# Patient Record
Sex: MALE | Employment: OTHER | URBAN - METROPOLITAN AREA
[De-identification: names, ages, dates, MRNs, and addresses within clinical notes are randomized per-mention and may not be internally consistent; named-entity substitution may affect disease eponyms.]

---

## 2019-09-23 ENCOUNTER — PATIENT OUTREACH (OUTPATIENT)
Dept: HEALTH INFORMATION MANAGEMENT | Facility: OTHER | Age: 74
End: 2019-09-23

## 2019-09-23 NOTE — PROGRESS NOTES
Outcome: Left Message    Please transfer to Patient Outreach Team at [phone] when patient returns call.    Attempt # 1

## 2019-10-01 NOTE — PROGRESS NOTES
Patient called in and declined at this time to set up his annual wellness. He stated he just saw his provider in August and will schedule to follow up with him when he sees a cardiology.

## 2019-12-17 ENCOUNTER — OFFICE VISIT (OUTPATIENT)
Dept: CARDIOLOGY | Facility: CLINIC | Age: 74
End: 2019-12-17
Payer: MEDICARE

## 2019-12-17 VITALS
HEIGHT: 71 IN | HEART RATE: 66 BPM | OXYGEN SATURATION: 96 % | SYSTOLIC BLOOD PRESSURE: 160 MMHG | DIASTOLIC BLOOD PRESSURE: 80 MMHG | BODY MASS INDEX: 30.1 KG/M2 | WEIGHT: 215 LBS

## 2019-12-17 DIAGNOSIS — I35.0 NONRHEUMATIC AORTIC VALVE STENOSIS: ICD-10-CM

## 2019-12-17 DIAGNOSIS — R53.83 OTHER FATIGUE: ICD-10-CM

## 2019-12-17 DIAGNOSIS — R01.1 UNDIAGNOSED CARDIAC MURMURS: ICD-10-CM

## 2019-12-17 DIAGNOSIS — I10 ESSENTIAL HYPERTENSION: ICD-10-CM

## 2019-12-17 DIAGNOSIS — E78.00 PURE HYPERCHOLESTEROLEMIA: ICD-10-CM

## 2019-12-17 LAB — EKG IMPRESSION: NORMAL

## 2019-12-17 PROCEDURE — 93000 ELECTROCARDIOGRAM COMPLETE: CPT | Performed by: INTERNAL MEDICINE

## 2019-12-17 PROCEDURE — 99406 BEHAV CHNG SMOKING 3-10 MIN: CPT | Performed by: INTERNAL MEDICINE

## 2019-12-17 PROCEDURE — 99204 OFFICE O/P NEW MOD 45 MIN: CPT | Mod: 25 | Performed by: INTERNAL MEDICINE

## 2019-12-17 NOTE — LETTER
[facility] [address],   2nd Floor  [place] [address]  Phone: [phone]  Fax: [phone]              Jacobo Bowling  1945    Encounter Date: 2019    Hayley Barker M.D.          PROGRESS NOTE:  Subjective:   Chief Complaint:   Chief Complaint   Patient presents with   • Fatigue   • Heart Murmur       Jacobo Bowling is a 74 y.o. male who is referred by KADI Castro for a murmur, Hx of rheumatic heart disease and fatigue.    At age 11, had rheumatic fever, joint pain, could not walk, then heart murmur same time, was in the hospital for weeks. Then murmur went away, was in the [employer] for many years. They did extensive testing, did not find anything wrong.    Noted on ecg to have sinus beatriz.    He is not limited by chest pain, pressure or tightness with activity.   No significant dyspnea on exertion, orthopnea or lower extremity swelling.   No significant palpitations, lightheaded, or presyncope/syncope.   Left eye operation, mild dizziness.  No symptoms of leg claudication.   No stroke/TIA like symptoms.  His balance feels off a bit after eye surgery.  Has noted from fatigue, slowing down.    No family history of premature coronary artery disease.  Father had CVA around age 67.  Mother  at 62 of breast CA.  No siblings.   Still smoking 8-10 cig per day, smoking since age 15.  Has mild HLP, .  No history of diabetes.  No history of hypertension but BP elevated today. Prior 140-160.  No history of autoimmune disease such as lupus or rheumatoid arthritis.  No chronic kidney disease.  Rare ETOH.    Put on 12 pounds, planning to lose it.  Likes salt, sun flower seeds with salt.    From [facility], mother Gu[place]ese, Father Polish, fought with Maltese resistance, he was conceived during the , moved to the [facility] in 4, age 8. Lived in [place], until the [employer] after high school. Retired from [place], [place], moved  "to [facility], then [place], was too hot, landed here, lived in [place].  Raised 2 daughters in [place] and [place].  One daughter in [place], one in [place]. Son-in-law Principal at [place] [employer].    Wife is Amy (goes by \"Kenneth,\" does not like Amy), not here today, in good health,  44 years. They met in [facility] [place], she is from [place].    DATA REVIEWED by me:  ECG 19  Sinus, 56, first degree AV delay.    Echo pending    Most recent labs:     8-15-19 H 14.9, p 141, na 140, k 4.5, cr 1.12, lfts normal, ldl 112, hdl 43, tg 145, tc 184, TSH 3.32    Past Medical History:   Diagnosis Date   • History of chickenpox    • History of measles    • History of mumps    • History of rheumatic fever      History reviewed. No pertinent surgical history.  Family History   Problem Relation Age of Onset   • Cancer Mother         Breast,  at 62   • Stroke Father         CVA age 67     Social History     Socioeconomic History   • Marital status:      Spouse name: Not on file   • Number of children: Not on file   • Years of education: Not on file   • Highest education level: Not on file   Occupational History   • Not on file   Social Needs   • Financial resource strain: Not on file   • Food insecurity:     Worry: Not on file     Inability: Not on file   • Transportation needs:     Medical: Not on file     Non-medical: Not on file   Tobacco Use   • Smoking status: Current Every Day Smoker   • Smokeless tobacco: Never Used   Substance and Sexual Activity   • Alcohol use: Yes   • Drug use: No   • Sexual activity: Not on file   Lifestyle   • Physical activity:     Days per week: Not on file     Minutes per session: Not on file   • Stress: Not on file   Relationships   • Social connections:     Talks on phone: Not on file     Gets together: Not on file     Attends Voodoo service: Not on file     Active member of club or organization: Not on file     Attends meetings of clubs or organizations: Not on file     " "Relationship status: Not on file   • Intimate partner violence:     Fear of current or ex partner: Not on file     Emotionally abused: Not on file     Physically abused: Not on file     Forced sexual activity: Not on file   Other Topics Concern   • Not on file   Social History Narrative   • Not on file     Allergies   Allergen Reactions   • Typhoid Vaccines        No current outpatient medications on file.     No current facility-administered medications for this visit.        ROS  All others systems reviewed and negative.     Objective:     /80 (BP Location: Right arm, Patient Position: Sitting)   Pulse 66   Ht 1.803 m (5' 11\")   Wt 97.5 kg (215 lb)   SpO2 96%  Body mass index is 29.99 kg/m².    Physical Exam   General: No acute distress. Well nourished.  HEENT: EOM grossly intact, no scleral icterus, no pharyngeal erythema.   Neck:  No JVD, no bruits, trachea midline  CVS: Slow, regular, 3/6 mid peaking sys murmur R, LLSB, No LE edema.  2+ radial pulses, 2+ PT pulses  Resp: CTAB. No wheezing or crackles/rhonchi. Normal respiratory effort.  Abdomen: Soft, NT, no juliette hepatomegaly.  MSK/Ext: No clubbing or cyanosis.  Skin: Warm and dry, no rashes.  Neurological: CN III-XII grossly intact. No focal deficits. Resting tremor.  Psych: A&O x 3, appropriate affect, good judgement      Assessment:     1. Other fatigue  EKG   2. Undiagnosed cardiac murmurs     3. Essential hypertension     4. Pure hypercholesterolemia     5. Nonrheumatic aortic valve stenosis  EC-ECHOCARDIOGRAM COMPLETE W/O CONT       Medical Decision Making:  Today's Assessment / Status / Plan:     -He has HTN, see below, I suspect he will be headed toward meds, he takes in a lot of salt.  -He has very mild HLP, no meds yet  -Smoking in bad, he has cut bad, would like to stop, reports he does not inhale.  -Tremor, sounds like mostly resting, consider BB but HR a little on the slow side.  -Needs echo, I suspect at least mild AS but not " severe    I spent 3 minutes discussing the need for smoking/tobacco cessation. We discussed measures for quitting including replacements such as nicotine gum/nicotine patch and I have encouraged further management with PCP.    Written instructions given today:    -Echocardiogram- heart pictures to look at the heart structure and pump function.    *Goal blood pressure is ideally under 120/80, at least under 130/80.    -Blood pressure cuff, spend in the $40-65, with good return policy  -It should be automatic, upper arm, measure your arm to get the correct size, probably adult Large  -Put the cuff in place, sit quietly for 5 min, legs uncrossed, with back support, the take your blood pressure, write it down, keep a log  -Check no more than 1 time day, maybe 4-5 times per week, try different times of day.  -Bring your cuff to at least one appointment where it can be calibrated to a manual cuff.    Salt=sodium=sea salt, guidelines say stay under 2,500 mg daily but I ask for under 4,000 mg daily.  Get salt smart, start looking at labels, count it up.  Salt is hidden in everything, salad dressing, cheese, most canned food.    We now know that lack of exercise is as risky as smoking or having diabetes in terms of your heart health.  Try to perform a moderate intensity exercise which includes risk walking at least 150 minutes a week or 30 minutes for 5 days in the week.  If you are able to perform more vigorous exercise, 70 minutes/week is sufficient.    SMOKING CESSATION:  -Talk to your primary care provider about nicotine supplements such as gum or patches.  -Talked your primary care provider about pharmacological assistance such as Wellbutrin or Chantix.    -[place] Tobacco Quitline  The [place] Tobacco Quitline is a free telephone and online coaching service for any [place] resident who is ready to quit tobacco. Information, referrals and coaching are confidential, and sessions are designed on a schedule that is convenient  for you.    www.[employer]  1-800-QUIT-NOW      Return in about 4 weeks (around 1/14/2020).    It is my pleasure to participate in the care of Mr. Bowling.  Please do not hesitate to contact me with questions or concerns.    Hayley Barker MD, [facility]  Cardiologist [facility]    Please note that this dictation was created using voice recognition software. I have made every reasonable attempt to correct obvious errors, but it is possible there are errors of grammar and possibly content that I did not discover before finalizing the note.      Han Cavazos, P.A.-C.  [address]  [place] [address]  VIA In Basket

## 2019-12-17 NOTE — PROGRESS NOTES
"Subjective:   Chief Complaint:   Chief Complaint   Patient presents with   • Fatigue   • Heart Murmur       Jacobo Bowling is a 74 y.o. male who is referred by KADI Castro for a murmur, Hx of rheumatic heart disease and fatigue.    At age 11, had rheumatic fever, joint pain, could not walk, then heart murmur same time, was in the hospital for weeks. Then murmur went away, was in the [employer] for many years. They did extensive testing, did not find anything wrong.    Noted on ecg to have sinus beatriz.    He is not limited by chest pain, pressure or tightness with activity.   No significant dyspnea on exertion, orthopnea or lower extremity swelling.   No significant palpitations, lightheaded, or presyncope/syncope.   Left eye operation, mild dizziness.  No symptoms of leg claudication.   No stroke/TIA like symptoms.  His balance feels off a bit after eye surgery.  Has noted from fatigue, slowing down.    No family history of premature coronary artery disease.  Father had CVA around age 67.  Mother  at 62 of breast CA.  No siblings.   Still smoking 8-10 cig per day, smoking since age 15.  Has mild HLP, .  No history of diabetes.  No history of hypertension but BP elevated today. Prior 140-160.  No history of autoimmune disease such as lupus or rheumatoid arthritis.  No chronic kidney disease.  Rare ETOH.    Put on 12 pounds, planning to lose it.  Likes salt, sun flower seeds with salt.    From [facility], mother Gabonese, Father Polish, fought with Bengali resistance, he was conceived during the , moved to the [facility] in 4, age 8. Lived in [place], until the [place] after high school. Retired from [place], [place], moved to [facility], then [place], was too hot, landed here, lived in [place].  Raised 2 daughters in [place] and [place].  One daughter in [place], one in [place]. Son-in-law Principal at [employer].    Wife is Amy (goes by \"Kenneth,\" does not like Amy), not here today, in good health,  " 44 years. They met in [facility], she is from [place].    DATA REVIEWED by me:  ECG 19  Sinus, 56, first degree AV delay.    Echo pending    Most recent labs:     8-15-19 H 14.9, p 141, na 140, k 4.5, cr 1.12, lfts normal, ldl 112, hdl 43, tg 145, tc 184, TSH 3.32    Past Medical History:   Diagnosis Date   • History of chickenpox    • History of measles    • History of mumps    • History of rheumatic fever      History reviewed. No pertinent surgical history.  Family History   Problem Relation Age of Onset   • Cancer Mother         Breast,  at 62   • Stroke Father         CVA age 67     Social History     Socioeconomic History   • Marital status:      Spouse name: Not on file   • Number of children: Not on file   • Years of education: Not on file   • Highest education level: Not on file   Occupational History   • Not on file   Social Needs   • Financial resource strain: Not on file   • Food insecurity:     Worry: Not on file     Inability: Not on file   • Transportation needs:     Medical: Not on file     Non-medical: Not on file   Tobacco Use   • Smoking status: Current Every Day Smoker   • Smokeless tobacco: Never Used   Substance and Sexual Activity   • Alcohol use: Yes   • Drug use: No   • Sexual activity: Not on file   Lifestyle   • Physical activity:     Days per week: Not on file     Minutes per session: Not on file   • Stress: Not on file   Relationships   • Social connections:     Talks on phone: Not on file     Gets together: Not on file     Attends Yarsani service: Not on file     Active member of club or organization: Not on file     Attends meetings of clubs or organizations: Not on file     Relationship status: Not on file   • Intimate partner violence:     Fear of current or ex partner: Not on file     Emotionally abused: Not on file     Physically abused: Not on file     Forced sexual activity: Not on file   Other Topics Concern   • Not on file   Social History Narrative   • Not on  "file     Allergies   Allergen Reactions   • Typhoid Vaccines        No current outpatient medications on file.     No current facility-administered medications for this visit.        ROS  All others systems reviewed and negative.     Objective:     /80 (BP Location: Right arm, Patient Position: Sitting)   Pulse 66   Ht 1.803 m (5' 11\")   Wt 97.5 kg (215 lb)   SpO2 96%  Body mass index is 29.99 kg/m².    Physical Exam   General: No acute distress. Well nourished.  HEENT: EOM grossly intact, no scleral icterus, no pharyngeal erythema.   Neck:  No JVD, no bruits, trachea midline  CVS: Slow, regular, 3/6 mid peaking sys murmur R, LLSB, No LE edema.  2+ radial pulses, 2+ PT pulses  Resp: CTAB. No wheezing or crackles/rhonchi. Normal respiratory effort.  Abdomen: Soft, NT, no juliette hepatomegaly.  MSK/Ext: No clubbing or cyanosis.  Skin: Warm and dry, no rashes.  Neurological: CN III-XII grossly intact. No focal deficits. Resting tremor.  Psych: A&O x 3, appropriate affect, good judgement      Assessment:     1. Other fatigue  EKG   2. Undiagnosed cardiac murmurs     3. Essential hypertension     4. Pure hypercholesterolemia     5. Nonrheumatic aortic valve stenosis  EC-ECHOCARDIOGRAM COMPLETE W/O CONT       Medical Decision Making:  Today's Assessment / Status / Plan:     -He has HTN, see below, I suspect he will be headed toward meds, he takes in a lot of salt.  -He has very mild HLP, no meds yet  -Smoking in bad, he has cut bad, would like to stop, reports he does not inhale.  -Tremor, sounds like mostly resting, consider BB but HR a little on the slow side.  -Needs echo, I suspect at least mild AS but not severe    I spent 3 minutes discussing the need for smoking/tobacco cessation. We discussed measures for quitting including replacements such as nicotine gum/nicotine patch and I have encouraged further management with PCP.    Written instructions given today:    -Echocardiogram- heart pictures to look at " the heart structure and pump function.    *Goal blood pressure is ideally under 120/80, at least under 130/80.    -Blood pressure cuff, spend in the $40-65, with good return policy  -It should be automatic, upper arm, measure your arm to get the correct size, probably adult Large  -Put the cuff in place, sit quietly for 5 min, legs uncrossed, with back support, the take your blood pressure, write it down, keep a log  -Check no more than 1 time day, maybe 4-5 times per week, try different times of day.  -Bring your cuff to at least one appointment where it can be calibrated to a manual cuff.    Salt=sodium=sea salt, guidelines say stay under 2,500 mg daily but I ask for under 4,000 mg daily.  Get salt smart, start looking at labels, count it up.  Salt is hidden in everything, salad dressing, cheese, most canned food.    We now know that lack of exercise is as risky as smoking or having diabetes in terms of your heart health.  Try to perform a moderate intensity exercise which includes risk walking at least 150 minutes a week or 30 minutes for 5 days in the week.  If you are able to perform more vigorous exercise, 70 minutes/week is sufficient.    SMOKING CESSATION:  -Talk to your primary care provider about nicotine supplements such as gum or patches.  -Talked your primary care provider about pharmacological assistance such as Wellbutrin or Chantix.    -[place] Tobacco Quitline  The [place] Tobacco Quitline is a free telephone and online coaching service for any [place] resident who is ready to quit tobacco. Information, referrals and coaching are confidential, and sessions are designed on a schedule that is convenient for you.    [email or web address]  1-800-QUIT-NOW      Return in about 4 weeks (around 1/14/2020).    It is my pleasure to participate in the care of Mr. Bowling.  Please do not hesitate to contact me with questions or concerns.    Hayley Barker MD, [facility]  Cardiologist [facility]  Heart and Vascular Health    Please note that this dictation was created using voice recognition software. I have made every reasonable attempt to correct obvious errors, but it is possible there are errors of grammar and possibly content that I did not discover before finalizing the note.

## 2019-12-17 NOTE — PATIENT INSTRUCTIONS
-Echocardiogram- heart pictures to look at the heart structure and pump function.    *Goal blood pressure is ideally under 120/80, at least under 130/80.    -Blood pressure cuff, spend in the $40-65, with good return policy  -It should be automatic, upper arm, measure your arm to get the correct size, probably adult Large  -Put the cuff in place, sit quietly for 5 min, legs uncrossed, with back support, the take your blood pressure, write it down, keep a log  -Check no more than 1 time day, maybe 4-5 times per week, try different times of day.  -Bring your cuff to at least one appointment where it can be calibrated to a manual cuff.    Salt=sodium=sea salt, guidelines say stay under 2,500 mg daily but I ask for under 4,000 mg daily.  Get salt smart, start looking at labels, count it up.  Salt is hidden in everything, salad dressing, cheese, most canned food.    We now know that lack of exercise is as risky as smoking or having diabetes in terms of your heart health.  Try to perform a moderate intensity exercise which includes risk walking at least 150 minutes a week or 30 minutes for 5 days in the week.  If you are able to perform more vigorous exercise, 70 minutes/week is sufficient.    SMOKING CESSATION:  -Talk to your primary care provider about nicotine supplements such as gum or patches.  -Talked your primary care provider about pharmacological assistance such as Wellbutrin or Chantix.    -[place] Tobacco Quitline  The [place] Tobacco Quitline is a free telephone and online coaching service for any [place] resident who is ready to quit tobacco. Information, referrals and coaching are confidential, and sessions are designed on a schedule that is convenient for you.    [email or web address].[email or web address]  1-800-QUIT-NOW

## 2019-12-18 ENCOUNTER — ANCILLARY PROCEDURE (OUTPATIENT)
Dept: CARDIOLOGY | Facility: IMAGING CENTER | Age: 74
End: 2019-12-18
Attending: INTERNAL MEDICINE
Payer: MEDICARE

## 2019-12-18 DIAGNOSIS — I35.0 NONRHEUMATIC AORTIC VALVE STENOSIS: ICD-10-CM

## 2019-12-18 LAB
LV EJECT FRACT  99904: 65
LV EJECT FRACT MOD 2C 99903: 51
LV EJECT FRACT MOD 4C 99902: 80.22
LV EJECT FRACT MOD BP 99901: 67.77

## 2019-12-18 PROCEDURE — 93306 TTE W/DOPPLER COMPLETE: CPT | Performed by: INTERNAL MEDICINE

## 2019-12-20 ENCOUNTER — TELEPHONE (OUTPATIENT)
Dept: CARDIOLOGY | Facility: MEDICAL CENTER | Age: 74
End: 2019-12-20

## 2019-12-20 NOTE — TELEPHONE ENCOUNTER
----- Message from Hayley Barker M.D. sent at 12/18/2019  4:47 PM PST -----  K,  This is a very nice shanell that I met in [place].  He had rheumatic fever as a child and it is probably causing mild to moderate aortic stenosis.  Please let him know.  We will repeat an echo in 1 year.  Also, when I met him I told him he had high blood pressure.  His echo shows that his heart muscle is mildly thickened (mild LVH or left ventricular hypertrophy) from years of uncontrolled high blood pressure so we really need to work to get his blood pressure under 130/80.  If he has any questions, just defer them to myself or Jagruti.  Thank you!

## 2020-01-17 ENCOUNTER — OFFICE VISIT (OUTPATIENT)
Dept: CARDIOLOGY | Facility: CLINIC | Age: 75
End: 2020-01-17
Payer: MEDICARE

## 2020-01-17 VITALS
OXYGEN SATURATION: 94 % | SYSTOLIC BLOOD PRESSURE: 138 MMHG | HEART RATE: 58 BPM | HEIGHT: 71 IN | BODY MASS INDEX: 30.1 KG/M2 | DIASTOLIC BLOOD PRESSURE: 70 MMHG | WEIGHT: 215 LBS

## 2020-01-17 DIAGNOSIS — I51.7 LVH (LEFT VENTRICULAR HYPERTROPHY): ICD-10-CM

## 2020-01-17 DIAGNOSIS — R53.83 OTHER FATIGUE: ICD-10-CM

## 2020-01-17 DIAGNOSIS — E78.00 PURE HYPERCHOLESTEROLEMIA: ICD-10-CM

## 2020-01-17 DIAGNOSIS — Z72.0 TOBACCO USE: ICD-10-CM

## 2020-01-17 DIAGNOSIS — I10 ESSENTIAL HYPERTENSION: ICD-10-CM

## 2020-01-17 DIAGNOSIS — I06.0 RHEUMATIC AORTIC STENOSIS: ICD-10-CM

## 2020-01-17 PROCEDURE — 99214 OFFICE O/P EST MOD 30 MIN: CPT | Performed by: INTERNAL MEDICINE

## 2020-01-17 RX ORDER — LISINOPRIL 2.5 MG/1
2.5 TABLET ORAL DAILY
Qty: 90 TAB | Refills: 3 | Status: SHIPPED | OUTPATIENT
Start: 2020-01-17 | End: 2021-01-11 | Stop reason: SDUPTHER

## 2020-01-17 NOTE — LETTER
[facility] [address],   2nd Floor  [place] [address]  Phone: [phone]  Fax: [phone]              Jacobo Bowling  1945    Encounter Date: 2020    Hayley Barker M.D.          PROGRESS NOTE:  Subjective:   Chief Complaint:   Chief Complaint   Patient presents with   • Fatigue       Jacobo Bowling is a 74 y.o. male who returns for mild to mod AS, prior rheumatic heart disease and fatigue, HTN with LVH.    At age 11, had rheumatic fever, joint pain, could not walk, then heart murmur same time, was in the hospital for weeks. Then murmur went away, was in the [employer] for many years. They did extensive testing, did not find anything wrong.  Our echo 12-19 with mild to mod AS.    Has HTN with mild LVH, no meds yet.  Still smoking, was 8-10 cig per day, smoking since age 15, down to 1-2, just a few puffs.  Has mild HLP,     Noted on ecg to have sinus beatriz.    He is not limited by chest pain, pressure or tightness with activity.   No significant dyspnea on exertion, orthopnea or lower extremity swelling.   No significant palpitations, lightheaded, or presyncope/syncope.   Left eye operation, mild dizziness.  No symptoms of leg claudication.   No stroke/TIA like symptoms.  His balance feels off a bit after eye surgery.  Has noted from fatigue, slowing down.    No family history of premature coronary artery disease.  Father had CVA around age 67.  Mother  at 62 of breast CA.  No siblings.   No history of diabetes.  No history of autoimmune disease such as lupus or rheumatoid arthritis.  No chronic kidney disease.  Rare ETOH.    Put on 12 pounds, planning to lose it.  Likes salt, sun flower seeds with salt.    From [place], mother [place], Father Polish, fought with Chinese resistance, he was conceived during the , moved to the [facility] in , age 8. Lived in [place], until the [employer] after high school. Retired  "from [place], [place], moved to [facility], then [place], was too hot, landed here, lived in [place].  Raised 2 daughters in [place] and [place].  One daughter in [place], one in [place]. Son-in-law Principal at [employer].    Wife is Amy (goes by \"Kenneth,\" does not like Amy), not here today, in good health,  44 years. They met in [facility], she is from [place].    DATA REVIEWED by me:  ECG 19  Sinus, 56, first degree AV delay.    Echo 19  No prior study is available for comparison.   Left ventricular ejection fraction is visually estimated to be 65%.  Mild concentric left ventricular hypertrophy.  Mildly dilated left atrium.  Calcified aortic valve leaflets, rheumatic appearing.  Mild to moderate aortic stenosis, visually appears moderate: Vmax is    2.9 m/s, MG 20 mmHg, BRE 1.4 cm2.  Trace tricuspid regurgitation.  Estimated right ventricular systolic pressure  is 30 mmHg.     Most recent labs:     8-15-19 H 14.9, p 141, na 140, k 4.5, cr 1.12, lfts normal, ldl 112, hdl 43, tg 145, tc 184, TSH 3.32    Past Medical History:   Diagnosis Date   • History of chickenpox    • History of measles    • History of mumps    • History of rheumatic fever      History reviewed. No pertinent surgical history.  Family History   Problem Relation Age of Onset   • Cancer Mother         Breast,  at 62   • Stroke Father         CVA age 67     Social History     Socioeconomic History   • Marital status:      Spouse name: Not on file   • Number of children: Not on file   • Years of education: Not on file   • Highest education level: Not on file   Occupational History   • Not on file   Social Needs   • Financial resource strain: Not on file   • Food insecurity:     Worry: Not on file     Inability: Not on file   • Transportation needs:     Medical: Not on file     Non-medical: Not on file   Tobacco Use   • Smoking status: Current Every Day Smoker   • Smokeless tobacco: Never Used   Substance and Sexual Activity  " "  • Alcohol use: Yes   • Drug use: No   • Sexual activity: Not on file   Lifestyle   • Physical activity:     Days per week: Not on file     Minutes per session: Not on file   • Stress: Not on file   Relationships   • Social connections:     Talks on phone: Not on file     Gets together: Not on file     Attends Judaism service: Not on file     Active member of club or organization: Not on file     Attends meetings of clubs or organizations: Not on file     Relationship status: Not on file   • Intimate partner violence:     Fear of current or ex partner: Not on file     Emotionally abused: Not on file     Physically abused: Not on file     Forced sexual activity: Not on file   Other Topics Concern   • Not on file   Social History Narrative   • Not on file     Allergies   Allergen Reactions   • Typhoid Vaccines        Current Outpatient Medications   Medication Sig Dispense Refill   • aspirin EC (ECOTRIN) 81 MG Tablet Delayed Response Take 1 Tab by mouth every day. 30 Tab    • lisinopril (PRINIVIL) 2.5 MG Tab Take 1 Tab by mouth every day. 90 Tab 3     No current facility-administered medications for this visit.        ROS    All others systems reviewed and negative.     Objective:     /70 (BP Location: Right arm, Patient Position: Sitting)   Pulse (!) 58   Ht 1.803 m (5' 11\")   Wt 97.5 kg (215 lb)   SpO2 94%  Body mass index is 29.99 kg/m².    Physical Exam   General: No acute distress. Well nourished.  HEENT: EOM grossly intact, no scleral icterus, no pharyngeal erythema.   Neck:  No JVD, no bruits, trachea midline  CVS: Slow, regular, 3/6 mid peaking sys murmur R, LLSB, No LE edema.  2+ radial pulses, 2+ PT pulses  Resp: CTAB. No wheezing or crackles/rhonchi. Normal respiratory effort.  Abdomen: Soft, NT, no juliette hepatomegaly.  MSK/Ext: No clubbing or cyanosis.  Skin: Warm and dry, no rashes.  Neurological: CN III-XII grossly intact. No focal deficits. Resting tremor.  Psych: A&O x 3, appropriate " affect, good judgement    Physical exam performed today and unchanged, except what is noted, compared to 12-17-19.      Assessment:     1. Rheumatic aortic stenosis     2. Other fatigue     3. Essential hypertension  Basic Metabolic Panel   4. Pure hypercholesterolemia     5. Tobacco use     6. LVH (left ventricular hypertrophy)         Medical Decision Making:  Today's Assessment / Status / Plan:     -He has HTN, see below, I suspect he will be headed toward meds, he takes in a lot of salt. Goal BP closer to 120/80 with LVH.  -He has very mild HLP, no meds yet  -Smoking- he had cut back, wondering if he can stop psychologically completely.  -Tremor, sounds like mostly resting, consider BB but HR a little on the slow side.  -Repeat echo 1-2 years.  -ASA 81 mg to protect from thrombus on valve  -I recommend low dose ACE-I, no BB again because of slow HR  -RTC 6 months  -BMP 1 month, phone call results  -He wants to lose weight    I spent 4 minutes discussing the need for smoking/tobacco cessation. We discussed measures for quitting including lifestyle changes.    Written instructions given today:    -Low dose lisinopril 2.5 mg, watch for dry, hacking cough that can develop at any time, watch for lightheadness, call the office if you are having side effects  -Check non fasting blood work in 1 month to look at kidney function  -ASA 81 mg daily  -We will repeat an echo in 1-2 years  -[phone]    Goal blood pressure is ideally under 120/80, at least under 130/80.  Salt=sodium=sea salt, guidelines say stay under 2,500 mg daily but I ask for under 4,000 mg daily.  Get salt smart, start looking at labels, count it up.  Salt is hidden in everything, salad dressing, cheese, most canned food.    We now know that lack of exercise is as risky as smoking or having diabetes in terms of your heart health.  Try to perform a moderate intensity exercise which includes risk walking at least 150 minutes a week or 30 minutes for 5  days in the week.  If you are able to perform more vigorous exercise, 70 minutes/week is sufficient.    Please look into the following diets and incorporate them into your diet    [facility]    Dr Nahomi Harley's Cardiologist    DASH DIET - American Heart Association    Return in about 6 months (around 7/17/2020).    It is my pleasure to participate in the care of Mr. Bowling.  Please do not hesitate to contact me with questions or concerns.    Hayley Barker MD, [facility]  Cardiologist [facility]    Please note that this dictation was created using voice recognition software. I have made every reasonable attempt to correct obvious errors, but it is possible there are errors of grammar and possibly content that I did not discover before finalizing the note.      Han Cavazos, P.A.-C.  [address]  [place] [address]  VIA In Basket

## 2020-01-17 NOTE — PATIENT INSTRUCTIONS
-Low dose lisinopril 2.5 mg, watch for dry, hacking cough that can develop at any time, watch for lightheadness, call the office if you are having side effects  -Check non fasting blood work in 1 month to look at kidney function  -ASA 81 mg daily  -We will repeat an echo in 1-2 years  -[phone]    Goal blood pressure is ideally under 120/80, at least under 130/80.  Salt=sodium=sea salt, guidelines say stay under 2,500 mg daily but I ask for under 4,000 mg daily.  Get salt smart, start looking at labels, count it up.  Salt is hidden in everything, salad dressing, cheese, most canned food.    We now know that lack of exercise is as risky as smoking or having diabetes in terms of your heart health.  Try to perform a moderate intensity exercise which includes risk walking at least 150 minutes a week or 30 minutes for 5 days in the week.  If you are able to perform more vigorous exercise, 70 minutes/week is sufficient.    Please look into the following diets and incorporate them into your diet    [facility]    Dr Nahomi Harley's Cardiologist    DASH DIET - American Heart Association

## 2020-01-17 NOTE — PROGRESS NOTES
"Subjective:   Chief Complaint:   Chief Complaint   Patient presents with   • Fatigue       Jacobo Bowling is a 74 y.o. male who returns for mild to mod AS, prior rheumatic heart disease and fatigue, HTN with LVH.    At age 11, had rheumatic fever, joint pain, could not walk, then heart murmur same time, was in the hospital for weeks. Then murmur went away, was in the [employer] for many years. They did extensive testing, did not find anything wrong.  Our echo 12-19 with mild to mod AS.    Has HTN with mild LVH, no meds yet.  Still smoking, was 8-10 cig per day, smoking since age 15, down to 1-2, just a few puffs.  Has mild HLP,     Noted on ecg to have sinus beatriz.    He is not limited by chest pain, pressure or tightness with activity.   No significant dyspnea on exertion, orthopnea or lower extremity swelling.   No significant palpitations, lightheaded, or presyncope/syncope.   Left eye operation, mild dizziness.  No symptoms of leg claudication.   No stroke/TIA like symptoms.  His balance feels off a bit after eye surgery.  Has noted from fatigue, slowing down.    No family history of premature coronary artery disease.  Father had CVA around age 67.  Mother  at 62 of breast CA.  No siblings.   No history of diabetes.  No history of autoimmune disease such as lupus or rheumatoid arthritis.  No chronic kidney disease.  Rare ETOH.    Put on 12 pounds, planning to lose it.  Likes salt, sun flower seeds with salt.    From [place], mother [place], Father [place], fought with [place] resistance, he was conceived during the , moved to the \[facility]"" in , age 8. Lived in [place], until the [employer] after high school. Retired from [place], [place], moved to [facility], then [place], was too hot, landed here, lived in [place].  Raised 2 daughters in [place] and [place].  One daughter in [place], one in [place]. Son-in-law Principal at [place] [employer].    Wife is Amy (goes by \"Kenneth,\" does not like Amy), not here today, " in good health,  44 years. They met in [facility], she is from [place].    DATA REVIEWED by me:  ECG 19  Sinus, 56, first degree AV delay.    Echo 19  No prior study is available for comparison.   Left ventricular ejection fraction is visually estimated to be 65%.  Mild concentric left ventricular hypertrophy.  Mildly dilated left atrium.  Calcified aortic valve leaflets, rheumatic appearing.  Mild to moderate aortic stenosis, visually appears moderate: Vmax is    2.9 m/s, MG 20 mmHg, BRE 1.4 cm2.  Trace tricuspid regurgitation.  Estimated right ventricular systolic pressure  is 30 mmHg.     Most recent labs:     8-15-19 H 14.9, p 141, na 140, k 4.5, cr 1.12, lfts normal, ldl 112, hdl 43, tg 145, tc 184, TSH 3.32    Past Medical History:   Diagnosis Date   • History of chickenpox    • History of measles    • History of mumps    • History of rheumatic fever      History reviewed. No pertinent surgical history.  Family History   Problem Relation Age of Onset   • Cancer Mother         Breast,  at 62   • Stroke Father         CVA age 67     Social History     Socioeconomic History   • Marital status:      Spouse name: Not on file   • Number of children: Not on file   • Years of education: Not on file   • Highest education level: Not on file   Occupational History   • Not on file   Social Needs   • Financial resource strain: Not on file   • Food insecurity:     Worry: Not on file     Inability: Not on file   • Transportation needs:     Medical: Not on file     Non-medical: Not on file   Tobacco Use   • Smoking status: Current Every Day Smoker   • Smokeless tobacco: Never Used   Substance and Sexual Activity   • Alcohol use: Yes   • Drug use: No   • Sexual activity: Not on file   Lifestyle   • Physical activity:     Days per week: Not on file     Minutes per session: Not on file   • Stress: Not on file   Relationships   • Social connections:     Talks on phone: Not on file     Gets together: Not  "on file     Attends Buddhism service: Not on file     Active member of club or organization: Not on file     Attends meetings of clubs or organizations: Not on file     Relationship status: Not on file   • Intimate partner violence:     Fear of current or ex partner: Not on file     Emotionally abused: Not on file     Physically abused: Not on file     Forced sexual activity: Not on file   Other Topics Concern   • Not on file   Social History Narrative   • Not on file     Allergies   Allergen Reactions   • Typhoid Vaccines        Current Outpatient Medications   Medication Sig Dispense Refill   • aspirin EC (ECOTRIN) 81 MG Tablet Delayed Response Take 1 Tab by mouth every day. 30 Tab    • lisinopril (PRINIVIL) 2.5 MG Tab Take 1 Tab by mouth every day. 90 Tab 3     No current facility-administered medications for this visit.        ROS    All others systems reviewed and negative.     Objective:     /70 (BP Location: Right arm, Patient Position: Sitting)   Pulse (!) 58   Ht 1.803 m (5' 11\")   Wt 97.5 kg (215 lb)   SpO2 94%  Body mass index is 29.99 kg/m².    Physical Exam   General: No acute distress. Well nourished.  HEENT: EOM grossly intact, no scleral icterus, no pharyngeal erythema.   Neck:  No JVD, no bruits, trachea midline  CVS: Slow, regular, 3/6 mid peaking sys murmur R, LLSB, No LE edema.  2+ radial pulses, 2+ PT pulses  Resp: CTAB. No wheezing or crackles/rhonchi. Normal respiratory effort.  Abdomen: Soft, NT, no juliette hepatomegaly.  MSK/Ext: No clubbing or cyanosis.  Skin: Warm and dry, no rashes.  Neurological: CN III-XII grossly intact. No focal deficits. Resting tremor.  Psych: A&O x 3, appropriate affect, good judgement    Physical exam performed today and unchanged, except what is noted, compared to 12-17-19.      Assessment:     1. Rheumatic aortic stenosis     2. Other fatigue     3. Essential hypertension  Basic Metabolic Panel   4. Pure hypercholesterolemia     5. Tobacco use     6. " LVH (left ventricular hypertrophy)         Medical Decision Making:  Today's Assessment / Status / Plan:     -He has HTN, see below, I suspect he will be headed toward meds, he takes in a lot of salt. Goal BP closer to 120/80 with LVH.  -He has very mild HLP, no meds yet  -Smoking- he had cut back, wondering if he can stop psychologically completely.  -Tremor, sounds like mostly resting, consider BB but HR a little on the slow side.  -Repeat echo 1-2 years.  -ASA 81 mg to protect from thrombus on valve  -I recommend low dose ACE-I, no BB again because of slow HR  -RTC 6 months  -BMP 1 month, phone call results  -He wants to lose weight    I spent 4 minutes discussing the need for smoking/tobacco cessation. We discussed measures for quitting including lifestyle changes.    Written instructions given today:    -Low dose lisinopril 2.5 mg, watch for dry, hacking cough that can develop at any time, watch for lightheadness, call the office if you are having side effects  -Check non fasting blood work in 1 month to look at kidney function  -ASA 81 mg daily  -We will repeat an echo in 1-2 years  -[phone]    Goal blood pressure is ideally under 120/80, at least under 130/80.  Salt=sodium=sea salt, guidelines say stay under 2,500 mg daily but I ask for under 4,000 mg daily.  Get salt smart, start looking at labels, count it up.  Salt is hidden in everything, salad dressing, cheese, most canned food.    We now know that lack of exercise is as risky as smoking or having diabetes in terms of your heart health.  Try to perform a moderate intensity exercise which includes risk walking at least 150 minutes a week or 30 minutes for 5 days in the week.  If you are able to perform more vigorous exercise, 70 minutes/week is sufficient.    Please look into the following diets and incorporate them into your diet    [facility]    Dr Nahomi Harley's Cardiologist    DASH DIET - American Heart  Association    Return in about 6 months (around 7/17/2020).    It is my pleasure to participate in the care of Mr. Bowling.  Please do not hesitate to contact me with questions or concerns.    Hayley Barker MD, [facility]  Cardiologist [facility]    Please note that this dictation was created using voice recognition software. I have made every reasonable attempt to correct obvious errors, but it is possible there are errors of grammar and possibly content that I did not discover before finalizing the note.

## 2020-01-25 LAB
BUN SERPL-MCNC: 15 MG/DL (ref 8–27)
BUN/CREAT SERPL: 13 (ref 10–24)
CALCIUM SERPL-MCNC: 9.4 MG/DL (ref 8.6–10.2)
CHLORIDE SERPL-SCNC: 104 MMOL/L (ref 96–106)
CO2 SERPL-SCNC: 23 MMOL/L (ref 20–29)
CREAT SERPL-MCNC: 1.13 MG/DL (ref 0.76–1.27)
GLUCOSE SERPL-MCNC: 98 MG/DL (ref 65–99)
POTASSIUM SERPL-SCNC: 5 MMOL/L (ref 3.5–5.2)
SODIUM SERPL-SCNC: 140 MMOL/L (ref 134–144)

## 2020-06-15 ENCOUNTER — TELEPHONE (OUTPATIENT)
Dept: CARDIOLOGY | Facility: MEDICAL CENTER | Age: 75
End: 2020-06-15

## 2020-06-15 NOTE — TELEPHONE ENCOUNTER
YAO/sidney    Pt calling for clearance to have shoulder replacement to be done by Dr Arsenio Hernandez in [place].    Pt wants to know if this past January appt will be within the range of time when LS would be able to clear him.    Please call Jacobo

## 2020-06-15 NOTE — TELEPHONE ENCOUNTER
Hayley Barker M.D. 35 minutes ago (2:58 PM)          Letter done, dawn fax to ortho.  tx         Documentation      ---------------------------------------------------------------------------------------------------------------------------------------------    Clearance letter drawn up by RODRIGUEZ faxed to [phone], Dr. Hernandez's office,[facility], in [place]. Called pt to inform him.

## 2020-06-18 NOTE — TELEPHONE ENCOUNTER
Returned Bria's call. Bria says she has had her questions answered through talking w/ the pt. Bria states pt's surgery had previously been cancelled d/t a second degree HB on EKG prior to surgery. Pt asked for clearance from us after this. Bria understands that the pt has not been seen in the office since this occurrence and that pt does not have appt w/ LS until 7/16. Bria says their anesthesiologist will get another EKG prior to pt's new scheduled date for surgery and depending upon that they will either decide to proceed or have pt wait until he's seen in our office.

## 2020-06-18 NOTE — TELEPHONE ENCOUNTER
Janee Saba RN from [facility] calling to report she received the letter but no notes or ekg with it.  Also, Bria states she is unable to find any notes or ekg in epic.    Please call Bria

## 2020-06-19 NOTE — TELEPHONE ENCOUNTER
Called [facility] again, they will fax over first EKG and repeat EKG from this morning. Fax number provided. They report it's possibly a first degree HB, instead of second degree. They plan to proceed w/ pt's surgery on 6/29, but welcome LS's recommendations.

## 2020-06-19 NOTE — TELEPHONE ENCOUNTER
Pls get copy of ecg with second degree HB OR updated ECG and if it is normal she can have her surgery. Let me know which.  Tx,  LS

## 2020-06-20 NOTE — TELEPHONE ENCOUNTER
Janee,  The ECG shows second-degree heart block type I, Wenckebach which is typically benign.  We will await the next ECG.  Should not be a reason to withhold surgery.  Prior letter should still stand.

## 2020-07-13 NOTE — PROGRESS NOTES
Subjective:   Chief Complaint:   Chief Complaint   Patient presents with   • HTN (Controlled)       Jacobo Bowling is a 74 y.o. male who returns for mild to mod AS, prior rheumatic heart disease and fatigue, HTN with LVH.    At age 11, had rheumatic fever, joint pain, could not walk, then heart murmur same time, was in the hospital for weeks. Then murmur went away, was in the [employer] for many years. They did extensive testing, did not find anything wrong.  Our echo 12-19 with mild to mod AS.    Has HTN with mild LVH, no meds yet.  Still smoking, was 8-10 cig per day, smoking since age 15, down to 1-2, just a few puffs.  Has mild HLP,     Noted on ecg to have sinus beatriz.  I was called about an ecg with second degree heart beat post surgery, I actually suspect high grade heart block.  Today, probably in afib though cannot exclude SSS with high grade heart block    He is not limited by chest pain, pressure or tightness with activity.   No significant dyspnea on exertion, orthopnea or lower extremity swelling.    Left eye operation, some balance problems.  No symptoms of leg claudication.   No stroke/TIA like symptoms.  His balance feels off a bit after eye surgery.  Has noted from fatigue, slowing down.    He is having some fatigue.  No presyncope/syncope.     No family history of premature coronary artery disease.  Father had CVA around age 67.  Mother  at 62 of breast CA.  No siblings.   No history of diabetes.  No history of autoimmune disease such as lupus or rheumatoid arthritis.  No chronic kidney disease.  Rare ETOH.    Put on 12 pounds, planning to lose it.  Likes salt, sun flower seeds with salt.    From [facility], mother [place]ese, Father Polish, fought with [place] resistance, he was conceived during the , moved to the [facility] in , age 8. Lived in [place], until the [place] after high school. Retired from [place], [place], moved to [facility], then [place], was too hot, landed here, lived in  "[place].  Raised 2 daughters in [place] and [place].  One daughter in [place], one in [place]. Son-in-law Principal at [employer].    Wife is Amy (goes by \"Kenneth,\" does not like Amy), not here today, in good health,  44 years. They met in [facility], she is from [place].    DATA REVIEWED by me:  ECG [id number]  Probably afib, 40 though cannot exclude SSS with high grade heart block      ECG 6-  Sinus bradycardia, wenckebach, 41, heart block    ECG 12-17-19  Sinus, 56, first degree AV delay.    Echo 12-18-19  No prior study is available for comparison.   Left ventricular ejection fraction is visually estimated to be 65%.  Mild concentric left ventricular hypertrophy.  Mildly dilated left atrium.  Calcified aortic valve leaflets, rheumatic appearing.  Mild to moderate aortic stenosis, visually appears moderate: Vmax is    2.9 m/s, MG 20 mmHg, BRE 1.4 cm2.  Trace tricuspid regurgitation.  Estimated right ventricular systolic pressure  is 30 mmHg.     Most recent labs:     Lab Results   Component Value Date/Time    HEMOGLOBIN 14.9 08/15/2019 04:18 AM    HEMATOCRIT 43.3 08/15/2019 04:18 AM     (H) 08/15/2019 04:18 AM    TSH 3.320 08/15/2019 04:18 AM      Lab Results   Component Value Date/Time    SODIUM 138 06/09/2020 09:20 AM    POTASSIUM 4.8 06/09/2020 09:20 AM    CHLORIDE 105 06/09/2020 09:20 AM    CO2 27 06/09/2020 09:20 AM    GLUCOSE 91 06/09/2020 09:20 AM    BUN 23 (H) 06/09/2020 09:20 AM    CREATININE 1.2 06/09/2020 09:20 AM      Lab Results   Component Value Date/Time    ASTSGOT 20 08/15/2019 04:18 AM    ALTSGPT 18 08/15/2019 04:18 AM    ALBUMIN 4.5 08/15/2019 04:18 AM      Lab Results   Component Value Date/Time    CHOLSTRLTOT 184 08/15/2019 04:18 AM     (H) 08/15/2019 04:18 AM    HDL 43 08/15/2019 04:18 AM    TRIGLYCERIDE 145 08/15/2019 04:18 AM       8-15-19 H 14.9, p 141, na 140, k 4.5, cr 1.12, lfts normal, ldl 112, hdl 43, tg 145, tc 184, TSH 3.32    Past Medical History:   Diagnosis " Date   • History of chickenpox    • History of measles    • History of mumps    • History of rheumatic fever    • Hypertension      Past Surgical History:   Procedure Laterality Date   • PB RECONSTR TOTAL SHOULDER IMPLANT Right 2020    Procedure: RT ARTHROPLASTY, SHOULDER, TOTAL;  Surgeon: Arsenio Hernandez M.D.;  Location: SURGERY [facility];  Service: Orthopedics   • OTHER      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   • OTHER ORTHOPEDIC SURGERY      bilat RCR     Family History   Problem Relation Age of Onset   • Cancer Mother         Breast,  at 62   • Stroke Father         CVA age 67     Social History     Socioeconomic History   • Marital status:      Spouse name: Not on file   • Number of children: Not on file   • Years of education: Not on file   • Highest education level: Not on file   Occupational History   • Not on file   Social Needs   • Financial resource strain: Not on file   • Food insecurity     Worry: Not on file     Inability: Not on file   • Transportation needs     Medical: Not on file     Non-medical: Not on file   Tobacco Use   • Smoking status: Current Every Day Smoker     Packs/day: 0.30     Types: Cigarettes   • Smokeless tobacco: Never Used   Substance and Sexual Activity   • Alcohol use: Yes     Alcohol/week: 1.8 oz     Types: 1 Cans of beer, 2 Shots of liquor per week     Frequency: 2-3 times a week     Drinks per session: 1 or 2   • Drug use: No   • Sexual activity: Not on file   Lifestyle   • Physical activity     Days per week: Not on file     Minutes per session: Not on file   • Stress: Not on file   Relationships   • Social connections     Talks on phone: Not on file     Gets together: Not on file     Attends Presybeterian service: Not on file     Active member of club or organization: Not on file     Attends meetings of clubs or organizations: Not on file     Relationship status: Not on file   • Intimate partner violence     Fear of current or ex partner: Not on file      "Emotionally abused: Not on file     Physically abused: Not on file     Forced sexual activity: Not on file   Other Topics Concern   • Not on file   Social History Narrative   • Not on file     Allergies   Allergen Reactions   • Typhoid Vaccines        Current Outpatient Medications   Medication Sig Dispense Refill   • oxyCODONE-acetaminophen (PERCOCET) 5-325 MG Tab Take 1 Tab by mouth every four hours as needed.     • triamcinolone acetonide (KENALOG) 0.1 % Cream      • ciclopirox (LOPROX) 0.77 % cream      • multivitamin (THERAGRAN) Tab Take 1 Tab by mouth every day.     • cyanocobalamin (VITAMIN B-12) 500 MCG Tab Take 500 mcg by mouth every day.     • coenzyme Q-10 30 MG capsule Take 60 mg by mouth every day.     • Krill Oil 350 MG Cap Take  by mouth.     • lisinopril (PRINIVIL) 2.5 MG Tab Take 1 Tab by mouth every day. 90 Tab 3     No current facility-administered medications for this visit.        ROS  All others systems reviewed and negative.     Objective:     /60 (BP Location: Left arm, Patient Position: Sitting)   Pulse (!) 48   Ht 1.778 m (5' 10\")   Wt 91.6 kg (202 lb)   SpO2 95%  Body mass index is 28.98 kg/m².    Physical Exam   General: No acute distress. Well nourished.  HEENT: EOM grossly intact, no scleral icterus, no pharyngeal erythema.   Neck:  No JVD, no bruits, trachea midline  CVS: Slow, regular, 3/6 mid peaking sys murmur R, LLSB, No LE edema.  2+ radial pulses, 2+ PT pulses  Resp: CTAB. No wheezing or crackles/rhonchi. Normal respiratory effort.  Abdomen: Soft, NT, no juliette hepatomegaly.  MSK/Ext: No clubbing or cyanosis.  Skin: Warm and dry, no rashes. Bruise over right chest  Neurological: CN III-XII grossly intact. No focal deficits. Resting tremor.  Psych: A&O x 3, appropriate affect, good judgement    Physical exam performed today and unchanged, except what is noted, compared to 1-      Assessment:     1. Rheumatic aortic stenosis     2. Other fatigue     3. Pure " hypercholesterolemia     4. Essential hypertension     5. Tobacco use     6. LVH (left ventricular hypertrophy)     7. Nonrheumatic aortic valve stenosis     8. Second degree heart block  EKG    Cardiac Event Monitor    CL-PERMANENT PACEMAKER INSERTION   9. Persistent atrial fibrillation (HCC)         Medical Decision Making:  Today's Assessment / Status / Plan:     -Needs a PM  -Probably in afib also, needing apixaban after PM  -He has HTN, see below, I suspect he will be headed toward meds, he takes in a lot of salt. Goal BP closer to 120/80 with LVH. Will address in the future  -He has very mild HLP, no meds yet  -Smoking- he had cut back, wondering if he can stop psychologically completely.  -Tremor, sounds like mostly resting, consider propranolol after PM  -Repeat echo 1-2 years for AS  -ASA 81 mg, primary prevention, I suspect afib and will need anticoagulation  -Needs to quit smoking  -RTC 1 months    Written instructions given today:    Checking Blood Pressure:  -Blood pressure cuff, spend in the $40-65, with good return policy  -It should be automatic, upper arm, measure your arm to get the correct size, probably adult Large  -Put the cuff in place, rest arm on table near height of your heart, sit quietly for 5 min, legs uncrossed, with back support, then take your blood pressure, write it down, keep a log  -Check no more than 1 time day, maybe 4-5 times per week, try different times of day.  -Can bring your cuff to at least one appointment where it can be calibrated to a manual cuff if you are concerned.  -Goal blood pressure is at least under 130/80, ideally under 120/80.  If you think your BP is overall too high, let us know in the office, we can adjust medications, can use [employer] or call the [facility] office: [phone].    Salt=sodium=sea salt, guidelines say stay under 2,500 mg daily but I ask for under 4,000 mg daily.  Get salt smart, start looking at labels, count it up.  Salt is hidden in  everything, salad dressing, sauces, cheese, most canned food, any processed meat.    -After I know more about your electrical system, I will probably ask you to increase your lisinopril dose from 2.5 mg to 5 mg, Max dose is 40 mg.    -My office will call you to have the pacemaker placed in [facility] in about 4 weeks (around 8/13/2020).    It is my pleasure to participate in the care of Mr. Bowling.  Please do not hesitate to contact me with questions or concerns.    Hayley Barker MD, [facility]  Cardiologist [facility]    Please note that this dictation was created using voice recognition software. I have made every reasonable attempt to correct obvious errors, but it is possible there are errors of grammar and possibly content that I did not discover before finalizing the note.

## 2020-07-16 ENCOUNTER — TELEPHONE (OUTPATIENT)
Dept: CARDIOLOGY | Facility: MEDICAL CENTER | Age: 75
End: 2020-07-16

## 2020-07-16 ENCOUNTER — OFFICE VISIT (OUTPATIENT)
Dept: CARDIOLOGY | Facility: CLINIC | Age: 75
End: 2020-07-16
Payer: MEDICARE

## 2020-07-16 VITALS
DIASTOLIC BLOOD PRESSURE: 60 MMHG | HEIGHT: 70 IN | HEART RATE: 48 BPM | WEIGHT: 202 LBS | OXYGEN SATURATION: 95 % | SYSTOLIC BLOOD PRESSURE: 140 MMHG | BODY MASS INDEX: 28.92 KG/M2

## 2020-07-16 DIAGNOSIS — I44.1 SECOND DEGREE HEART BLOCK: ICD-10-CM

## 2020-07-16 DIAGNOSIS — I51.7 LVH (LEFT VENTRICULAR HYPERTROPHY): ICD-10-CM

## 2020-07-16 DIAGNOSIS — I06.0 RHEUMATIC AORTIC STENOSIS: ICD-10-CM

## 2020-07-16 DIAGNOSIS — I10 ESSENTIAL HYPERTENSION: ICD-10-CM

## 2020-07-16 DIAGNOSIS — E78.00 PURE HYPERCHOLESTEROLEMIA: ICD-10-CM

## 2020-07-16 DIAGNOSIS — R53.83 OTHER FATIGUE: ICD-10-CM

## 2020-07-16 DIAGNOSIS — I35.0 NONRHEUMATIC AORTIC VALVE STENOSIS: ICD-10-CM

## 2020-07-16 DIAGNOSIS — I48.19 PERSISTENT ATRIAL FIBRILLATION (HCC): ICD-10-CM

## 2020-07-16 DIAGNOSIS — Z72.0 TOBACCO USE: ICD-10-CM

## 2020-07-16 LAB — EKG IMPRESSION: NORMAL

## 2020-07-16 PROCEDURE — 93000 ELECTROCARDIOGRAM COMPLETE: CPT | Performed by: INTERNAL MEDICINE

## 2020-07-16 PROCEDURE — 99215 OFFICE O/P EST HI 40 MIN: CPT | Performed by: INTERNAL MEDICINE

## 2020-07-16 RX ORDER — OXYCODONE HYDROCHLORIDE AND ACETAMINOPHEN 5; 325 MG/1; MG/1
1 TABLET ORAL EVERY 4 HOURS PRN
COMMUNITY
End: 2020-07-21

## 2020-07-16 ASSESSMENT — FIBROSIS 4 INDEX: FIB4 SCORE: 2.54

## 2020-07-16 NOTE — TELEPHONE ENCOUNTER
Patient scheduled for PM insert on 7-17-20 at [facility] with Dr. Cantrell. ZAID to Dr. Barker.

## 2020-07-16 NOTE — PATIENT INSTRUCTIONS
Checking Blood Pressure:  -Blood pressure cuff, spend in the $40-65, with good return policy  -It should be automatic, upper arm, measure your arm to get the correct size, probably adult Large  -Put the cuff in place, rest arm on table near height of your heart, sit quietly for 5 min, legs uncrossed, with back support, then take your blood pressure, write it down, keep a log  -Check no more than 1 time day, maybe 4-5 times per week, try different times of day.  -Can bring your cuff to at least one appointment where it can be calibrated to a manual cuff if you are concerned.  -Goal blood pressure is at least under 130/80, ideally under 120/80.  If you think your BP is overall too high, let us know in the office, we can adjust medications, can use [employer] or call the [place] office: [phone].    Salt=sodium=sea salt, guidelines say stay under 2,500 mg daily but I ask for under 4,000 mg daily.  Get salt smart, start looking at labels, count it up.  Salt is hidden in everything, salad dressing, sauces, cheese, most canned food, any processed meat.    -After I know more about your electrical system, I will probably ask you to increase your lisinopril dose from 2.5 mg to 5 mg, Max dose is 40 mg.    -My office will call you to have the pacemaker placed in [place]

## 2020-07-16 NOTE — LETTER
[facility] [address],   [address]nd Floor  [place] [address]  Phone: [phone]  Fax: [phone]              Jacobo Bowling  1945    Encounter Date: 2020    Hayley Barker M.D.          PROGRESS NOTE:  Subjective:   Chief Complaint:   Chief Complaint   Patient presents with   • HTN (Controlled)       Jacobo Bowling is a 74 y.o. male who returns for mild to mod AS, prior rheumatic heart disease and fatigue, HTN with LVH.    At age 11, had rheumatic fever, joint pain, could not walk, then heart murmur same time, was in the hospital for weeks. Then murmur went away, was in the [employer] for many years. They did extensive testing, did not find anything wrong.  Our echo 12-19 with mild to mod AS.    Has HTN with mild LVH, no meds yet.  Still smoking, was 8-10 cig per day, smoking since age 15, down to 1-2, just a few puffs.  Has mild HLP,     Noted on ecg to have sinus beatriz.  I was called about an ecg with second degree heart beat post surgery, I actually suspect high grade heart block.  Today, probably in afib though cannot exclude SSS with high grade heart block    He is not limited by chest pain, pressure or tightness with activity.   No significant dyspnea on exertion, orthopnea or lower extremity swelling.    Left eye operation, some balance problems.  No symptoms of leg claudication.   No stroke/TIA like symptoms.  His balance feels off a bit after eye surgery.  Has noted from fatigue, slowing down.    He is having some fatigue.  No presyncope/syncope.     No family history of premature coronary artery disease.  Father had CVA around age 67.  Mother  at 62 of breast CA.  No siblings.   No history of diabetes.  No history of autoimmune disease such as lupus or rheumatoid arthritis.  No chronic kidney disease.  Rare ETOH.    Put on 12 pounds, planning to lose it.  Likes salt, sun flower seeds with salt.    From [place]  [place], mother [place], Father Polish, fought with [place] resistance, he was conceived during the blitzkrieg, [place], age 8. Lived in [place], until the [place] after high school. Retired from [place], [place], moved to [facility], then [place], was too hot, landed here, lived in [place].  Raised 2 daughters in [place] and [place].  One daughter in [place], one in [place]. Son-in-law Principal at [employer].    Wife is Amy (goes by \"Kenneth,\" does not like Amy), not here today, in good health,  44 years. They met in [facility], she is from [place].    DATA REVIEWED by me:  ECG [id number]  Probably afib, 40 though cannot exclude SSS with high grade heart block      ECG 6-  Sinus bradycardia, shaynebach, 41, heart block    ECG 12-17-19  Sinus, 56, first degree AV delay.    Echo 12-18-19  No prior study is available for comparison.   Left ventricular ejection fraction is visually estimated to be 65%.  Mild concentric left ventricular hypertrophy.  Mildly dilated left atrium.  Calcified aortic valve leaflets, rheumatic appearing.  Mild to moderate aortic stenosis, visually appears moderate: Vmax is    2.9 m/s, MG 20 mmHg, BRE 1.4 cm2.  Trace tricuspid regurgitation.  Estimated right ventricular systolic pressure  is 30 mmHg.     Most recent labs:     Lab Results   Component Value Date/Time    HEMOGLOBIN 14.9 08/15/2019 04:18 AM    HEMATOCRIT 43.3 08/15/2019 04:18 AM     (H) 08/15/2019 04:18 AM    TSH 3.320 08/15/2019 04:18 AM      Lab Results   Component Value Date/Time    SODIUM 138 06/09/2020 09:20 AM    POTASSIUM 4.8 06/09/2020 09:20 AM    CHLORIDE 105 06/09/2020 09:20 AM    CO2 27 06/09/2020 09:20 AM    GLUCOSE 91 06/09/2020 09:20 AM    BUN 23 (H) 06/09/2020 09:20 AM    CREATININE 1.2 06/09/2020 09:20 AM      Lab Results   Component Value Date/Time    ASTSGOT 20 08/15/2019 04:18 AM    ALTSGPT 18 08/15/2019 04:18 AM    ALBUMIN 4.5 08/15/2019 04:18 AM      Lab Results   Component Value " Date/Time    CHOLSTRLTOT 184 08/15/2019 04:18 AM     (H) 08/15/2019 04:18 AM    HDL 43 08/15/2019 04:18 AM    TRIGLYCERIDE 145 08/15/2019 04:18 AM       8-15-19 H 14.9, p 141, na 140, k 4.5, cr 1.12, lfts normal, ldl 112, hdl 43, tg 145, tc 184, TSH 3.32    Past Medical History:   Diagnosis Date   • History of chickenpox    • History of measles    • History of mumps    • History of rheumatic fever    • Hypertension      Past Surgical History:   Procedure Laterality Date   • PB RECONSTR TOTAL SHOULDER IMPLANT Right 2020    Procedure: RT ARTHROPLASTY, SHOULDER, TOTAL;  Surgeon: Arsenio Hernandez M.D.;  Location: SURGERY [facility];  Service: Orthopedics   • OTHER      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   • OTHER ORTHOPEDIC SURGERY      bilat RCR     Family History   Problem Relation Age of Onset   • Cancer Mother         Breast,  at 62   • Stroke Father         CVA age 67     Social History     Socioeconomic History   • Marital status:      Spouse name: Not on file   • Number of children: Not on file   • Years of education: Not on file   • Highest education level: Not on file   Occupational History   • Not on file   Social Needs   • Financial resource strain: Not on file   • Food insecurity     Worry: Not on file     Inability: Not on file   • Transportation needs     Medical: Not on file     Non-medical: Not on file   Tobacco Use   • Smoking status: Current Every Day Smoker     Packs/day: 0.30     Types: Cigarettes   • Smokeless tobacco: Never Used   Substance and Sexual Activity   • Alcohol use: Yes     Alcohol/week: 1.8 oz     Types: 1 Cans of beer, 2 Shots of liquor per week     Frequency: 2-3 times a week     Drinks per session: 1 or 2   • Drug use: No   • Sexual activity: Not on file   Lifestyle   • Physical activity     Days per week: Not on file     Minutes per session: Not on file   • Stress: Not on file   Relationships   • Social connections     Talks on phone: Not on file     Gets  "together: Not on file     Attends Restorationist service: Not on file     Active member of club or organization: Not on file     Attends meetings of clubs or organizations: Not on file     Relationship status: Not on file   • Intimate partner violence     Fear of current or ex partner: Not on file     Emotionally abused: Not on file     Physically abused: Not on file     Forced sexual activity: Not on file   Other Topics Concern   • Not on file   Social History Narrative   • Not on file     Allergies   Allergen Reactions   • Typhoid Vaccines        Current Outpatient Medications   Medication Sig Dispense Refill   • oxyCODONE-acetaminophen (PERCOCET) 5-325 MG Tab Take 1 Tab by mouth every four hours as needed.     • triamcinolone acetonide (KENALOG) 0.1 % Cream      • ciclopirox (LOPROX) 0.77 % cream      • multivitamin (THERAGRAN) Tab Take 1 Tab by mouth every day.     • cyanocobalamin (VITAMIN B-12) 500 MCG Tab Take 500 mcg by mouth every day.     • coenzyme Q-10 30 MG capsule Take 60 mg by mouth every day.     • Krill Oil 350 MG Cap Take  by mouth.     • lisinopril (PRINIVIL) 2.5 MG Tab Take 1 Tab by mouth every day. 90 Tab 3     No current facility-administered medications for this visit.        ROS  All others systems reviewed and negative.     Objective:     /60 (BP Location: Left arm, Patient Position: Sitting)   Pulse (!) 48   Ht 1.778 m (5' 10\")   Wt 91.6 kg (202 lb)   SpO2 95%  Body mass index is 28.98 kg/m².    Physical Exam   General: No acute distress. Well nourished.  HEENT: EOM grossly intact, no scleral icterus, no pharyngeal erythema.   Neck:  No JVD, no bruits, trachea midline  CVS: Slow, regular, 3/6 mid peaking sys murmur R, LLSB, No LE edema.  2+ radial pulses, 2+ PT pulses  Resp: CTAB. No wheezing or crackles/rhonchi. Normal respiratory effort.  Abdomen: Soft, NT, no juliette hepatomegaly.  MSK/Ext: No clubbing or cyanosis.  Skin: Warm and dry, no rashes. Bruise over right chest  Neurological: " CN III-XII grossly intact. No focal deficits. Resting tremor.  Psych: A&O x 3, appropriate affect, good judgement    Physical exam performed today and unchanged, except what is noted, compared to 1-      Assessment:     1. Rheumatic aortic stenosis     2. Other fatigue     3. Pure hypercholesterolemia     4. Essential hypertension     5. Tobacco use     6. LVH (left ventricular hypertrophy)     7. Nonrheumatic aortic valve stenosis     8. Second degree heart block  EKG    Cardiac Event Monitor    CL-PERMANENT PACEMAKER INSERTION   9. Persistent atrial fibrillation (HCC)         Medical Decision Making:  Today's Assessment / Status / Plan:     -Needs a PM  -Probably in afib also, needing apixaban after PM  -He has HTN, see below, I suspect he will be headed toward meds, he takes in a lot of salt. Goal BP closer to 120/80 with LVH. Will address in the future  -He has very mild HLP, no meds yet  -Smoking- he had cut back, wondering if he can stop psychologically completely.  -Tremor, sounds like mostly resting, consider propranolol after PM  -Repeat echo 1-2 years for AS  -ASA 81 mg, primary prevention, I suspect afib and will need anticoagulation  -Needs to quit smoking  -RTC 1 months    Written instructions given today:    Checking Blood Pressure:  -Blood pressure cuff, spend in the $40-65, with good return policy  -It should be automatic, upper arm, measure your arm to get the correct size, probably adult Large  -Put the cuff in place, rest arm on table near height of your heart, sit quietly for 5 min, legs uncrossed, with back support, then take your blood pressure, write it down, keep a log  -Check no more than 1 time day, maybe 4-5 times per week, try different times of day.  -Can bring your cuff to at least one appointment where it can be calibrated to a manual cuff if you are concerned.  -Goal blood pressure is at least under 130/80, ideally under 120/80.  If you think your BP is overall too high, let us  know in the office, we can adjust medications, can use MyChart or call the [place] office: [phone].    Salt=sodium=sea salt, guidelines say stay under 2,500 mg daily but I ask for under 4,000 mg daily.  Get salt smart, start looking at labels, count it up.  Salt is hidden in everything, salad dressing, sauces, cheese, most canned food, any processed meat.    -After I know more about your electrical system, I will probably ask you to increase your lisinopril dose from 2.5 mg to 5 mg, Max dose is 40 mg.    -My office will call you to have the pacemaker placed in [place]    Return in about 4 weeks (around 8/13/2020).    It is my pleasure to participate in the care of Mr. Bowling.  Please do not hesitate to contact me with questions or concerns.    Hayley Barker MD, [facility]  Cardiologist [facility]    Please note that this dictation was created using voice recognition software. I have made every reasonable attempt to correct obvious errors, but it is possible there are errors of grammar and possibly content that I did not discover before finalizing the note.      Han Cavazos, P.A.-C.  [address]  [place] [address]  VIA In Basket

## 2020-07-17 ENCOUNTER — HOSPITAL ENCOUNTER (OUTPATIENT)
Facility: MEDICAL CENTER | Age: 75
End: 2020-07-17
Attending: INTERNAL MEDICINE | Admitting: INTERNAL MEDICINE
Payer: MEDICARE

## 2020-07-17 ENCOUNTER — APPOINTMENT (OUTPATIENT)
Dept: RADIOLOGY | Facility: MEDICAL CENTER | Age: 75
End: 2020-07-17
Attending: INTERNAL MEDICINE
Payer: MEDICARE

## 2020-07-17 ENCOUNTER — APPOINTMENT (OUTPATIENT)
Dept: CARDIOLOGY | Facility: MEDICAL CENTER | Age: 75
End: 2020-07-17
Attending: INTERNAL MEDICINE
Payer: MEDICARE

## 2020-07-17 VITALS
TEMPERATURE: 97.1 F | WEIGHT: 203.93 LBS | HEART RATE: 60 BPM | OXYGEN SATURATION: 98 % | RESPIRATION RATE: 18 BRPM | HEIGHT: 70 IN | SYSTOLIC BLOOD PRESSURE: 157 MMHG | DIASTOLIC BLOOD PRESSURE: 83 MMHG | BODY MASS INDEX: 29.19 KG/M2

## 2020-07-17 DIAGNOSIS — I44.1 SECOND DEGREE HEART BLOCK: ICD-10-CM

## 2020-07-17 LAB
ALBUMIN SERPL BCP-MCNC: 4 G/DL (ref 3.2–4.9)
ALBUMIN/GLOB SERPL: 1.5 G/DL
ALP SERPL-CCNC: 65 U/L (ref 30–99)
ALT SERPL-CCNC: 18 U/L (ref 2–50)
ANION GAP SERPL CALC-SCNC: 12 MMOL/L (ref 7–16)
AST SERPL-CCNC: 16 U/L (ref 12–45)
BILIRUB SERPL-MCNC: 0.4 MG/DL (ref 0.1–1.5)
BUN SERPL-MCNC: 19 MG/DL (ref 8–22)
CALCIUM SERPL-MCNC: 9 MG/DL (ref 8.5–10.5)
CHLORIDE SERPL-SCNC: 105 MMOL/L (ref 96–112)
CO2 SERPL-SCNC: 22 MMOL/L (ref 20–33)
CREAT SERPL-MCNC: 0.77 MG/DL (ref 0.5–1.4)
EKG IMPRESSION: NORMAL
ERYTHROCYTE [DISTWIDTH] IN BLOOD BY AUTOMATED COUNT: 61 FL (ref 35.9–50)
GLOBULIN SER CALC-MCNC: 2.6 G/DL (ref 1.9–3.5)
GLUCOSE SERPL-MCNC: 97 MG/DL (ref 65–99)
HCT VFR BLD AUTO: 35.8 % (ref 42–52)
HGB BLD-MCNC: 12 G/DL (ref 14–18)
INR PPP: 1.02 (ref 0.87–1.13)
MCH RBC QN AUTO: 36.7 PG (ref 27–33)
MCHC RBC AUTO-ENTMCNC: 33.5 G/DL (ref 33.7–35.3)
MCV RBC AUTO: 109.5 FL (ref 81.4–97.8)
PLATELET # BLD AUTO: 191 K/UL (ref 164–446)
PMV BLD AUTO: 11.4 FL (ref 9–12.9)
POTASSIUM SERPL-SCNC: 4.5 MMOL/L (ref 3.6–5.5)
PROT SERPL-MCNC: 6.6 G/DL (ref 6–8.2)
PROTHROMBIN TIME: 13.7 SEC (ref 12–14.6)
RBC # BLD AUTO: 3.27 M/UL (ref 4.7–6.1)
SODIUM SERPL-SCNC: 139 MMOL/L (ref 135–145)
WBC # BLD AUTO: 8.4 K/UL (ref 4.8–10.8)

## 2020-07-17 PROCEDURE — 80053 COMPREHEN METABOLIC PANEL: CPT

## 2020-07-17 PROCEDURE — 93010 ELECTROCARDIOGRAM REPORT: CPT | Performed by: INTERNAL MEDICINE

## 2020-07-17 PROCEDURE — 160002 HCHG RECOVERY MINUTES (STAT)

## 2020-07-17 PROCEDURE — 33208 INSRT HEART PM ATRIAL & VENT: CPT

## 2020-07-17 PROCEDURE — 33208 INSRT HEART PM ATRIAL & VENT: CPT | Mod: KX | Performed by: INTERNAL MEDICINE

## 2020-07-17 PROCEDURE — 700117 HCHG RX CONTRAST REV CODE 255: Performed by: INTERNAL MEDICINE

## 2020-07-17 PROCEDURE — 85027 COMPLETE CBC AUTOMATED: CPT

## 2020-07-17 PROCEDURE — 85610 PROTHROMBIN TIME: CPT

## 2020-07-17 PROCEDURE — 700101 HCHG RX REV CODE 250

## 2020-07-17 PROCEDURE — 99152 MOD SED SAME PHYS/QHP 5/>YRS: CPT | Performed by: INTERNAL MEDICINE

## 2020-07-17 PROCEDURE — 700111 HCHG RX REV CODE 636 W/ 250 OVERRIDE (IP)

## 2020-07-17 PROCEDURE — 71045 X-RAY EXAM CHEST 1 VIEW: CPT

## 2020-07-17 PROCEDURE — 93005 ELECTROCARDIOGRAM TRACING: CPT | Performed by: INTERNAL MEDICINE

## 2020-07-17 RX ORDER — ACETAMINOPHEN 325 MG/1
650 TABLET ORAL EVERY 4 HOURS PRN
Status: DISCONTINUED | OUTPATIENT
Start: 2020-07-17 | End: 2020-07-17 | Stop reason: HOSPADM

## 2020-07-17 RX ORDER — CELECOXIB 100 MG/1
100 CAPSULE ORAL PRN
COMMUNITY
End: 2020-08-19

## 2020-07-17 RX ORDER — ASPIRIN 81 MG/1
81 TABLET, CHEWABLE ORAL DAILY
COMMUNITY
End: 2020-07-21

## 2020-07-17 RX ORDER — TRAMADOL HYDROCHLORIDE 50 MG/1
50 TABLET ORAL EVERY 6 HOURS PRN
Status: DISCONTINUED | OUTPATIENT
Start: 2020-07-17 | End: 2020-07-17 | Stop reason: HOSPADM

## 2020-07-17 RX ORDER — MIDAZOLAM HYDROCHLORIDE 1 MG/ML
INJECTION INTRAMUSCULAR; INTRAVENOUS
Status: COMPLETED
Start: 2020-07-17 | End: 2020-07-17

## 2020-07-17 RX ORDER — LIDOCAINE HYDROCHLORIDE 20 MG/ML
INJECTION, SOLUTION INFILTRATION; PERINEURAL
Status: COMPLETED
Start: 2020-07-17 | End: 2020-07-17

## 2020-07-17 RX ORDER — DOXYCYCLINE 100 MG/1
100 CAPSULE ORAL 2 TIMES DAILY
Qty: 8 CAP | Refills: 0 | Status: SHIPPED | OUTPATIENT
Start: 2020-07-17 | End: 2020-07-21

## 2020-07-17 RX ORDER — CEFAZOLIN SODIUM 1 G/3ML
INJECTION, POWDER, FOR SOLUTION INTRAMUSCULAR; INTRAVENOUS
Status: COMPLETED
Start: 2020-07-17 | End: 2020-07-17

## 2020-07-17 RX ADMIN — MIDAZOLAM HYDROCHLORIDE 2 MG: 1 INJECTION, SOLUTION INTRAMUSCULAR; INTRAVENOUS at 09:24

## 2020-07-17 RX ADMIN — FENTANYL CITRATE 100 MCG: 50 INJECTION INTRAMUSCULAR; INTRAVENOUS at 09:24

## 2020-07-17 RX ADMIN — IOHEXOL 10 ML: 350 INJECTION, SOLUTION INTRAVENOUS at 09:26

## 2020-07-17 RX ADMIN — CEFAZOLIN 3000 MG: 330 INJECTION, POWDER, FOR SOLUTION INTRAMUSCULAR; INTRAVENOUS at 09:23

## 2020-07-17 RX ADMIN — LIDOCAINE HYDROCHLORIDE: 20 INJECTION, SOLUTION INFILTRATION; PERINEURAL at 09:24

## 2020-07-17 RX ADMIN — MIDAZOLAM HYDROCHLORIDE 1 MG: 1 INJECTION, SOLUTION INTRAMUSCULAR; INTRAVENOUS at 09:43

## 2020-07-17 ASSESSMENT — FIBROSIS 4 INDEX: FIB4 SCORE: 2.54

## 2020-07-17 NOTE — DISCHARGE INSTRUCTIONS
ACTIVITY: Rest and take it easy for the first 24 hours.  A responsible adult is recommended to remain with you during that time.  It is normal to feel sleepy.  We encourage you to not do anything that requires balance, judgment or coordination.    MILD FLU-LIKE SYMPTOMS ARE NORMAL. YOU MAY EXPERIENCE GENERALIZED MUSCLE ACHES, THROAT IRRITATION, HEADACHE AND/OR SOME NAUSEA.    FOR 24 HOURS DO NOT:  Drive, operate machinery or run household appliances.  Drink beer or alcoholic beverages.   Make important decisions or sign legal documents.    SPECIAL INSTRUCTIONS:   PACEMAKER     DISCHARGE INSTRUCTIONS      WOUND CARE:    • No Showers for one week, you may take a sponge bath.  Keep your dressing dry.  No submerging in bath tub, hot tub, swimming, etc. for six weeks.  • Leave your dressing in place until your follow up appointment.    • No lifting over 5-10 pounds for six weeks; this applies to affected side only.  • Do not raise affected arm above shoulder level for 4-6 weeks.  • Avoid excessive pushing, pulling, or twisting for six weeks; this applies to affected side only.  • Call your doctor’s office if you notice any increased swelling, redness, or any drainage at the incision site.  Also notify your doctor if you develop a fever.  • You can also call the [facility] Hotline open 24 hours/day, 7 days/week and speak to a nurse at [phone], or toll free at [phone].  • Seven to ten days after implant, your cardiologist’s office will schedule a visit for you with a nurse to check your incision site.  The nurse will remove your original dressing at this time.  If you have an AICD, you will be enrolled in an AICD clinic.  • For AICD’s - you should be checked every three months or as determined by your cardiologist.    • Do not drive until you have been cleared to do so by your cardiologist.  • Call 911 if you develop problems with breathing or chest pain.      DIET: To avoid nausea, slowly advance diet as  tolerated, avoiding spicy or greasy foods for the first day.  Add more substantial food to your diet according to your physician's instructions. INCREASE FLUIDS AND FIBER TO AVOID CONSTIPATION.    SURGICAL DRESSING/BATHING:   Keep dressing and incision dry and intact for 7 days. Sponge bath only for 7 days. May remove dressing and shower after your follow-up appointment in 1 week. If steri strips in place underneath outer dressing, please allow to fall off on their own.     FOLLOW-UP APPOINTMENT:  A follow-up appointment should be arranged with your doctor; call to schedule.    You should CALL YOUR PHYSICIAN if you develop:  Fever greater than 101 degrees F.  Pain not relieved by medication, or persistent nausea or vomiting.  Excessive bleeding (blood soaking through dressing) or unexpected drainage from the wound.  Extreme redness or swelling around the incision site, drainage of pus or foul smelling drainage.  Inability to urinate or empty your bladder within 8 hours.  Problems with breathing or chest pain.    You should call 911 if you develop problems with breathing or chest pain.  If you are unable to contact your doctor or surgical center, you should go to the nearest emergency room or urgent care center.  Physician's telephone #: [phone]    If any questions arise, call your doctor.  If your doctor is not available, please feel free to call the Surgical Center at [phone].  The Center is open Monday through Friday from 7AM to 7PM.  You can also call the [facility] HOTLINE open 24 hours/day, 7 days/week and speak to a nurse at [phone], or toll free at [phone].    A registered nurse may call you a few days after your surgery to see how you are doing after your procedure.    MEDICATIONS: Resume taking daily medication.  Take prescribed pain medication with food.  If no medication is prescribed, you may take non-aspirin pain medication if needed.  PAIN MEDICATION CAN BE VERY CONSTIPATING.  Take a  stool softener or laxative such as senokot, pericolace, or milk of magnesia if needed.    If your physician has prescribed pain medication that includes Acetaminophen (Tylenol), do not take additional Acetaminophen (Tylenol) while taking the prescribed medication.    Depression / Suicide Risk    As you are discharged from this [facility] facility, it is important to learn how to keep safe from harming yourself.    Recognize the warning signs:  · Abrupt changes in personality, positive or negative- including increase in energy   · Giving away possessions  · Change in eating patterns- significant weight changes-  positive or negative  · Change in sleeping patterns- unable to sleep or sleeping all the time   · Unwillingness or inability to communicate  · Depression  · Unusual sadness, discouragement and loneliness  · Talk of wanting to die  · Neglect of personal appearance   · Rebelliousness- reckless behavior  · Withdrawal from people/activities they love  · Confusion- inability to concentrate     If you or a loved one observes any of these behaviors or has concerns about self-harm, here's what you can do:  · Talk about it- your feelings and reasons for harming yourself  · Remove any means that you might use to hurt yourself (examples: pills, rope, extension cords, firearm)  · Get professional help from the community (Mental Health, Substance Abuse, psychological counseling)  · Do not be alone:Call your Safe Contact- someone whom you trust who will be there for you.  · Call your local CRISIS HOTLINE 907-7799 or 933-433-7528  · Call your local Children's Mobile Crisis Response Team [place] (545) 890-3990 or www.[employer]  · Call the toll free National Suicide Prevention Hotlines   · National Suicide Prevention Lifeline 170-025-MLVW (8526)  · National Hope Line Network 800-SUICIDE (305-5626)

## 2020-07-17 NOTE — OP REPORT
Electrophysiology Procedure Note  [facility]    PROCEDURE PERFORMED: Dual-chamber pacemaker, moderate sedation administered by RN and supervised by physician    : Scott Cantrell MD    ASSISTANT: none    ANESTHESIA: Moderate sedation,  start time 907, stop time 945  the moderate sedation document has been reviewed, signed and scanned into media     EBL: 30 cc    SPECIMENS: None    INDICATION: Mobitz 1 with symptoms    PRE PROCEDURE ECG: Sinus rhythm with Mobitz 1    POST PROCEDURE ECG: Sinus rhythm with RV pacing     COMPLICATIONS: None    DESCRIPTION OF PROCEDURE:  After informed written consent, the patient was brought to the electrophysiology lab in the fasting, unsedated state. The patient was prepped and draped in the usual sterile fashion. The procedure was performed under moderate sedation with local anesthetic.   A left infraclavicular incision was made with a scalpel and the pectoral device pocket was created using a combination of blunt dissection and electrocautery. The modified Seldinger technique was used to gain access to the left axillary vein times two. Two peel-away hemostasis sheaths were placed in the vein. Under fluoroscopic guidance, the pacemaker leads were introduced into the heart. The ventricular lead was advanced to the RVOT and then lowered into position at the RV apex. The atrial lead was positioned in the right atrial appendage. The leads were fixated in normal mechanism. The leads were tested and had satisfactory sensing and pacing parameters. High output ventricular pacing did not produce extracardiac stimulation. The leads were sutured to the underlying pectoral muscle with interrupted non absorbable suture over a silastic suture sleeve. The device pocket was irrigated with antibiotic solution, inspected, and no bleeding was seen. The leads were connected to the dual chamber pacemaker pulse generator and the device was inserted into the pocket The wound was  closed with three layers of absorbable sutures.  I personally supervised the administration of moderate sedation by the RN and observed the level of consciousness and physiologic status throughout the procedure.   Following recovery from sedation, the patient was transferred to a monitored bed in good condition.     IMPLANTED DEVICE INFORMATION:  Pulse generator is a [place] model L311  Serial #[id number]    LEAD INFORMATION:  1)Right atrial lead is a [place] model #4470, serial #[id number],P wave 3.5 millivolts, threshold 0.5 volts, pacing impedance 335 ohms.    2)Right ventricular lead is a [place] model #7742, serial #[id number],R wave 9 millivolts, threshold 0.5 volts, pacing impedance 852 ohms.    DEVICE PROGRAMMING:  DDDR    FLUOROSCOPY TIME: 2 min    IMPRESSIONS:  1. Successful dual-chamber pacemaker implantation    RECOMMENDATIONS:  1. Transfer to monitored bed  2. PA and lateral chest x-ray  3. Device interrogation prior to hospital discharge  4. Followup in device clinic

## 2020-07-17 NOTE — OR NURSING
1004 Pt over from cath lab post pacemaker insertion. Left chest site CDI and soft. No signs of bleeding. Pt awake and alert, denies pain or nausea. VSS.  1010 Tolerating orals  1015 EKG at bedside  1030 Xray at bedside  1145 Dr. Cantrell to bedside  1205 Discharge instructions provided to pt and spouse. Discussed diet, activity, follow up, prescriptions and symptom management. Pt and spouse state understanding. Pt and spouse state all questions have been answered. Copy of discharge provided to pt and spouse.  1230 Xray resulted, no pneumothorax identified  1231 Pt ambulated to restroom, tolerated well  1235 Criteria met  1245 Pt wheeled off of unit with all belongings without incident.

## 2020-07-17 NOTE — H&P
Physician H&P    Patient ID:  Jacobo Bowling  [id number]  74 y.o. male  1945    History:  Primary Diagnosis: Mobitz 1 heart block and paroxysmal atrial fibrillation for permanent pacemaker    HPI:  Placed on schedule by Dr. Barker.  Mild fatigue no syncope.  No constitutional symptoms.  Not on any AV lux blocking agents.  Has not been placed on anticoagulation as of yet.  For permanent pacemaker.  [employer].  .  Smokes does not drink.  No history of heart attack or stroke.  No history of congestive heart failure.  Recent right shoulder surgery.  Right-handed.  Mild to moderate aortic stenosis    Past Medical History:  has a past medical history of History of chickenpox, History of measles, History of mumps, History of rheumatic fever, and Hypertension.  Past Surgical History:  has a past surgical history that includes other; other orthopedic surgery; and pr reconstr total shoulder implant (Right, 2020).  Past Social History:  reports that he has been smoking cigarettes. He has been smoking about 0.30 packs per day. He has never used smokeless tobacco. He reports current alcohol use of about 1.8 oz of alcohol per week. He reports that he does not use drugs.  Past Family History:   Family History   Problem Relation Age of Onset   • Cancer Mother         Breast,  at 62   • Stroke Father         CVA age 67     Allergies: Typhoid vaccines    Current Medications:  Prior to Admission medications    Medication Sig Start Date End Date Taking? Authorizing Provider   celecoxib (CELEBREX) 100 MG Cap Take 100 mg by mouth as needed.   Yes Physician Outpatient   aspirin (ASA) 81 MG Chew Tab chewable tablet Take 81 mg by mouth every day.   Yes Physician Outpatient   oxyCODONE-acetaminophen (PERCOCET) 5-325 MG Tab Take 1 Tab by mouth every four hours as needed.    Physician Outpatient   triamcinolone acetonide (KENALOG) 0.1 % Cream  4/10/20   Physician Outpatient   ciclopirox (LOPROX) 0.77 % cream   "4/10/20   Physician Outpatient   multivitamin (THERAGRAN) Tab Take 1 Tab by mouth every day.    Physician Outpatient   cyanocobalamin (VITAMIN B-12) 500 MCG Tab Take 500 mcg by mouth every day.    Physician Outpatient   coenzyme Q-10 30 MG capsule Take 60 mg by mouth every day.    Physician Outpatient   Krill Oil 350 MG Cap Take  by mouth.    Physician Outpatient   lisinopril (PRINIVIL) 2.5 MG Tab Take 1 Tab by mouth every day. 1/17/20   Hayley Barker M.D.       Review of Systems:  ROS  /68   Pulse (!) 50   Temp 36.4 °C (97.6 °F) (Temporal)   Resp 20   Ht 1.778 m (5' 10\")   Wt 92.5 kg (203 lb 14.8 oz)   SpO2 99%     Physical Examination:  Physical Exam  Vitals signs reviewed.   Constitutional:       General: He is not in acute distress.     Appearance: He is well-developed. He is not diaphoretic.   Eyes:      Conjunctiva/sclera: Conjunctivae normal.   Neck:      Thyroid: No thyroid mass or thyromegaly.      Vascular: No JVD.      Trachea: No tracheal deviation.   Cardiovascular:      Rate and Rhythm: Normal rate and regular rhythm.      Heart sounds: Murmur present. Systolic murmur present with a grade of 3/6.   Pulmonary:      Effort: Pulmonary effort is normal. No respiratory distress.      Breath sounds: Normal breath sounds.   Chest:      Chest wall: No tenderness.   Abdominal:      Palpations: Abdomen is soft.      Tenderness: There is no abdominal tenderness.   Musculoskeletal: Normal range of motion.   Skin:     General: Skin is warm and dry.   Neurological:      Mental Status: He is alert and oriented to person, place, and time.      Motor: No tremor.   Psychiatric:         Behavior: Behavior normal.         Impression:  1.  Symptomatic bradycardia as above for permanent pacemaker.  2.  Atrial arrhythmias.  Outpatient initiation of NOAC at follow-up.  3.  Moderate aortic stenosis.  The risks, benefits, and alternatives to permanent pacemaker placement were discussed in great " detail.  Specific risks mentioned to the patient including bleeding, cardiac perforation with possible tamponade possibly requiring pericardiocentesis or open heart surgery.  In addition the possibility of lead dislodgment (2-3%), pneumothorax (3%), hemothorax, infection were discussed.  Also, mentioned were the risk of death, stroke, and myocardial infarction.  The patient verbalized understanding of the potential complications and wishes to proceed with the procedure.          Pre Procedure Assessment:  Pre-Procedure Assessment - Applicable for patients receiving sedation by non-anesthesia practitioner.  Previous drug history, other anesthetic experiences, potential anesthetic problems and choice of anesthesia assessed, discussed with patient.     No prior complications.  Results of relevant diagnostic studies reviewed.    Plan of Sedation:  Patient assessed immediately prior to sedationPatient deemed appropriate candidate for conscious sedation options and plans discussed with patient / family    ASA 3 - Patient with severe systemic disease

## 2020-07-17 NOTE — TELEPHONE ENCOUNTER
Patient Call     Hayley Barker M.D.  You 1 hour ago (12:24 PM)       Patient just had a pacemaker placed.  Dr. Cantrell agreed he has paroxysmal atrial fibrillation.  He should start apixaban 5 mg twice daily.  No aspirin.  If he is agreeable, please have him start the medication.  This is to reduce his risk of stroke.  If he is not willing to start the medication, have him put in with a follow-up appointment with myself in [place] or [place] or with Anuja in [place] to discuss further.   Thank you!    Message text      Pt called on both phone lines. No answer, voicemail left on home phone with above information re: Rx. Request for call back and contact information provided.      Pt called. No answer, voicemail left on cell phone with information that he could discuss with any provider oncall tonight or he could reach me after 8 AM on Monday if he was unable to reach us before 5 PM this evening. Request for call back and contact information provided.

## 2020-07-21 NOTE — TELEPHONE ENCOUNTER
Pt calls back.  line [phone] cold transfers call. Pt states he tried to call x 2 yesterday and even left a message with someone.    Lengthy call. Thoroughly discussed MD recommendation. Risk vs benefits. Pt states he stopped ASA after shoulder surgery, not currently taking ASA. Went over entire med list. Removed what patient is not taking.  Pt is open to starting medication but would also like MD f/u appt for discussion. Pt scheduled for 7/23 in [place] office. Pt very appreciative and pleased with call and information provided.     Rx sent per MD advisement to pharmacy location confirmed with patient during call.

## 2020-07-23 ENCOUNTER — OFFICE VISIT (OUTPATIENT)
Dept: CARDIOLOGY | Facility: CLINIC | Age: 75
End: 2020-07-23
Payer: MEDICARE

## 2020-07-23 VITALS
WEIGHT: 203 LBS | SYSTOLIC BLOOD PRESSURE: 130 MMHG | HEART RATE: 60 BPM | HEIGHT: 71 IN | DIASTOLIC BLOOD PRESSURE: 60 MMHG | BODY MASS INDEX: 28.42 KG/M2 | OXYGEN SATURATION: 94 %

## 2020-07-23 DIAGNOSIS — E78.00 PURE HYPERCHOLESTEROLEMIA: ICD-10-CM

## 2020-07-23 DIAGNOSIS — I06.0 RHEUMATIC AORTIC STENOSIS: ICD-10-CM

## 2020-07-23 DIAGNOSIS — Z95.0 CARDIAC PACEMAKER IN SITU: ICD-10-CM

## 2020-07-23 DIAGNOSIS — Z79.01 CHRONIC ANTICOAGULATION: ICD-10-CM

## 2020-07-23 DIAGNOSIS — I48.0 PAROXYSMAL ATRIAL FIBRILLATION (HCC): ICD-10-CM

## 2020-07-23 DIAGNOSIS — Z72.0 TOBACCO USE: ICD-10-CM

## 2020-07-23 DIAGNOSIS — I35.0 NONRHEUMATIC AORTIC VALVE STENOSIS: ICD-10-CM

## 2020-07-23 DIAGNOSIS — R53.83 OTHER FATIGUE: ICD-10-CM

## 2020-07-23 DIAGNOSIS — I51.7 LVH (LEFT VENTRICULAR HYPERTROPHY): ICD-10-CM

## 2020-07-23 DIAGNOSIS — I10 ESSENTIAL HYPERTENSION: ICD-10-CM

## 2020-07-23 PROCEDURE — 99214 OFFICE O/P EST MOD 30 MIN: CPT | Performed by: INTERNAL MEDICINE

## 2020-07-23 ASSESSMENT — FIBROSIS 4 INDEX: FIB4 SCORE: 1.5

## 2020-07-23 NOTE — LETTER
[facility], formerly [facility] [address],   [address]nd [place] [address]  Phone: [phone]  Fax: [phone]              Jacobo Bowling  1945    Encounter Date: 2020    Hayley Barker M.D.          PROGRESS NOTE:  Subjective:   Chief Complaint:   Chief Complaint   Patient presents with   • Aortic Stenosis       Jaocbo Bowling is a 74 y.o. male who returns for mild to mod AS, prior rheumatic heart disease, symptomatic bradycardia, paroxysmal atrial fibrillation fatigue, HTN with LVH.    At age 11, had rheumatic fever, joint pain, could not walk, then heart murmur same time, was in the hospital for weeks. Then murmur went away, was in the [employer] for many years. They did extensive testing, did not find anything wrong.  Our echo 12-19 with mild to mod AS.    Has HTN with mild LVH, no meds yet.  Still smoking, was 8-10 cig per day, smoking since age 15, down to 1-2, just a few puffs.  Has mild HLP,     Noted on ecg to have sinus beatriz.  I was called about an ecg with second degree heart beat post surgery, I actually suspect high grade heart block.  Pacemaker in the office slow A. Fib.  Patient had a [employer] pacemaker placed with Dr. Cantrell on 2020    He is not limited by chest pain, pressure or tightness with activity.   No significant dyspnea on exertion, orthopnea or lower extremity swelling.    Left eye operation, some balance problems.  No symptoms of leg claudication.   No stroke/TIA like symptoms.  His balance feels off a bit after eye surgery.  Has noted from fatigue, slowing down.    He is having some fatigue.  No presyncope/syncope.     No family history of premature coronary artery disease.  Father had CVA around age 67.  Mother  at 62 of breast CA.  No siblings.   No history of diabetes.  No history of autoimmune disease such as lupus or rheumatoid arthritis.  No chronic kidney disease.  Rare  "ETOH.    Put on 12 pounds, planning to lose it.  Likes salt, sun flower seeds with salt.    From [facility], mother Citizen of [place], Father Polish, fought with Citizen of [place] resistance, he was conceived during the blitzkrieg, moved to the [facility] in [place], age 8. Lived in [place], until the [employer] after high school. Retired from [place], [place], moved to [facility], then [place], was too hot, landed here, lived in [place].  Raised 2 daughters in [place] and [place].  One daughter in [place], one in [place]. Son-in-law Principal at [employer].    Wife is Amy (goes by \"Kenneth,\" does not like Amy), not here today, in good health,  44 years. They met in [facility], she is from [place].    DATA REVIEWED by me:  ECG [id number]  Probably afib, 40 though cannot exclude SSS with high grade heart block    ECG 6-  Sinus bradycardia, wenckebach, 41, heart block    ECG 12-17-19  Sinus, 56, first degree AV delay.    Pacemaker placement 7/17/2020 Dr. Cantrell  [employer] device with RA and RV lead    Echo 12-18-19  No prior study is available for comparison.   Left ventricular ejection fraction is visually estimated to be 65%.  Mild concentric left ventricular hypertrophy.  Mildly dilated left atrium.  Calcified aortic valve leaflets, rheumatic appearing.  Mild to moderate aortic stenosis, visually appears moderate: Vmax is    2.9 m/s, MG 20 mmHg, BRE 1.4 cm2.  Trace tricuspid regurgitation.  Estimated right ventricular systolic pressure  is 30 mmHg.     Most recent labs:     Lab Results   Component Value Date/Time    HEMOGLOBIN 12.0 (L) 07/17/2020 07:35 AM    HEMATOCRIT 35.8 (L) 07/17/2020 07:35 AM    .5 (H) 07/17/2020 07:35 AM    INR 1.02 07/17/2020 07:35 AM    TSH 3.320 08/15/2019 04:18 AM      Lab Results   Component Value Date/Time    SODIUM 139 07/17/2020 07:35 AM    POTASSIUM 4.5 07/17/2020 07:35 AM    CHLORIDE 105 07/17/2020 07:35 AM    CO2 22 07/17/2020 07:35 AM    GLUCOSE 97 07/17/2020 07:35 AM    BUN 19 07/17/2020 07:35 " AM    CREATININE 0.77 2020 07:35 AM      Lab Results   Component Value Date/Time    ASTSGOT 16 2020 07:35 AM    ALTSGPT 18 2020 07:35 AM    ALBUMIN 4.0 2020 07:35 AM      Lab Results   Component Value Date/Time    CHOLSTRLTOT 184 08/15/2019 04:18 AM     (H) 08/15/2019 04:18 AM    HDL 43 08/15/2019 04:18 AM    TRIGLYCERIDE 145 08/15/2019 04:18 AM       -15-19 H 14.9, p 141, na 140, k 4.5, cr 1.12, lfts normal, ldl 112, hdl 43, tg 145, tc 184, TSH 3.32    Past Medical History:   Diagnosis Date   • History of chickenpox    • History of measles    • History of mumps    • History of rheumatic fever    • Hypertension      Past Surgical History:   Procedure Laterality Date   • PB RECONSTR TOTAL SHOULDER IMPLANT Right 2020    Procedure: RT ARTHROPLASTY, SHOULDER, TOTAL;  Surgeon: Arsenio Hernandez M.D.;  Location: SURGERY [facility];  Service: Orthopedics   • OTHER      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   • OTHER ORTHOPEDIC SURGERY      bilat RCR     Family History   Problem Relation Age of Onset   • Cancer Mother         Breast,  at 62   • Stroke Father         CVA age 67     Social History     Socioeconomic History   • Marital status:      Spouse name: Not on file   • Number of children: Not on file   • Years of education: Not on file   • Highest education level: Not on file   Occupational History   • Not on file   Social Needs   • Financial resource strain: Not on file   • Food insecurity     Worry: Not on file     Inability: Not on file   • Transportation needs     Medical: Not on file     Non-medical: Not on file   Tobacco Use   • Smoking status: Current Every Day Smoker     Packs/day: 0.30     Types: Cigarettes   • Smokeless tobacco: Never Used   Substance and Sexual Activity   • Alcohol use: Yes     Alcohol/week: 1.8 oz     Types: 1 Cans of beer, 2 Shots of liquor per week     Frequency: 2-3 times a week     Drinks per session: 1 or 2   • Drug use: No   • Sexual  "activity: Not on file   Lifestyle   • Physical activity     Days per week: Not on file     Minutes per session: Not on file   • Stress: Not on file   Relationships   • Social connections     Talks on phone: Not on file     Gets together: Not on file     Attends Mosque service: Not on file     Active member of club or organization: Not on file     Attends meetings of clubs or organizations: Not on file     Relationship status: Not on file   • Intimate partner violence     Fear of current or ex partner: Not on file     Emotionally abused: Not on file     Physically abused: Not on file     Forced sexual activity: Not on file   Other Topics Concern   • Not on file   Social History Narrative   • Not on file     Allergies   Allergen Reactions   • Typhoid Vaccines        Current Outpatient Medications   Medication Sig Dispense Refill   • apixaban (ELIQUIS) 5mg Tab Take 1 Tab by mouth 2 Times a Day. 180 Tab 3   • celecoxib (CELEBREX) 100 MG Cap Take 100 mg by mouth as needed.     • triamcinolone acetonide (KENALOG) 0.1 % Cream      • ciclopirox (LOPROX) 0.77 % cream      • multivitamin (THERAGRAN) Tab Take 1 Tab by mouth every day.     • lisinopril (PRINIVIL) 2.5 MG Tab Take 1 Tab by mouth every day. 90 Tab 3     No current facility-administered medications for this visit.        ROS  All others systems reviewed and negative.     Objective:     /60 (BP Location: Right arm, Patient Position: Sitting)   Pulse 60   Ht 1.803 m (5' 11\")   Wt 92.1 kg (203 lb)   SpO2 94%  Body mass index is 28.31 kg/m².    Physical Exam   General: No acute distress. Well nourished.  HEENT: EOM grossly intact, no scleral icterus, no pharyngeal erythema.   Neck:  No JVD, no bruits, trachea midline  CVS: RRR,3/6 mid peaking sys murmur R, LLSB, No LE edema.  2+ radial pulses, 2+ PT pulses, bandage over PM pocket  Resp: CTAB. No wheezing or crackles/rhonchi. Normal respiratory effort.  Abdomen: Soft, NT, no juliette hepatomegaly.  MSK/Ext: No " clubbing or cyanosis.  Skin: Warm and dry, no rashes. Bruise over right chest  Neurological: CN III-XII grossly intact. No focal deficits. Resting tremor.  Psych: A&O x 3, appropriate affect, good judgement    Physical exam performed today and unchanged, except what is noted, compared to 7-    Assessment:     1. Rheumatic aortic stenosis     2. Pure hypercholesterolemia     3. Essential hypertension     4. Tobacco use     5. LVH (left ventricular hypertrophy)     6. Paroxysmal atrial fibrillation (HCC)     7. Nonrheumatic aortic valve stenosis     8. Other fatigue     9. Chronic anticoagulation     10. Cardiac pacemaker in situ         Medical Decision Making:  Today's Assessment / Status / Plan:     -Pacing but still fatigued, not sleeping, I asked him to check in with PCP  -Probably in afib  -He has HTN, see below, I suspect he will be headed toward meds, he takes in a lot of salt. Goal BP closer to 120/80 with LVH. Will address in the future  -He has very mild HLP, no meds yet  -Tremor, sounds like mostly resting, consider propranolol  -Repeat echo 1 year  AS, , will order next visit  -Needs to quit smoking, he had cut back, wondering if he can stop psychologically completely.  -RTC 1-2 months      Return in about 1 month (around 8/23/2020).    It is my pleasure to participate in the care of Mr. Bowling.  Please do not hesitate to contact me with questions or concerns.    Hayley Barker MD, [facility]  Cardiologist [facility]    Please note that this dictation was created using voice recognition software. I have made every reasonable attempt to correct obvious errors, but it is possible there are errors of grammar and possibly content that I did not discover before finalizing the note.      aHn Cavazos, P.A.-C.  [address] [facility] [address]  [place] [address]  VIA In Basket

## 2020-07-23 NOTE — PROGRESS NOTES
Subjective:   Chief Complaint:   Chief Complaint   Patient presents with   • Aortic Stenosis       Jacobo Bowling is a 74 y.o. male who returns for mild to mod AS, prior rheumatic heart disease, symptomatic bradycardia, paroxysmal atrial fibrillation fatigue, HTN with LVH.    At age 11, had rheumatic fever, joint pain, could not walk, then heart murmur same time, was in the hospital for weeks. Then murmur went away, was in the [employer] for many years. They did extensive testing, did not find anything wrong.  Our echo 12-19 with mild to mod AS.    Has HTN with mild LVH, no meds yet.  Still smoking, was 8-10 cig per day, smoking since age 15, down to 1-2, just a few puffs.  Has mild HLP,     Noted on ecg to have sinus beatriz.  I was called about an ecg with second degree heart beat post surgery, I actually suspect high grade heart block.  Pacemaker in the office slow A. Fib.  Patient had a [employer] pacemaker placed with Dr. Cantrell on 2020    He is not limited by chest pain, pressure or tightness with activity.   No significant dyspnea on exertion, orthopnea or lower extremity swelling.    Left eye operation, some balance problems.  No symptoms of leg claudication.   No stroke/TIA like symptoms.  His balance feels off a bit after eye surgery.  Has noted from fatigue, slowing down.    He is having some fatigue.  No presyncope/syncope.     No family history of premature coronary artery disease.  Father had CVA around age 67.  Mother  at 62 of breast CA.  No siblings.   No history of diabetes.  No history of autoimmune disease such as lupus or rheumatoid arthritis.  No chronic kidney disease.  Rare ETOH.    Put on 12 pounds, planning to lose it.  Likes salt, sun flower seeds with salt.    From [facility], mother [place], Father [place], fought with Pakistani resistance, he was conceived during the , moved to the [facility] in [place], age 8. Lived in [place], until the [employer] after high school. Retired from  "[place], [place], moved to [facility], then [place], was too hot, landed here, lived in [place].  Raised 2 daughters in [place] and [place].  One daughter in [place], one in [place]. Son-in-law Principal at [employer].    Wife is Amy (goes by \"Kenneth,\" does not like Amy), not here today, in good health,  44 years. They met in [facility], she is from [place].    DATA REVIEWED by me:  ECG [id number]  Probably afib, 40 though cannot exclude SSS with high grade heart block    ECG 6-  Sinus bradycardia, wenckebach, 41, heart block    ECG 12-17-19  Sinus, 56, first degree AV delay.    Pacemaker placement 7/17/2020 Dr. Cantrell  That{img} device with RA and RV lead    Echo 12-18-19  No prior study is available for comparison.   Left ventricular ejection fraction is visually estimated to be 65%.  Mild concentric left ventricular hypertrophy.  Mildly dilated left atrium.  Calcified aortic valve leaflets, rheumatic appearing.  Mild to moderate aortic stenosis, visually appears moderate: Vmax is    2.9 m/s, MG 20 mmHg, BRE 1.4 cm2.  Trace tricuspid regurgitation.  Estimated right ventricular systolic pressure  is 30 mmHg.     Most recent labs:     Lab Results   Component Value Date/Time    HEMOGLOBIN 12.0 (L) 07/17/2020 07:35 AM    HEMATOCRIT 35.8 (L) 07/17/2020 07:35 AM    .5 (H) 07/17/2020 07:35 AM    INR 1.02 07/17/2020 07:35 AM    TSH 3.320 08/15/2019 04:18 AM      Lab Results   Component Value Date/Time    SODIUM 139 07/17/2020 07:35 AM    POTASSIUM 4.5 07/17/2020 07:35 AM    CHLORIDE 105 07/17/2020 07:35 AM    CO2 22 07/17/2020 07:35 AM    GLUCOSE 97 07/17/2020 07:35 AM    BUN 19 07/17/2020 07:35 AM    CREATININE 0.77 07/17/2020 07:35 AM      Lab Results   Component Value Date/Time    ASTSGOT 16 07/17/2020 07:35 AM    ALTSGPT 18 07/17/2020 07:35 AM    ALBUMIN 4.0 07/17/2020 07:35 AM      Lab Results   Component Value Date/Time    CHOLSTRLTOT 184 08/15/2019 04:18 AM     (H) 08/15/2019 04:18 AM    " HDL 43 08/15/2019 04:18 AM    TRIGLYCERIDE 145 08/15/2019 04:18 AM       8-15-19 H 14.9, p 141, na 140, k 4.5, cr 1.12, lfts normal, ldl 112, hdl 43, tg 145, tc 184, TSH 3.32    Past Medical History:   Diagnosis Date   • History of chickenpox    • History of measles    • History of mumps    • History of rheumatic fever    • Hypertension      Past Surgical History:   Procedure Laterality Date   • PB RECONSTR TOTAL SHOULDER IMPLANT Right 2020    Procedure: RT ARTHROPLASTY, SHOULDER, TOTAL;  Surgeon: Arsenio Hernandez M.D.;  Location: SURGERY [facility];  Service: Orthopedics   • OTHER      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   • OTHER ORTHOPEDIC SURGERY      bilat RCR     Family History   Problem Relation Age of Onset   • Cancer Mother         Breast,  at 62   • Stroke Father         CVA age 67     Social History     Socioeconomic History   • Marital status:      Spouse name: Not on file   • Number of children: Not on file   • Years of education: Not on file   • Highest education level: Not on file   Occupational History   • Not on file   Social Needs   • Financial resource strain: Not on file   • Food insecurity     Worry: Not on file     Inability: Not on file   • Transportation needs     Medical: Not on file     Non-medical: Not on file   Tobacco Use   • Smoking status: Current Every Day Smoker     Packs/day: 0.30     Types: Cigarettes   • Smokeless tobacco: Never Used   Substance and Sexual Activity   • Alcohol use: Yes     Alcohol/week: 1.8 oz     Types: 1 Cans of beer, 2 Shots of liquor per week     Frequency: 2-3 times a week     Drinks per session: 1 or 2   • Drug use: No   • Sexual activity: Not on file   Lifestyle   • Physical activity     Days per week: Not on file     Minutes per session: Not on file   • Stress: Not on file   Relationships   • Social connections     Talks on phone: Not on file     Gets together: Not on file     Attends Jainism service: Not on file     Active member of club  "or organization: Not on file     Attends meetings of clubs or organizations: Not on file     Relationship status: Not on file   • Intimate partner violence     Fear of current or ex partner: Not on file     Emotionally abused: Not on file     Physically abused: Not on file     Forced sexual activity: Not on file   Other Topics Concern   • Not on file   Social History Narrative   • Not on file     Allergies   Allergen Reactions   • Typhoid Vaccines        Current Outpatient Medications   Medication Sig Dispense Refill   • apixaban (ELIQUIS) 5mg Tab Take 1 Tab by mouth 2 Times a Day. 180 Tab 3   • celecoxib (CELEBREX) 100 MG Cap Take 100 mg by mouth as needed.     • triamcinolone acetonide (KENALOG) 0.1 % Cream      • ciclopirox (LOPROX) 0.77 % cream      • multivitamin (THERAGRAN) Tab Take 1 Tab by mouth every day.     • lisinopril (PRINIVIL) 2.5 MG Tab Take 1 Tab by mouth every day. 90 Tab 3     No current facility-administered medications for this visit.        ROS  All others systems reviewed and negative.     Objective:     /60 (BP Location: Right arm, Patient Position: Sitting)   Pulse 60   Ht 1.803 m (5' 11\")   Wt 92.1 kg (203 lb)   SpO2 94%  Body mass index is 28.31 kg/m².    Physical Exam   General: No acute distress. Well nourished.  HEENT: EOM grossly intact, no scleral icterus, no pharyngeal erythema.   Neck:  No JVD, no bruits, trachea midline  CVS: RRR,3/6 mid peaking sys murmur R, LLSB, No LE edema.  2+ radial pulses, 2+ PT pulses, bandage over PM pocket  Resp: CTAB. No wheezing or crackles/rhonchi. Normal respiratory effort.  Abdomen: Soft, NT, no juliette hepatomegaly.  MSK/Ext: No clubbing or cyanosis.  Skin: Warm and dry, no rashes. Bruise over right chest  Neurological: CN III-XII grossly intact. No focal deficits. Resting tremor.  Psych: A&O x 3, appropriate affect, good judgement    Physical exam performed today and unchanged, except what is noted, compared to 7-    Assessment: "     1. Rheumatic aortic stenosis     2. Pure hypercholesterolemia     3. Essential hypertension     4. Tobacco use     5. LVH (left ventricular hypertrophy)     6. Paroxysmal atrial fibrillation (HCC)     7. Nonrheumatic aortic valve stenosis     8. Other fatigue     9. Chronic anticoagulation     10. Cardiac pacemaker in situ         Medical Decision Making:  Today's Assessment / Status / Plan:     -Pacing but still fatigued, not sleeping, I asked him to check in with PCP  -Probably in afib  -He has HTN, see below, I suspect he will be headed toward meds, he takes in a lot of salt. Goal BP closer to 120/80 with LVH. Will address in the future  -He has very mild HLP, no meds yet  -Tremor, sounds like mostly resting, consider propranolol  -Repeat echo 1 year  AS, , will order next visit  -Needs to quit smoking, he had cut back, wondering if he can stop psychologically completely.  -RTC 1-2 months      Return in about 1 month (around 8/23/2020).    It is my pleasure to participate in the care of Mr. Bowling.  Please do not hesitate to contact me with questions or concerns.    Hayley Barker MD, [facility]  Cardiologist [facility]    Please note that this dictation was created using voice recognition software. I have made every reasonable attempt to correct obvious errors, but it is possible there are errors of grammar and possibly content that I did not discover before finalizing the note.

## 2020-07-29 ENCOUNTER — NON-PROVIDER VISIT (OUTPATIENT)
Dept: CARDIOLOGY | Facility: PHYSICIAN GROUP | Age: 75
End: 2020-07-29
Payer: MEDICARE

## 2020-07-29 VITALS
OXYGEN SATURATION: 93 % | BODY MASS INDEX: 27.92 KG/M2 | DIASTOLIC BLOOD PRESSURE: 62 MMHG | HEART RATE: 60 BPM | SYSTOLIC BLOOD PRESSURE: 120 MMHG | WEIGHT: 195 LBS | HEIGHT: 70 IN

## 2020-07-29 DIAGNOSIS — Z95.0 CARDIAC PACEMAKER IN SITU: ICD-10-CM

## 2020-07-29 DIAGNOSIS — I48.0 PAROXYSMAL ATRIAL FIBRILLATION (HCC): ICD-10-CM

## 2020-07-29 DIAGNOSIS — I44.2 AV BLOCK, COMPLETE (HCC): ICD-10-CM

## 2020-07-29 DIAGNOSIS — Z79.01 CHRONIC ANTICOAGULATION: ICD-10-CM

## 2020-07-29 PROCEDURE — 93280 PM DEVICE PROGR EVAL DUAL: CPT | Performed by: NURSE PRACTITIONER

## 2020-07-29 ASSESSMENT — FIBROSIS 4 INDEX: FIB4 SCORE: 1.5

## 2020-07-29 NOTE — PROGRESS NOTES
PM was implanted on 7/17/2020 for sick sinus syndrome/high AV block. He is here today for wound check.    Device is working normally. 1 mode switching episode lasting 1 minute.  Normal sensing of RA lead; unable to measure R waves. Stable capture of RA and RV leads; stable impedances. Battery longevity is 7.5 years.  No changes are made today.    Incision is well healed with no redness, swelling or drainage. He is reminded about limited ROM of left arm; he is also given wound care instructions, general device information and anticipatory guidance.    FU in 1 months for next PM check with me.    Collaborating MD: Mj

## 2020-08-11 ENCOUNTER — TELEPHONE (OUTPATIENT)
Dept: CARDIOLOGY | Facility: MEDICAL CENTER | Age: 75
End: 2020-08-11

## 2020-08-11 NOTE — TELEPHONE ENCOUNTER
Geovanna,    This patient does not need antibiotics for dental work.  The patient takes apixaban and she remain on apixaban for dental procedures.  Okay for all anesthetics.    Please fill out the form and sign out by her half and fax back.    Thank you

## 2020-08-11 NOTE — TELEPHONE ENCOUNTER
LS/ Clearance scanned in media      Rep Farida Alberto from Dr. Chavez office calling in regards to a surgical clearance form she faxed over. Patient does have an appt with Dr.. Chavez tomorrow morning. She will like clearance faxed as soon as possible. If you have any questions she can be reached at [phone].      Thank you!

## 2020-08-19 ENCOUNTER — NON-PROVIDER VISIT (OUTPATIENT)
Dept: CARDIOLOGY | Facility: PHYSICIAN GROUP | Age: 75
End: 2020-08-19
Payer: MEDICARE

## 2020-08-19 VITALS
HEIGHT: 70 IN | HEART RATE: 62 BPM | DIASTOLIC BLOOD PRESSURE: 80 MMHG | OXYGEN SATURATION: 92 % | SYSTOLIC BLOOD PRESSURE: 148 MMHG | BODY MASS INDEX: 28.35 KG/M2 | WEIGHT: 198 LBS

## 2020-08-19 DIAGNOSIS — I44.2 AV BLOCK, COMPLETE (HCC): ICD-10-CM

## 2020-08-19 DIAGNOSIS — I48.0 PAROXYSMAL ATRIAL FIBRILLATION (HCC): ICD-10-CM

## 2020-08-19 DIAGNOSIS — Z95.0 CARDIAC PACEMAKER IN SITU: ICD-10-CM

## 2020-08-19 PROCEDURE — 93280 PM DEVICE PROGR EVAL DUAL: CPT | Performed by: NURSE PRACTITIONER

## 2020-08-19 ASSESSMENT — FIBROSIS 4 INDEX: FIB4 SCORE: 1.5

## 2020-08-19 NOTE — PROGRESS NOTES
Device is working normally. No mode switching episodes.  Normal sensing of RA lead; unable to measure R waves. Stable capture of RA and RV leads; stable impedances. Battery longevity is 8 years.      Changes today include decreasing RA and RV outputs.    FU in 3 months for next PM check with me.    Collaborating MD: Mj

## 2020-08-27 ENCOUNTER — OFFICE VISIT (OUTPATIENT)
Dept: CARDIOLOGY | Facility: CLINIC | Age: 75
End: 2020-08-27
Payer: MEDICARE

## 2020-08-27 VITALS
HEART RATE: 66 BPM | SYSTOLIC BLOOD PRESSURE: 148 MMHG | DIASTOLIC BLOOD PRESSURE: 88 MMHG | BODY MASS INDEX: 28.92 KG/M2 | OXYGEN SATURATION: 93 % | HEIGHT: 70 IN | WEIGHT: 202 LBS

## 2020-08-27 DIAGNOSIS — I10 ESSENTIAL HYPERTENSION: ICD-10-CM

## 2020-08-27 DIAGNOSIS — Z79.01 CHRONIC ANTICOAGULATION: ICD-10-CM

## 2020-08-27 DIAGNOSIS — I48.0 PAROXYSMAL ATRIAL FIBRILLATION (HCC): ICD-10-CM

## 2020-08-27 DIAGNOSIS — Z95.0 CARDIAC PACEMAKER IN SITU: ICD-10-CM

## 2020-08-27 DIAGNOSIS — I06.0 RHEUMATIC AORTIC STENOSIS: ICD-10-CM

## 2020-08-27 DIAGNOSIS — G47.33 OSA (OBSTRUCTIVE SLEEP APNEA): ICD-10-CM

## 2020-08-27 DIAGNOSIS — I44.2 AV BLOCK, COMPLETE (HCC): ICD-10-CM

## 2020-08-27 DIAGNOSIS — Z72.0 TOBACCO USE: ICD-10-CM

## 2020-08-27 DIAGNOSIS — I51.7 LVH (LEFT VENTRICULAR HYPERTROPHY): ICD-10-CM

## 2020-08-27 DIAGNOSIS — E78.00 PURE HYPERCHOLESTEROLEMIA: ICD-10-CM

## 2020-08-27 DIAGNOSIS — R53.83 OTHER FATIGUE: ICD-10-CM

## 2020-08-27 PROCEDURE — 99214 OFFICE O/P EST MOD 30 MIN: CPT | Performed by: INTERNAL MEDICINE

## 2020-08-27 ASSESSMENT — FIBROSIS 4 INDEX: FIB4 SCORE: 1.5

## 2020-08-27 NOTE — PROGRESS NOTES
Subjective:   Chief Complaint:   Chief Complaint   Patient presents with   • HTN (Controlled)       Jacobo Bowling is a 74 y.o. male who returns for mild to mod AS, prior rheumatic heart disease, symptomatic bradycardia, paroxysmal atrial fibrillation fatigue, HTN with LVH.    At age 11, had rheumatic fever, joint pain, could not walk, then heart murmur same time, was in the hospital for weeks. Then murmur went away, was in the [employer] for many years. They did extensive testing, did not find anything wrong.  Our echo 12-19 with mild to mod AS.    Has HTN with mild LVH, no meds yet.    Also has untreated FRANCHESCA, was given mask but didn't feel he could wear it.    Still smoking, was 8-10 cig per day, smoking since age 15, down to 1-2, just a few puffs. Still working it, 3-4 per day.    Has mild HLP, .    Patient had a [employer] pacemaker placed with Dr. Cantrell on 2020 for symptomatic slow Afib, occasional mode switches but getting paced a lot.  Does not feel different.    He is not limited by chest pain, pressure or tightness with activity.   No significant dyspnea on exertion, orthopnea or lower extremity swelling.    Left eye operation, some balance problems.  No symptoms of leg claudication.   No stroke/TIA like symptoms.  His balance feels off a bit after eye surgery.  Has noted from fatigue, slowing down.    He is having some fatigue.  No presyncope/syncope.     No family history of premature coronary artery disease.  Father had CVA around age 67.  Mother  at 62 of breast CA.  No siblings.   No history of diabetes.  No history of autoimmune disease such as lupus or rheumatoid arthritis.  No chronic kidney disease.  Rare ETOH.    Put on 12 pounds, planning to lose it.  Likes salt, sun flower seeds with salt.    Still struggling to sleep after right shoulder surgery.    From [facility], mother [place], Father [place], fought with Vietnamese resistance, he was conceived during the , [place] 8. Lived in [place], until the [place] after high school. Retired from [employer], [place], moved to [facility], then [place], was too hot, landed here, lived in [place].  Raised 2 daughters in [place] and [place].  One daughter in [place], one in [place]. Son-in-law Principal at [employer].    Wife is Amy (goes by \"Kenneth,\" does not like Amy), not here today, in good health,  44 years. They met in [facility], she is from [place].    DATA REVIEWED by me:  ECG [id number]  Probably afib, 40 though cannot exclude SSS with high grade heart block    ECG 6-  Sinus bradycardia, webin, 41, heart block    ECG 12-17-19  Sinus, 56, first degree AV delay.    PM 8-  A paced 60%, V 98%, no mode switch    Pacemaker placement 7/17/2020 Dr. Cantrell  Extole device with RA and RV lead    Echo 12-18-19  No prior study is available for comparison.   Left ventricular ejection fraction is visually estimated to be 65%.  Mild concentric left ventricular hypertrophy.  Mildly dilated left atrium.  Calcified aortic valve leaflets, rheumatic appearing.  Mild to moderate aortic stenosis, visually appears moderate: Vmax is    2.9 m/s, MG 20 mmHg, BRE 1.4 cm2.  Trace tricuspid regurgitation.  Estimated right ventricular systolic pressure  is 30 mmHg.     Most recent labs:     Lab Results   Component Value Date/Time    HEMOGLOBIN 12.0 (L) 07/17/2020 07:35 AM    HEMATOCRIT 35.8 (L) 07/17/2020 07:35 AM    .5 (H) 07/17/2020 07:35 AM    INR 1.02 07/17/2020 07:35 AM    TSH 3.320 08/15/2019 04:18 AM      Lab Results   Component Value Date/Time    SODIUM 139 07/17/2020 07:35 AM    POTASSIUM 4.5 07/17/2020 07:35 AM    CHLORIDE 105 07/17/2020 07:35 AM    CO2 22 07/17/2020 07:35 AM    GLUCOSE 97 07/17/2020 07:35 AM    BUN 19 07/17/2020 07:35 AM    CREATININE 0.77 07/17/2020 07:35 AM      Lab Results   Component Value Date/Time    ASTSGOT 16 07/17/2020 07:35 AM    ALTSGPT 18 07/17/2020 07:35 AM    ALBUMIN 4.0 " 2020 07:35 AM      Lab Results   Component Value Date/Time    CHOLSTRLTOT 184 08/15/2019 04:18 AM     (H) 08/15/2019 04:18 AM    HDL 43 08/15/2019 04:18 AM    TRIGLYCERIDE 145 08/15/2019 04:18 AM       8-15-19 H 14.9, p 141, na 140, k 4.5, cr 1.12, lfts normal, ldl 112, hdl 43, tg 145, tc 184, TSH 3.32    Past Medical History:   Diagnosis Date   • AV block, complete (HCC) 2020    Status post pacemaker placement   • History of chickenpox    • History of measles    • History of mumps    • History of rheumatic fever    • Hypertension    • Paroxysmal atrial fibrillation (HCC)      Past Surgical History:   Procedure Laterality Date   • PACEMAKER INSERTION Left 2020    [employer] Accolade MRI L311 implanted by Dr. Cantrell.   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   • OTHER ORTHOPEDIC SURGERY      bilat RCR     Family History   Problem Relation Age of Onset   • Cancer Mother         Breast,  at 62   • Stroke Father         CVA age 67     Social History     Socioeconomic History   • Marital status:      Spouse name: Not on file   • Number of children: Not on file   • Years of education: Not on file   • Highest education level: Not on file   Occupational History   • Not on file   Social Needs   • Financial resource strain: Not on file   • Food insecurity     Worry: Not on file     Inability: Not on file   • Transportation needs     Medical: Not on file     Non-medical: Not on file   Tobacco Use   • Smoking status: Current Some Day Smoker     Packs/day: 0.30     Types: Cigarettes   • Smokeless tobacco: Never Used   Substance and Sexual Activity   • Alcohol use: Yes     Alcohol/week: 1.8 oz     Types: 1 Cans of beer, 2 Shots of liquor per week     Frequency: 2-3 times a week     Drinks per  "session: 1 or 2   • Drug use: No   • Sexual activity: Not on file   Lifestyle   • Physical activity     Days per week: Not on file     Minutes per session: Not on file   • Stress: Not on file   Relationships   • Social connections     Talks on phone: Not on file     Gets together: Not on file     Attends Sikhism service: Not on file     Active member of club or organization: Not on file     Attends meetings of clubs or organizations: Not on file     Relationship status: Not on file   • Intimate partner violence     Fear of current or ex partner: Not on file     Emotionally abused: Not on file     Physically abused: Not on file     Forced sexual activity: Not on file   Other Topics Concern   • Not on file   Social History Narrative   • Not on file     Allergies   Allergen Reactions   • Typhoid Vaccines        Current Outpatient Medications   Medication Sig Dispense Refill   • tramadol (ULTRAM) 50 MG Tab Take 1 Tab by mouth at bedtime as needed for up to 30 days. 30 Tab 0   • apixaban (ELIQUIS) 5mg Tab Take 1 Tab by mouth 2 Times a Day. 180 Tab 3   • triamcinolone acetonide (KENALOG) 0.1 % Cream      • ciclopirox (LOPROX) 0.77 % cream      • multivitamin (THERAGRAN) Tab Take 1 Tab by mouth every day.     • lisinopril (PRINIVIL) 2.5 MG Tab Take 1 Tab by mouth every day. 90 Tab 3   • zolpidem (AMBIEN) 5 MG Tab Take 1 tablet at bedtime (Patient not taking: Reported on 8/27/2020) 30 Tab 1     No current facility-administered medications for this visit.        ROS    All others systems reviewed and negative.     Objective:     /88 (BP Location: Left arm, Patient Position: Sitting)   Pulse 66   Ht 1.778 m (5' 10\")   Wt 91.6 kg (202 lb)   SpO2 93%  Body mass index is 28.98 kg/m².    Physical Exam   General: No acute distress. Well nourished.  HEENT: EOM grossly intact, no scleral icterus, no pharyngeal erythema.   Neck:  No JVD, no bruits, trachea midline  CVS: RRR,3/6 mid peaking sys murmur R, LLSB, No LE edema. "  2+ radial pulses, 2+ PT pulses, PM pocket in tact  Resp: CTAB. No wheezing or crackles/rhonchi. Normal respiratory effort.  Abdomen: Soft, NT, no juliette hepatomegaly.  MSK/Ext: No clubbing or cyanosis.  Skin: Warm and dry, no rashes. Bruise over right chest  Neurological: CN III-XII grossly intact. No focal deficits. Resting tremor.  Psych: A&O x 3, appropriate affect, good judgement    Physical exam performed today and unchanged, except what is noted, compared to 7-    Assessment:     1. Cardiac pacemaker in situ     2. AV block, complete (HCC)     3. Paroxysmal atrial fibrillation (HCC)     4. Chronic anticoagulation     5. Rheumatic aortic stenosis     6. Pure hypercholesterolemia     7. Essential hypertension     8. Tobacco use     9. LVH (left ventricular hypertrophy)     10. Other fatigue     11. FRANCHESCA (obstructive sleep apnea)         Medical Decision Making:  Today's Assessment / Status / Plan:     -Pacing but still fatigued, not sleeping, I asked him to check in with PCP  -PAF, [id number] of 3, no prior TIA/CVA.  -He has HTN, BP still elevated, working on sleep first.  -Untreated FRANCHESCA, didn't like CPAP.  -He has very mild HLP, no meds yet  -Tremor, sounds like mostly resting, consider propranolol, he would like to try better sleep first.  -Repeat echo 1 year  AS, , will order next visit  -Needs to quit smoking, he had cut back, wondering if he can stop psychologically completely.  -RTC 4-6 weeks    Written instructions given today:  -Zolpidem is ambien, max dose is 10 mg, you were given 5 mg, can cut it in half and start at 2.5 mg. Make sure you have a good 8-10 hours of uninterrupted sleep time. Plan to do not much the day until you know how it affects you.      Return in about 4 weeks (around 9/24/2020).    It is my pleasure to participate in the care of Mr. Bowling.  Please do not hesitate to contact me with questions or concerns.    Hayley Barker MD, [facility]  Cardiologist [facility]    Please note that this dictation was created using voice recognition software. I have made every reasonable attempt to correct obvious errors, but it is possible there are errors of grammar and possibly content that I did not discover before finalizing the note.

## 2020-08-27 NOTE — PATIENT INSTRUCTIONS
-Zolpidem is ambien, max dose is 10 mg, you were given 5 mg, can cut it in half and start at 2.5 mg. Make sure you have a good 8-10 hours of uninterrupted sleep time. Plan to do not much the day until you know how it affects you.

## 2020-08-27 NOTE — LETTER
[facility] [address],   [address]nd [place] [address]  Phone: [phone]  Fax: [phone]              Jacobo Bowling  1945    Encounter Date: 2020    Hayley Barker M.D.          PROGRESS NOTE:  Subjective:   Chief Complaint:   Chief Complaint   Patient presents with   • HTN (Controlled)       Jacobo Bowling is a 74 y.o. male who returns for mild to mod AS, prior rheumatic heart disease, symptomatic bradycardia, paroxysmal atrial fibrillation fatigue, HTN with LVH.    At age 11, had rheumatic fever, joint pain, could not walk, then heart murmur same time, was in the hospital for weeks. Then murmur went away, was in the [employer] for many years. They did extensive testing, did not find anything wrong.  Our echo 12-19 with mild to mod AS.    Has HTN with mild LVH, no meds yet.    Also has untreated FRANCHESCA, was given mask but didn't feel he could wear it.    Still smoking, was 8-10 cig per day, smoking since age 15, down to 1-2, just a few puffs. Still working it, 3-4 per day.    Has mild HLP, .    Patient had a [employer] pacemaker placed with Dr. Cantrell on 2020 for symptomatic slow Afib, occasional mode switches but getting paced a lot.  Does not feel different.    He is not limited by chest pain, pressure or tightness with activity.   No significant dyspnea on exertion, orthopnea or lower extremity swelling.    Left eye operation, some balance problems.  No symptoms of leg claudication.   No stroke/TIA like symptoms.  His balance feels off a bit after eye surgery.  Has noted from fatigue, slowing down.    He is having some fatigue.  No presyncope/syncope.     No family history of premature coronary artery disease.  Father had CVA around age 67.  Mother  at 62 of breast CA.  No siblings.   No history of diabetes.  No history of autoimmune disease such as lupus or rheumatoid arthritis.  No chronic  "kidney disease.  Rare ETOH.    Put on 12 pounds, planning to lose it.  Likes salt, sun flower seeds with salt.    Still struggling to sleep after right shoulder surgery.    From [place], mother [place], Father [place], fought with Wol[place] resistance, he was conceived during the [place], [place] age 8. Lived in [place], until the [place] after high school. Retired from [place], [place], moved to [facility], then [place], was too hot, landed here, lived in [place].  Raised 2 daughters in [place] and [place].  One daughter in [place], one in [place]. Son-in-law Principal at [employer].    Wife is Amy (goes by \"Kenneth,\" does not like Amy), not here today, in good health,  44 years. They met in [facility], she is from [place].    DATA REVIEWED by me:  ECG [id number]  Probably afib, 40 though cannot exclude SSS with high grade heart block    ECG 6-  Sinus bradycardia, wenckebach, 41, heart block    ECG 12-17-19  Sinus, 56, first degree AV delay.    PM 8-  A paced 60%, V 98%, no mode switch    Pacemaker placement 7/17/2020 Dr. Cantrell  [employer] device with RA and RV lead    Echo 12-18-19  No prior study is available for comparison.   Left ventricular ejection fraction is visually estimated to be 65%.  Mild concentric left ventricular hypertrophy.  Mildly dilated left atrium.  Calcified aortic valve leaflets, rheumatic appearing.  Mild to moderate aortic stenosis, visually appears moderate: Vmax is    2.9 m/s, MG 20 mmHg, BRE 1.4 cm2.  Trace tricuspid regurgitation.  Estimated right ventricular systolic pressure  is 30 mmHg.     Most recent labs:     Lab Results   Component Value Date/Time    HEMOGLOBIN 12.0 (L) 07/17/2020 07:35 AM    HEMATOCRIT 35.8 (L) 07/17/2020 07:35 AM    .5 (H) 07/17/2020 07:35 AM    INR 1.02 07/17/2020 07:35 AM    TSH 3.320 08/15/2019 04:18 AM      Lab Results   Component Value Date/Time    SODIUM 139 07/17/2020 07:35 AM    POTASSIUM 4.5 07/17/2020 " 07:35 AM    CHLORIDE 105 2020 07:35 AM    CO2 22 2020 07:35 AM    GLUCOSE 97 2020 07:35 AM    BUN 19 2020 07:35 AM    CREATININE 0.77 2020 07:35 AM      Lab Results   Component Value Date/Time    ASTSGOT 16 2020 07:35 AM    ALTSGPT 18 2020 07:35 AM    ALBUMIN 4.0 2020 07:35 AM      Lab Results   Component Value Date/Time    CHOLSTRLTOT 184 08/15/2019 04:18 AM     (H) 08/15/2019 04:18 AM    HDL 43 08/15/2019 04:18 AM    TRIGLYCERIDE 145 08/15/2019 04:18 AM       8-15-19 H 14.9, p 141, na 140, k 4.5, cr 1.12, lfts normal, ldl 112, hdl 43, tg 145, tc 184, TSH 3.32    Past Medical History:   Diagnosis Date   • AV block, complete (HCC) 2020    Status post pacemaker placement   • History of chickenpox    • History of measles    • History of mumps    • History of rheumatic fever    • Hypertension    • Paroxysmal atrial fibrillation (HCC)      Past Surgical History:   Procedure Laterality Date   • PACEMAKER INSERTION Left 2020    [employer] Accolade MRI L311 implanted by Dr. Cantrell.   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   • OTHER ORTHOPEDIC SURGERY      bilat RCR     Family History   Problem Relation Age of Onset   • Cancer Mother         Breast,  at 62   • Stroke Father         CVA age 67     Social History     Socioeconomic History   • Marital status:      Spouse name: Not on file   • Number of children: Not on file   • Years of education: Not on file   • Highest education level: Not on file   Occupational History   • Not on file   Social Needs   • Financial resource strain: Not on file   • Food insecurity     Worry: Not on file     Inability: Not on file   • Transportation needs     Medical: Not on file     Non-medical: Not on file   Tobacco Use  "  • Smoking status: Current Some Day Smoker     Packs/day: 0.30     Types: Cigarettes   • Smokeless tobacco: Never Used   Substance and Sexual Activity   • Alcohol use: Yes     Alcohol/week: 1.8 oz     Types: 1 Cans of beer, 2 Shots of liquor per week     Frequency: 2-3 times a week     Drinks per session: 1 or 2   • Drug use: No   • Sexual activity: Not on file   Lifestyle   • Physical activity     Days per week: Not on file     Minutes per session: Not on file   • Stress: Not on file   Relationships   • Social connections     Talks on phone: Not on file     Gets together: Not on file     Attends Buddhist service: Not on file     Active member of club or organization: Not on file     Attends meetings of clubs or organizations: Not on file     Relationship status: Not on file   • Intimate partner violence     Fear of current or ex partner: Not on file     Emotionally abused: Not on file     Physically abused: Not on file     Forced sexual activity: Not on file   Other Topics Concern   • Not on file   Social History Narrative   • Not on file     Allergies   Allergen Reactions   • Typhoid Vaccines        Current Outpatient Medications   Medication Sig Dispense Refill   • tramadol (ULTRAM) 50 MG Tab Take 1 Tab by mouth at bedtime as needed for up to 30 days. 30 Tab 0   • apixaban (ELIQUIS) 5mg Tab Take 1 Tab by mouth 2 Times a Day. 180 Tab 3   • triamcinolone acetonide (KENALOG) 0.1 % Cream      • ciclopirox (LOPROX) 0.77 % cream      • multivitamin (THERAGRAN) Tab Take 1 Tab by mouth every day.     • lisinopril (PRINIVIL) 2.5 MG Tab Take 1 Tab by mouth every day. 90 Tab 3   • zolpidem (AMBIEN) 5 MG Tab Take 1 tablet at bedtime (Patient not taking: Reported on 8/27/2020) 30 Tab 1     No current facility-administered medications for this visit.        ROS    All others systems reviewed and negative.     Objective:     /88 (BP Location: Left arm, Patient Position: Sitting)   Pulse 66   Ht 1.778 m (5' 10\")   Wt " 91.6 kg (202 lb)   SpO2 93%  Body mass index is 28.98 kg/m².    Physical Exam   General: No acute distress. Well nourished.  HEENT: EOM grossly intact, no scleral icterus, no pharyngeal erythema.   Neck:  No JVD, no bruits, trachea midline  CVS: RRR,3/6 mid peaking sys murmur R, LLSB, No LE edema.  2+ radial pulses, 2+ PT pulses, PM pocket in tact  Resp: CTAB. No wheezing or crackles/rhonchi. Normal respiratory effort.  Abdomen: Soft, NT, no juliette hepatomegaly.  MSK/Ext: No clubbing or cyanosis.  Skin: Warm and dry, no rashes. Bruise over right chest  Neurological: CN III-XII grossly intact. No focal deficits. Resting tremor.  Psych: A&O x 3, appropriate affect, good judgement    Physical exam performed today and unchanged, except what is noted, compared to 7-    Assessment:     1. Cardiac pacemaker in situ     2. AV block, complete (HCC)     3. Paroxysmal atrial fibrillation (HCC)     4. Chronic anticoagulation     5. Rheumatic aortic stenosis     6. Pure hypercholesterolemia     7. Essential hypertension     8. Tobacco use     9. LVH (left ventricular hypertrophy)     10. Other fatigue     11. FRANCHESCA (obstructive sleep apnea)         Medical Decision Making:  Today's Assessment / Status / Plan:     -Pacing but still fatigued, not sleeping, I asked him to check in with PCP  -PAF, [id number] of 3, no prior TIA/CVA.  -He has HTN, BP still elevated, working on sleep first.  -Untreated FRANCHESCA, didn't like CPAP.  -He has very mild HLP, no meds yet  -Tremor, sounds like mostly resting, consider propranolol, he would like to try better sleep first.  -Repeat echo 1 year  AS, , will order next visit  -Needs to quit smoking, he had cut back, wondering if he can stop psychologically completely.  -RTC 4-6 weeks    Written instructions given today:  -Zolpidem is ambien, max dose is 10 mg, you were given 5 mg, can cut it in half and start at 2.5 mg. Make sure you have a good 8-10 hours of uninterrupted sleep time. Plan  to do not much the day until you know how it affects you.      Return in about 4 weeks (around 9/24/2020).    It is my pleasure to participate in the care of Mr. Bowling.  Please do not hesitate to contact me with questions or concerns.    Hayley Barker MD, [facility]  Cardiologist [facility]    Please note that this dictation was created using voice recognition software. I have made every reasonable attempt to correct obvious errors, but it is possible there are errors of grammar and possibly content that I did not discover before finalizing the note.      Han Cavazos, P.A.-C.  [address]  [place] [address]  Via In Basket

## 2020-08-31 DIAGNOSIS — Z79.01 CHRONIC ANTICOAGULATION: ICD-10-CM

## 2020-10-13 NOTE — PROGRESS NOTES
Subjective:   Chief Complaint:   Chief Complaint   Patient presents with   • Aortic Stenosis     Jacobo Bowling is a 75 y.o. male who returns for mild to mod AS, prior rheumatic heart disease, symptomatic bradycardia, paroxysmal atrial fibrillation fatigue, HTN with LVH.    At age 11, had rheumatic fever, joint pain, could not walk, then heart murmur same time, was in the hospital for weeks. Then murmur went away, was in the [employer] for many years. They did extensive testing, did not find anything wrong.  Our echo 12-19 with mild to mod AS.    Has HTN with mild LVH, on low dose meds, doing better, BP at home cuff is elevated compared to ours.    Also has untreated FRANCHESCA, was given mask but didn't feel he could wear it.    Still smoking, was 8-10 cig per day, smoking since age 15, down to 1-2, just a few puffs. Still working it, 3-4 per day. Not yet ready to quit.    Has mild HLP, .    Patient had a [employer] pacemaker placed with Dr. Cantrell on 2020 for symptomatic slow Afib, occasional mode switches but getting paced a lot.  Starting to feel better.  On Apixaban for [id number] of 3, no prior TIA/CVA.    He is not limited by chest pain, pressure or tightness with activity.   No significant dyspnea on exertion, orthopnea or lower extremity swelling.    Left eye operation, some balance problems.  No symptoms of leg claudication.   No stroke/TIA like symptoms.  His balance feels off a bit after eye surgery.  Has noted from fatigue, slowing down.    He is having some fatigue.  No presyncope/syncope.     No family history of premature coronary artery disease.  Father had CVA around age 67.  Mother  at 62 of breast CA.  No siblings.   No history of diabetes.  No history of autoimmune disease such as lupus or rheumatoid arthritis.  No chronic kidney disease.  Rare ETOH.    Put on 12 pounds, planning to lose it.  Likes salt, sun flower seeds with salt.    Still struggling to sleep after right shoulder  "surgery. Ambien made him worse.  PT made this better and he is sleeping better.    From [facility], mother [place]ian, Father Polish, fought with [place]an resistance, he was conceived during the blitzkrieg, moved to the [facility] in [place], age 8. Lived in [place], until the [place] after high school. Retired from [place], [place], moved to [facility], then [place], was too hot, landed here, lived in [place].  Raised 2 daughters in [place] and [place].  One daughter in [place], one in [place]. Son-in-law Principal at [employer].    Wife is Amy (goes by \"Kenneth,\" does not like Amy), not here today, in good health,  44 years. They met in [facility], she is from [place].    DATA REVIEWED by me:  ECG [id number]  Probably afib, 40 though cannot exclude SSS with high grade heart block    ECG 6-  Sinus bradycardia, wenckebach, 41, heart block    ECG 12-17-19  Sinus, 56, first degree AV delay.    PM 8-  A paced 60%, V 98%, no mode switch    Pacemaker placement 7/17/2020 Dr. Cantrell  [employer] device with RA and RV lead    Echo 12-18-19  No prior study is available for comparison.   Left ventricular ejection fraction is visually estimated to be 65%.  Mild concentric left ventricular hypertrophy.  Mildly dilated left atrium.  Calcified aortic valve leaflets, rheumatic appearing.  Mild to moderate aortic stenosis, visually appears moderate: Vmax is    2.9 m/s, MG 20 mmHg, BRE 1.4 cm2.  Trace tricuspid regurgitation.  Estimated right ventricular systolic pressure  is 30 mmHg.     Most recent labs:     Lab Results   Component Value Date/Time    HEMOGLOBIN 12.0 (L) 07/17/2020 07:35 AM    HEMATOCRIT 35.8 (L) 07/17/2020 07:35 AM    .5 (H) 07/17/2020 07:35 AM    INR 1.02 07/17/2020 07:35 AM    TSH 3.320 08/15/2019 04:18 AM      Lab Results   Component Value Date/Time    SODIUM 139 07/17/2020 07:35 AM    POTASSIUM 4.5 07/17/2020 07:35 AM    CHLORIDE 105 07/17/2020 07:35 AM    CO2 22 07/17/2020 07:35 AM    GLUCOSE 97 " 2020 07:35 AM    BUN 19 2020 07:35 AM    CREATININE 0.77 2020 07:35 AM      Lab Results   Component Value Date/Time    ASTSGOT 16 2020 07:35 AM    ALTSGPT 18 2020 07:35 AM    ALBUMIN 4.0 2020 07:35 AM      Lab Results   Component Value Date/Time    CHOLSTRLTOT 184 08/15/2019 04:18 AM     (H) 08/15/2019 04:18 AM    HDL 43 08/15/2019 04:18 AM    TRIGLYCERIDE 145 08/15/2019 04:18 AM       8-15-19 H 14.9, p 141, na 140, k 4.5, cr 1.12, lfts normal, ldl 112, hdl 43, tg 145, tc 184, TSH 3.32    Past Medical History:   Diagnosis Date   • AV block, complete (HCC) 2020    Status post pacemaker placement   • History of chickenpox    • History of measles    • History of mumps    • History of rheumatic fever    • Hypertension    • Paroxysmal atrial fibrillation (HCC)      Past Surgical History:   Procedure Laterality Date   • PACEMAKER INSERTION Left 2020    [employer] Accolade MRI L311 implanted by Dr. Cantrell.   • PB RECONSTR TOTAL SHOULDER IMPLANT Right 2020    Procedure: RT ARTHROPLASTY, SHOULDER, TOTAL;  Surgeon: Arsenio Hernandez M.D.;  Location: SURGERY [facility];  Service: Orthopedics   • OTHER      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   • OTHER ORTHOPEDIC SURGERY      bilat RCR     Family History   Problem Relation Age of Onset   • Cancer Mother         Breast,  at 62   • Stroke Father         CVA age 67     Social History     Socioeconomic History   • Marital status:      Spouse name: Not on file   • Number of children: Not on file   • Years of education: Not on file   • Highest education level: Not on file   Occupational History   • Not on file   Social Needs   • Financial resource strain: Not on file   • Food insecurity     Worry: Not on file     Inability: Not on file   • Transportation needs     Medical: Not on file     Non-medical: Not on file   Tobacco Use   • Smoking status: Current Some Day Smoker     Packs/day: 0.30     Types: Cigarettes  "  • Smokeless tobacco: Never Used   Substance and Sexual Activity   • Alcohol use: Yes     Alcohol/week: 1.8 oz     Types: 1 Cans of beer, 2 Shots of liquor per week     Frequency: 2-3 times a week     Drinks per session: 1 or 2   • Drug use: No   • Sexual activity: Not on file   Lifestyle   • Physical activity     Days per week: Not on file     Minutes per session: Not on file   • Stress: Not on file   Relationships   • Social connections     Talks on phone: Not on file     Gets together: Not on file     Attends Mormonism service: Not on file     Active member of club or organization: Not on file     Attends meetings of clubs or organizations: Not on file     Relationship status: Not on file   • Intimate partner violence     Fear of current or ex partner: Not on file     Emotionally abused: Not on file     Physically abused: Not on file     Forced sexual activity: Not on file   Other Topics Concern   • Not on file   Social History Narrative   • Not on file     Allergies   Allergen Reactions   • Typhoid Vaccines        Current Outpatient Medications   Medication Sig Dispense Refill   • apixaban (ELIQUIS) 5mg Tab Take 1 Tab by mouth 2 Times a Day. 180 Tab 1   • triamcinolone acetonide (KENALOG) 0.1 % Cream      • ciclopirox (LOPROX) 0.77 % cream      • multivitamin (THERAGRAN) Tab Take 1 Tab by mouth every day.     • lisinopril (PRINIVIL) 2.5 MG Tab Take 1 Tab by mouth every day. 90 Tab 3     No current facility-administered medications for this visit.        ROS    All others systems reviewed and negative.     Objective:     /78 (BP Location: Right arm, Patient Position: Sitting)   Pulse 62   Ht 1.778 m (5' 10\")   Wt 94.3 kg (208 lb)   SpO2 96%  Body mass index is 29.84 kg/m².    Physical Exam   General: No acute distress. Well nourished.  HEENT: EOM grossly intact, no scleral icterus, no pharyngeal erythema.   Neck:  No JVD, no bruits, trachea midline  CVS: RRR,3/6 mid peaking sys murmur R, LLSB, No LE " edema.  2+ radial pulses, 2+ PT pulses, PM pocket in tact  Resp: CTAB. No wheezing or crackles/rhonchi. Normal respiratory effort.  Abdomen: Soft, NT, no juliette hepatomegaly.  MSK/Ext: No clubbing or cyanosis.  Skin: Warm and dry, no rashes. Bruise over right chest  Neurological: CN III-XII grossly intact. No focal deficits. Resting tremor.  Psych: A&O x 3, appropriate affect, good judgement    Physical exam performed today and unchanged, except what is noted, compared to 8-    Assessment:     1. Cardiac pacemaker in situ     2. Paroxysmal atrial fibrillation (HCC)     3. AV block, complete (HCC)     4. Rheumatic aortic stenosis  EC-ECHOCARDIOGRAM COMPLETE W/O CONT   5. Other fatigue     6. LVH (left ventricular hypertrophy)     7. Tobacco use     8. Essential hypertension     9. Pure hypercholesterolemia     10. FRANCHESCA (obstructive sleep apnea)     11. Chronic anticoagulation         Medical Decision Making:  Today's Assessment / Status / Plan:     -PM checks in [place]  -PAF, [id number] of 3, no prior TIA/CVA.  -Might need surgery, would not high risk and ok to hold apixaban 48 hour.  -BP is better now that he is sleeping.  -Untreated FRANCHESCA, didn't like CPAP.  -He has very mild HLP, no meds yet  -Tremor, sounds like mostly resting, consider propranolol, he would like to try better sleep first.  -Repeat echo 1 year  AS, , will order next visit  -Needs to quit smoking, he had cut back, wondering if he can stop psychologically completely.  -PM pocket bothering him a bit but will wait to see if it improves.  -RTC 6 months    Written instructions given today:  -Propranolol is a popular medication for benign essential tremor, it would be ok for you to be on this. However, you may want to talk to Apollo Cavazos about what kind of tremor you have.    -Echocardiogram- heart pictures to look at the heart structure and pump function. This is to look at your heart valve, at your convenience.      Return in about 6 months  (around 4/16/2021).    It is my pleasure to participate in the care of Mr. Bowling.  Please do not hesitate to contact me with questions or concerns.    Hayley Barker MD, [facility]  Cardiologist [facility] for [facility] V[facility]    Please note that this dictation was created using voice recognition software. I have made every reasonable attempt to correct obvious errors, but it is possible there are errors of grammar and possibly content that I did not discover before finalizing the note.

## 2020-10-16 ENCOUNTER — OFFICE VISIT (OUTPATIENT)
Dept: CARDIOLOGY | Facility: CLINIC | Age: 75
End: 2020-10-16
Payer: MEDICARE

## 2020-10-16 VITALS
DIASTOLIC BLOOD PRESSURE: 78 MMHG | HEIGHT: 70 IN | HEART RATE: 62 BPM | WEIGHT: 208 LBS | SYSTOLIC BLOOD PRESSURE: 130 MMHG | BODY MASS INDEX: 29.78 KG/M2 | OXYGEN SATURATION: 96 %

## 2020-10-16 DIAGNOSIS — G47.33 OSA (OBSTRUCTIVE SLEEP APNEA): ICD-10-CM

## 2020-10-16 DIAGNOSIS — I06.0 RHEUMATIC AORTIC STENOSIS: ICD-10-CM

## 2020-10-16 DIAGNOSIS — Z95.0 CARDIAC PACEMAKER IN SITU: ICD-10-CM

## 2020-10-16 DIAGNOSIS — R53.83 OTHER FATIGUE: ICD-10-CM

## 2020-10-16 DIAGNOSIS — I44.2 AV BLOCK, COMPLETE (HCC): ICD-10-CM

## 2020-10-16 DIAGNOSIS — I10 ESSENTIAL HYPERTENSION: ICD-10-CM

## 2020-10-16 DIAGNOSIS — I48.0 PAROXYSMAL ATRIAL FIBRILLATION (HCC): ICD-10-CM

## 2020-10-16 DIAGNOSIS — Z72.0 TOBACCO USE: ICD-10-CM

## 2020-10-16 DIAGNOSIS — I51.7 LVH (LEFT VENTRICULAR HYPERTROPHY): ICD-10-CM

## 2020-10-16 DIAGNOSIS — E78.00 PURE HYPERCHOLESTEROLEMIA: ICD-10-CM

## 2020-10-16 DIAGNOSIS — Z79.01 CHRONIC ANTICOAGULATION: ICD-10-CM

## 2020-10-16 PROCEDURE — 99214 OFFICE O/P EST MOD 30 MIN: CPT | Performed by: INTERNAL MEDICINE

## 2020-10-16 ASSESSMENT — FIBROSIS 4 INDEX: FIB4 SCORE: 1.52

## 2020-10-16 NOTE — LETTER
[facility] [address],   [address]nd [place] [address]  Phone: [phone]  Fax: [phone]              aJcobo Bowling  1945    Encounter Date: 10/16/2020    Hayley Barker M.D.          PROGRESS NOTE:  Subjective:   Chief Complaint:   Chief Complaint   Patient presents with   • Aortic Stenosis     Jacobo Bowling is a 75 y.o. male who returns for mild to mod AS, prior rheumatic heart disease, symptomatic bradycardia, paroxysmal atrial fibrillation fatigue, HTN with LVH.    At age 11, had rheumatic fever, joint pain, could not walk, then heart murmur same time, was in the hospital for weeks. Then murmur went away, was in the [employer] for many years. They did extensive testing, did not find anything wrong.  Our echo 12-19 with mild to mod AS.    Has HTN with mild LVH, on low dose meds, doing better, BP at home cuff is elevated compared to ours.    Also has untreated FRANCHESCA, was given mask but didn't feel he could wear it.    Still smoking, was 8-10 cig per day, smoking since age 15, down to 1-2, just a few puffs. Still working it, 3-4 per day. Not yet ready to quit.    Has mild HLP, .    Patient had a [employer] pacemaker placed with Dr. Cantrell on 2020 for symptomatic slow Afib, occasional mode switches but getting paced a lot.  Starting to feel better.  On Apixaban for [id number] of 3, no prior TIA/CVA.    He is not limited by chest pain, pressure or tightness with activity.   No significant dyspnea on exertion, orthopnea or lower extremity swelling.    Left eye operation, some balance problems.  No symptoms of leg claudication.   No stroke/TIA like symptoms.  His balance feels off a bit after eye surgery.  Has noted from fatigue, slowing down.    He is having some fatigue.  No presyncope/syncope.     No family history of premature coronary artery disease.  Father had CVA around age 67.  Mother  at 62 of  "breast CA.  No siblings.   No history of diabetes.  No history of autoimmune disease such as lupus or rheumatoid arthritis.  No chronic kidney disease.  Rare ETOH.    Put on 12 pounds, planning to lose it.  Likes salt, sun flower seeds with salt.    Still struggling to sleep after right shoulder surgery. Ambien made him worse.  PT made this better and he is sleeping better.    From [facility], mother [place], Father Polish, fought with [place]an resistance, he was conceived during the blitzkrieg, moved to the [facility] in [place], age 8. Lived in [place], until the [place] after [place] school. Retired from [place], [place], moved to [facility], then [place], was too hot, landed here, lived in [place].  Raised 2 daughters in [place] and [place].  One daughter in [place], one in [place]. Son-in-law Principal at [employer].    Wife is Amy (goes by \"Kenneth,\" does not like Amy), not here today, in good health,  44 years. They met in [facility], she is from [place].    DATA REVIEWED by me:  ECG [id number]  Probably afib, 40 though cannot exclude SSS with high grade heart block    ECG 6-  Sinus bradycardia, wenckebach, 41, heart block    ECG 12-17-19  Sinus, 56, first degree AV delay.    PM 8-  A paced 60%, V 98%, no mode switch    Pacemaker placement 7/17/2020 Dr. Cantrell  [employer] device with RA and RV lead    Echo 12-18-19  No prior study is available for comparison.   Left ventricular ejection fraction is visually estimated to be 65%.  Mild concentric left ventricular hypertrophy.  Mildly dilated left atrium.  Calcified aortic valve leaflets, rheumatic appearing.  Mild to moderate aortic stenosis, visually appears moderate: Vmax is    2.9 m/s, MG 20 mmHg, BRE 1.4 cm2.  Trace tricuspid regurgitation.  Estimated right ventricular systolic pressure  is 30 mmHg.     Most recent labs:     Lab Results   Component Value Date/Time    HEMOGLOBIN 12.0 (L) 07/17/2020 07:35 AM    HEMATOCRIT 35.8 (L) 07/17/2020 07:35 AM    MCV " 109.5 (H) 2020 07:35 AM    INR 1.02 2020 07:35 AM    TSH 3.320 08/15/2019 04:18 AM      Lab Results   Component Value Date/Time    SODIUM 139 2020 07:35 AM    POTASSIUM 4.5 2020 07:35 AM    CHLORIDE 105 2020 07:35 AM    CO2 22 2020 07:35 AM    GLUCOSE 97 2020 07:35 AM    BUN 19 2020 07:35 AM    CREATININE 0.77 2020 07:35 AM      Lab Results   Component Value Date/Time    ASTSGOT 16 2020 07:35 AM    ALTSGPT 18 2020 07:35 AM    ALBUMIN 4.0 2020 07:35 AM      Lab Results   Component Value Date/Time    CHOLSTRLTOT 184 08/15/2019 04:18 AM     (H) 08/15/2019 04:18 AM    HDL 43 08/15/2019 04:18 AM    TRIGLYCERIDE 145 08/15/2019 04:18 AM       8-15-19 H 14.9, p 141, na 140, k 4.5, cr 1.12, lfts normal, ldl 112, hdl 43, tg 145, tc 184, TSH 3.32    Past Medical History:   Diagnosis Date   • AV block, complete (HCC) 2020    Status post pacemaker placement   • History of chickenpox    • History of measles    • History of mumps    • History of rheumatic fever    • Hypertension    • Paroxysmal atrial fibrillation (HCC)      Past Surgical History:   Procedure Laterality Date   • PACEMAKER INSERTION Left 2020    [employer] Accolade MRI L311 implanted by Dr. Cantrell.   • PB RECONSTR TOTAL SHOULDER IMPLANT Right 2020    Procedure: RT ARTHROPLASTY, SHOULDER, TOTAL;  Surgeon: Arsenio Hernandez M.D.;  Location: SURGERY [facility];  Service: Orthopedics   • OTHER      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   • OTHER ORTHOPEDIC SURGERY      bilat RCR     Family History   Problem Relation Age of Onset   • Cancer Mother         Breast,  at 62   • Stroke Father         CVA age 67     Social History     Socioeconomic History   • Marital status:      Spouse name: Not on file   • Number of children: Not on file   • Years of education: Not on file   • Highest education level: Not on file   Occupational History   • Not on file   Social Needs   •  "Financial resource strain: Not on file   • Food insecurity     Worry: Not on file     Inability: Not on file   • Transportation needs     Medical: Not on file     Non-medical: Not on file   Tobacco Use   • Smoking status: Current Some Day Smoker     Packs/day: 0.30     Types: Cigarettes   • Smokeless tobacco: Never Used   Substance and Sexual Activity   • Alcohol use: Yes     Alcohol/week: 1.8 oz     Types: 1 Cans of beer, 2 Shots of liquor per week     Frequency: 2-3 times a week     Drinks per session: 1 or 2   • Drug use: No   • Sexual activity: Not on file   Lifestyle   • Physical activity     Days per week: Not on file     Minutes per session: Not on file   • Stress: Not on file   Relationships   • Social connections     Talks on phone: Not on file     Gets together: Not on file     Attends Anabaptist service: Not on file     Active member of club or organization: Not on file     Attends meetings of clubs or organizations: Not on file     Relationship status: Not on file   • Intimate partner violence     Fear of current or ex partner: Not on file     Emotionally abused: Not on file     Physically abused: Not on file     Forced sexual activity: Not on file   Other Topics Concern   • Not on file   Social History Narrative   • Not on file     Allergies   Allergen Reactions   • Typhoid Vaccines        Current Outpatient Medications   Medication Sig Dispense Refill   • apixaban (ELIQUIS) 5mg Tab Take 1 Tab by mouth 2 Times a Day. 180 Tab 1   • triamcinolone acetonide (KENALOG) 0.1 % Cream      • ciclopirox (LOPROX) 0.77 % cream      • multivitamin (THERAGRAN) Tab Take 1 Tab by mouth every day.     • lisinopril (PRINIVIL) 2.5 MG Tab Take 1 Tab by mouth every day. 90 Tab 3     No current facility-administered medications for this visit.        ROS    All others systems reviewed and negative.     Objective:     /78 (BP Location: Right arm, Patient Position: Sitting)   Pulse 62   Ht 1.778 m (5' 10\")   Wt 94.3 kg " (208 lb)   SpO2 96%  Body mass index is 29.84 kg/m².    Physical Exam   General: No acute distress. Well nourished.  HEENT: EOM grossly intact, no scleral icterus, no pharyngeal erythema.   Neck:  No JVD, no bruits, trachea midline  CVS: RRR,3/6 mid peaking sys murmur R, LLSB, No LE edema.  2+ radial pulses, 2+ PT pulses, PM pocket in tact  Resp: CTAB. No wheezing or crackles/rhonchi. Normal respiratory effort.  Abdomen: Soft, NT, no juliette hepatomegaly.  MSK/Ext: No clubbing or cyanosis.  Skin: Warm and dry, no rashes. Bruise over right chest  Neurological: CN III-XII grossly intact. No focal deficits. Resting tremor.  Psych: A&O x 3, appropriate affect, good judgement    Physical exam performed today and unchanged, except what is noted, compared to 8-    Assessment:     1. Cardiac pacemaker in situ     2. Paroxysmal atrial fibrillation (HCC)     3. AV block, complete (HCC)     4. Rheumatic aortic stenosis  EC-ECHOCARDIOGRAM COMPLETE W/O CONT   5. Other fatigue     6. LVH (left ventricular hypertrophy)     7. Tobacco use     8. Essential hypertension     9. Pure hypercholesterolemia     10. FRANCHESCA (obstructive sleep apnea)     11. Chronic anticoagulation         Medical Decision Making:  Today's Assessment / Status / Plan:     -PM checks in [place]  -PAF, [id number] of 3, no prior TIA/CVA.  -Might need surgery, would not high risk and ok to hold apixaban 48 hour.  -BP is better now that he is sleeping.  -Untreated FRANCHESCA, didn't like CPAP.  -He has very mild HLP, no meds yet  -Tremor, sounds like mostly resting, consider propranolol, he would like to try better sleep first.  -Repeat echo 1 year  AS, , will order next visit  -Needs to quit smoking, he had cut back, wondering if he can stop psychologically completely.  -PM pocket bothering him a bit but will wait to see if it improves.  -RTC 6 months    Written instructions given today:  -Propranolol is a popular medication for benign essential tremor, it  would be ok for you to be on this. However, you may want to talk to Apollo Cavazos about what kind of tremor you have.    -Echocardiogram- heart pictures to look at the heart structure and pump function. This is to look at your heart valve, at your convenience.      Return in about 6 months (around 4/16/2021).    It is my pleasure to participate in the care of Mr. Bowling.  Please do not hesitate to contact me with questions or concerns.    Hayley Barker MD, [facility]  Cardiologist [facility] for [facility] and V[facility]    Please note that this dictation was created using voice recognition software. I have made every reasonable attempt to correct obvious errors, but it is possible there are errors of grammar and possibly content that I did not discover before finalizing the note.      Han Cavazos, P.A.-C.  [address]  [place] [address]  Via In Basket

## 2020-10-16 NOTE — PATIENT INSTRUCTIONS
-Propranolol is a popular medication for benign essential tremor, it would be ok for you to be on this. However, you may want to talk to Apollo Cavazos about what kind of tremor you have.    -Echocardiogram- heart pictures to look at the heart structure and pump function. This is to look at your heart valve, at your convenience.

## 2020-12-01 ENCOUNTER — TELEPHONE (OUTPATIENT)
Dept: CARDIOLOGY | Facility: MEDICAL CENTER | Age: 75
End: 2020-12-01

## 2020-12-02 ENCOUNTER — ANCILLARY PROCEDURE (OUTPATIENT)
Dept: CARDIOLOGY | Facility: IMAGING CENTER | Age: 75
End: 2020-12-02
Attending: INTERNAL MEDICINE
Payer: MEDICARE

## 2020-12-02 ENCOUNTER — NON-PROVIDER VISIT (OUTPATIENT)
Dept: CARDIOLOGY | Facility: PHYSICIAN GROUP | Age: 75
End: 2020-12-02
Payer: MEDICARE

## 2020-12-02 VITALS
BODY MASS INDEX: 30.21 KG/M2 | HEART RATE: 60 BPM | HEIGHT: 70 IN | DIASTOLIC BLOOD PRESSURE: 80 MMHG | SYSTOLIC BLOOD PRESSURE: 134 MMHG | OXYGEN SATURATION: 98 % | WEIGHT: 211 LBS

## 2020-12-02 DIAGNOSIS — I06.0 RHEUMATIC AORTIC STENOSIS: ICD-10-CM

## 2020-12-02 DIAGNOSIS — I48.0 PAROXYSMAL ATRIAL FIBRILLATION (HCC): ICD-10-CM

## 2020-12-02 DIAGNOSIS — Z79.01 CHRONIC ANTICOAGULATION: ICD-10-CM

## 2020-12-02 DIAGNOSIS — Z95.0 CARDIAC PACEMAKER IN SITU: ICD-10-CM

## 2020-12-02 DIAGNOSIS — I44.2 AV BLOCK, COMPLETE (HCC): ICD-10-CM

## 2020-12-02 PROCEDURE — 93280 PM DEVICE PROGR EVAL DUAL: CPT | Performed by: NURSE PRACTITIONER

## 2020-12-02 RX ORDER — AMOXICILLIN AND CLAVULANATE POTASSIUM 875; 125 MG/1; MG/1
TABLET, FILM COATED ORAL
COMMUNITY
Start: 2020-11-12 | End: 2021-01-19

## 2020-12-02 ASSESSMENT — FIBROSIS 4 INDEX: FIB4 SCORE: 1.52

## 2020-12-02 NOTE — TELEPHONE ENCOUNTER
Clearance request received from [facility] for pt's left carpal tunnel release and left ganglion cyst excision on 12/3. LS addresses clearance in her last note on 10/16/20:        Office note faxed to Al at [phone], receipt confirmed.

## 2020-12-02 NOTE — TELEPHONE ENCOUNTER
LS    TO: 2p/Tue/Ofc  NM: Selene   HOSP: [facility]   PH: [phone]  EXT: 2   PT NM: Jacobo Bowling   : 1945   REG DR: Dr Barker   RE: Confirm if office received forms  for surgical clearance and  clarification on medication. Surgery  scheduled for .   DISP HIST: 2020 01:47P AW P/Disp  2020 01:52P  checked

## 2020-12-02 NOTE — PROGRESS NOTES
Device is working normally. No mode switching episodes.  Normal sensing of RA lead; unable to measure R waves. Stable capture of RA and RV leads; stable impedances. Battery longevity is 7.5 years.  No changes are made today.    FU in 6 months for next PM check with me, as well as FU with Dr. Barker.

## 2020-12-03 LAB
LV EJECT FRACT  99904: 55
LV EJECT FRACT MOD 2C 99903: 56.26
LV EJECT FRACT MOD 4C 99902: 57.57
LV EJECT FRACT MOD BP 99901: 57.49

## 2020-12-03 PROCEDURE — 93306 TTE W/DOPPLER COMPLETE: CPT | Mod: 26 | Performed by: INTERNAL MEDICINE

## 2021-01-11 DIAGNOSIS — I10 ESSENTIAL HYPERTENSION: ICD-10-CM

## 2021-01-11 RX ORDER — LISINOPRIL 2.5 MG/1
2.5 TABLET ORAL DAILY
Qty: 90 TAB | Refills: 1 | Status: SHIPPED | OUTPATIENT
Start: 2021-01-11 | End: 2021-03-01 | Stop reason: SDUPTHER

## 2021-01-15 ENCOUNTER — TELEPHONE (OUTPATIENT)
Dept: CARDIOLOGY | Facility: MEDICAL CENTER | Age: 76
End: 2021-01-15

## 2021-01-15 NOTE — TELEPHONE ENCOUNTER
Returned call. Pt states about 5 weeks ago he had left hand/wrist surgery which he temporarily stopped eliquis for. Recently, pt's wrist became swollen and painful, he says he was told it is possible gout and/or arthritis.    However, it caused him extreme pain so he took advil and this helped greatly. He is aware of the interaction between advil and eliquis, and he made the decision to stop his Eliquis yesterday so he could take the advil. He reports he only skipped eliquis for one day. I advised pt not to skip any doses of Eliquis as this is extremely important to prevent blood clots/stroke.     He would like to know if he can take Advil and Eliquis together, I informed pt that this is not recommended as it can increase the risk for bleeding. Pt reports that tylenol does not help the pain at all, only advil.     Patient would like to have LS's input on whether or not he can take Advil and Eliquis together and if not, if she has any further recommendations for pain control. Pt does state he has an appt w/ his PCP to discuss gout treatment options, as he believes his wrist pain may improve after getting that handled.     I did also advise pt that if the pain was excruciating and he decided to take advil, not to stop his Eliquis, just do not make habit of it and limit the amount of times he takes them together as much as possible and also monitor for bleeding.     To LS, please let me know if you have any further recommendations

## 2021-01-15 NOTE — TELEPHONE ENCOUNTER
LS    NM: Jacobo Bowling   PH: [phone]   PT NM: TawnyaJacobo    : 41   REG DR: Dr Barker    RE: Please call regarding Eliquis rx,  has questions regarding dosage  modification.   DISP HIST: 01/15/2021 02:28P TO P/disp    --------------------------------------  Message History  Account: [id number]  Taken:  Fri 15-Javon-2021  2:28p TO  Serial#: [id number]      Thank you,  Anuja LAI

## 2021-01-15 NOTE — TELEPHONE ENCOUNTER
Handled perfectly.  I agree.    He should not stop Eliquis, the risk of stroke is not worth it.  Bleeding is preferred to a stroke.    If you must take it, use it sparingly and try to alternate with Tylenol.  Any other further recommendations about pain control would have to come from Han Cavazos.  If he has had surgery on the wrist and is extremely painful, he may need to see the surgeon to ensure that nothing serious is happened.    Thank you.

## 2021-01-16 NOTE — TELEPHONE ENCOUNTER
Returned call, informed pt. He understands and will further discuss gout/pain control issues w/ PCP next week. He also stated that the surgeon is aware of his wrist issue.

## 2021-03-01 ENCOUNTER — TELEPHONE (OUTPATIENT)
Dept: CARDIOLOGY | Facility: MEDICAL CENTER | Age: 76
End: 2021-03-01

## 2021-03-01 DIAGNOSIS — I10 ESSENTIAL HYPERTENSION: ICD-10-CM

## 2021-03-01 RX ORDER — LISINOPRIL 2.5 MG/1
2.5 TABLET ORAL DAILY
Qty: 90 TABLET | Refills: 1 | Status: SHIPPED | OUTPATIENT
Start: 2021-03-01 | End: 2021-03-01 | Stop reason: SDUPTHER

## 2021-03-01 RX ORDER — LISINOPRIL 2.5 MG/1
2.5 TABLET ORAL DAILY
Qty: 90 TABLET | Refills: 1 | Status: SHIPPED | OUTPATIENT
Start: 2021-03-01 | End: 2021-08-24

## 2021-03-01 NOTE — TELEPHONE ENCOUNTER
"LS    Pt called for Rx lisinopril (PRINIVIL) 2.5 MG Tab. Pt says his bottle states \"no refills, dr white required.\" Pt states that he only has 4 pills left and uses the [facility] in [place] on [facility]. Please call pt back to further discuss at Home:[phone] or cell:[phone].     Thank you    Asked pt to check with pharmacy just in case.   "

## 2021-03-25 DIAGNOSIS — Z79.01 CHRONIC ANTICOAGULATION: ICD-10-CM

## 2021-03-26 RX ORDER — APIXABAN 5 MG/1
TABLET, FILM COATED ORAL
Qty: 180 TABLET | Refills: 2 | Status: SHIPPED | OUTPATIENT
Start: 2021-03-26 | End: 2021-06-02 | Stop reason: SDUPTHER

## 2021-05-07 ENCOUNTER — TELEPHONE (OUTPATIENT)
Dept: CARDIOLOGY | Facility: MEDICAL CENTER | Age: 76
End: 2021-05-07

## 2021-05-08 NOTE — TELEPHONE ENCOUNTER
FYI:  Remote Transmission 4/30/2021:    1-NSVT episode~12bts 4/29/2021@11:20am lasting 16 secs.    Full report scanned into media.

## 2021-05-11 NOTE — TELEPHONE ENCOUNTER
Pt returning call. Tried reaching nurse, but unavailable. Please call Pt back at [phone].    Thank you

## 2021-05-11 NOTE — TELEPHONE ENCOUNTER
Hayley Barker M.D.  You; JAROD River. 3 days ago     Janee,   Please call and find out if he is having any symptoms of lightheadedness or heart palpitations.  I discussed propranolol with him which would be a good medication to treat nonsustained ventricular tachycardia.  If he is not taking it, asked him to try it.  If he is not taking propranolol I would put him on metoprolol NSAIDs let me know.     Thank you.     FYI only to AB at this time    Message text      Lvm asking pt to call back to further discuss.

## 2021-05-11 NOTE — TELEPHONE ENCOUNTER
Pt called back again. S/w pt, he denies any cardiac symptoms on 4/29 and currently. He is concerned about some swelling in his left wrist/arm after having carpal tunnel surgery. He states that the surgeon has seen it and is aware and he has an appt w/ a different specialist for further evaluation on 5/24. I advised pt to call us if he develops any cardiac symptoms before his appt on 6/2.

## 2021-06-02 ENCOUNTER — NON-PROVIDER VISIT (OUTPATIENT)
Dept: CARDIOLOGY | Facility: PHYSICIAN GROUP | Age: 76
End: 2021-06-02
Payer: MEDICARE

## 2021-06-02 ENCOUNTER — OFFICE VISIT (OUTPATIENT)
Dept: CARDIOLOGY | Facility: PHYSICIAN GROUP | Age: 76
End: 2021-06-02
Payer: MEDICARE

## 2021-06-02 VITALS
HEART RATE: 64 BPM | WEIGHT: 208 LBS | RESPIRATION RATE: 16 BRPM | HEIGHT: 70 IN | DIASTOLIC BLOOD PRESSURE: 70 MMHG | BODY MASS INDEX: 29.78 KG/M2 | OXYGEN SATURATION: 95 % | SYSTOLIC BLOOD PRESSURE: 132 MMHG

## 2021-06-02 VITALS
SYSTOLIC BLOOD PRESSURE: 132 MMHG | BODY MASS INDEX: 29.78 KG/M2 | RESPIRATION RATE: 16 BRPM | OXYGEN SATURATION: 95 % | DIASTOLIC BLOOD PRESSURE: 70 MMHG | HEIGHT: 70 IN | WEIGHT: 208 LBS | HEART RATE: 64 BPM

## 2021-06-02 DIAGNOSIS — R53.83 OTHER FATIGUE: ICD-10-CM

## 2021-06-02 DIAGNOSIS — I06.0 RHEUMATIC AORTIC STENOSIS: ICD-10-CM

## 2021-06-02 DIAGNOSIS — G47.33 OSA (OBSTRUCTIVE SLEEP APNEA): ICD-10-CM

## 2021-06-02 DIAGNOSIS — I44.2 AV BLOCK, COMPLETE (HCC): ICD-10-CM

## 2021-06-02 DIAGNOSIS — I51.7 LVH (LEFT VENTRICULAR HYPERTROPHY): ICD-10-CM

## 2021-06-02 DIAGNOSIS — I35.0 NONRHEUMATIC AORTIC VALVE STENOSIS: ICD-10-CM

## 2021-06-02 DIAGNOSIS — E78.00 PURE HYPERCHOLESTEROLEMIA: ICD-10-CM

## 2021-06-02 DIAGNOSIS — I48.0 PAROXYSMAL ATRIAL FIBRILLATION (HCC): ICD-10-CM

## 2021-06-02 DIAGNOSIS — Z79.01 CHRONIC ANTICOAGULATION: ICD-10-CM

## 2021-06-02 DIAGNOSIS — Z72.0 TOBACCO USE: ICD-10-CM

## 2021-06-02 DIAGNOSIS — Z95.0 CARDIAC PACEMAKER IN SITU: ICD-10-CM

## 2021-06-02 DIAGNOSIS — I10 ESSENTIAL HYPERTENSION: ICD-10-CM

## 2021-06-02 PROCEDURE — 99214 OFFICE O/P EST MOD 30 MIN: CPT | Performed by: INTERNAL MEDICINE

## 2021-06-02 PROCEDURE — 93280 PM DEVICE PROGR EVAL DUAL: CPT | Performed by: NURSE PRACTITIONER

## 2021-06-02 ASSESSMENT — FIBROSIS 4 INDEX
FIB4 SCORE: 1.52
FIB4 SCORE: 1.52

## 2021-06-02 NOTE — PROGRESS NOTES
Subjective:   Chief Complaint:   Chief Complaint   Patient presents with   • Aortic Stenosis     Jacobo Bowling is a 75 y.o. male who returns for mild to mod AS, prior rheumatic heart disease, symptomatic bradycardia, paroxysmal atrial fibrillation fatigue, HTN with LVH, transmission once for NS VT.    At age 11, had rheumatic fever, joint pain, could not walk, then heart murmur same time, was in the hospital for weeks. Then murmur went away, was in the [employer] for many years. They did extensive testing, did not find anything wrong.  Our echo  and  with mild to mod AS.    Has HTN with mild LVH on echo , I did not see LVH on  echo, on low dose meds, doing better, BP at home cuff is elevated compared to ours.  His BP often in the low 130s over 70s.    Also has untreated FRANCHESCA, was given mask but didn't feel he could wear it.    Considers himself to not be smoking anymore, just a few puffs, does not inhale.    Has mild HLP, .    [employer] pacemaker placed with Dr. Cantrell on 2020 for symptomatic slow Afib, occasional mode switches but getting paced a lot.  Starting to feel better.  On Apixaban for [id number] of 3, no prior TIA/CVA.  He never felt better but V paced 98%, A paced 56%.    Has remote transmission 16 seconds nonsustained VT 2021. Doing ok.  No BB.    He is not limited by chest pain, pressure or tightness with activity.   No significant dyspnea on exertion, orthopnea or lower extremity swelling.    Left eye operation, some balance problems.  No symptoms of leg claudication.   No stroke/TIA like symptoms.  His balance feels off a bit after eye surgery.  Has noted from fatigue, slowing down.    He is having some fatigue.  No presyncope/syncope.    Has slight intention tremor, does not bother him. We discussed propranolol but he is not needing this.     No family history of premature coronary artery disease.  Father had CVA around age 67.  Mother  at 62 of breast  "CA.  No siblings.   No history of diabetes.  No history of autoimmune disease such as lupus or rheumatoid arthritis.  No chronic kidney disease.  Rare ETOH.    LUE edema after wrist surgery.    From [place], mother [place], Father Polish, fought with [place] resistance, he was conceived during the blitzkrieg, moved to the [facility] in [place], age 8. Lived in [place], until the [employer] after high school. Retired from [place], [place], moved to [facility], then [place], was too hot, landed here, lived in [place].  Raised 2 daughters in [place] and [place].  One daughter in [place], one in [place]. Son-in-law Principal at [place] [employer].    Wife is Amy (goes by \"Kenneth,\" does not like Amy), not here today, in good health,  44 years. They met in [facility], she is from [place].  Served in the [employer].    DATA REVIEWED by me:  ECG [id number]  Probably afib, 40 though cannot exclude SSS with high grade heart block    ECG 6-  Sinus bradycardia, wenckebach, 41, heart block    ECG 12-17-19  Sinus, 56, first degree AV delay.    PM check 6-2-21  A paced 56, V paced 98%, 3 episodes, 0.6, <1%.    PM 8-  A paced 60%, V 98%, no mode switch    Pacemaker placement 7/17/2020 Dr. Cantrell  [employer] device with RA and RV lead    Echo 12-3-2020  Left ventricular ejection fraction is visually estimated to be 55-60%.  Grade II diastolic dysfunction.  Pacer/ICD wire seen in right ventricle.  Moderately dilated left atrium.  Calcified aortic valve leaflets with mild to moderate aortic   stenosis:Vmax is 2.2 m/s, BRE 1.2 cm2, MG 12 mmHg, DI 0.5.  Unable to estimate pulmonary artery pressure, normal estimated right   atrial pressure.     Compared to prior Echo - 12/18/19, no significant change.    Echo 12-18-19  No prior study is available for comparison.   Left ventricular ejection fraction is visually estimated to be 65%.  Mild concentric left ventricular hypertrophy.  Mildly dilated left atrium.  Calcified aortic valve leaflets, " rheumatic appearing.  Mild to moderate aortic stenosis, visually appears moderate: Vmax is    2.9 m/s, MG 20 mmHg, BRE 1.4 cm2.  Trace tricuspid regurgitation.  Estimated right ventricular systolic pressure  is 30 mmHg.     Most recent labs:     Lab Results   Component Value Date/Time    HEMOGLOBIN 12.0 (L) 07/17/2020 07:35 AM    HEMATOCRIT 35.8 (L) 07/17/2020 07:35 AM    .5 (H) 07/17/2020 07:35 AM    INR 1.02 07/17/2020 07:35 AM    TSH 3.320 08/15/2019 04:18 AM      Lab Results   Component Value Date/Time    SODIUM 139 07/17/2020 07:35 AM    POTASSIUM 4.5 07/17/2020 07:35 AM    CHLORIDE 105 07/17/2020 07:35 AM    CO2 22 07/17/2020 07:35 AM    GLUCOSE 97 07/17/2020 07:35 AM    BUN 19 07/17/2020 07:35 AM    CREATININE 0.77 07/17/2020 07:35 AM      Lab Results   Component Value Date/Time    ASTSGOT 16 07/17/2020 07:35 AM    ALTSGPT 18 07/17/2020 07:35 AM    ALBUMIN 4.0 07/17/2020 07:35 AM      Lab Results   Component Value Date/Time    CHOLSTRLTOT 184 08/15/2019 04:18 AM     (H) 08/15/2019 04:18 AM    HDL 43 08/15/2019 04:18 AM    TRIGLYCERIDE 145 08/15/2019 04:18 AM       8-15-19 H 14.9, p 141, na 140, k 4.5, cr 1.12, lfts normal, ldl 112, hdl 43, tg 145, tc 184, TSH 3.32    Past Medical History:   Diagnosis Date   • AV block, complete (HCC) 07/2020    Status post pacemaker placement   • Heart murmur     went away once in the [employer] 1961   • History of chickenpox    • History of measles    • History of mumps    • History of rheumatic fever    • Hypertension    • Pacemaker 07/2020   • Paroxysmal atrial fibrillation (HCC)    • Rheumatic fever 1957     Past Surgical History:   Procedure Laterality Date   • PB REVISE MEDIAN N/CARPAL TUNNEL SURG Left 12/3/2020    Procedure: LEFT CARPAL TUNNEL RELEASE, LEFT GANGLION CYST EXCISION;  Surgeon: Emiliano Dawkins M.D.;  Location: SURGERY [place];  Service: Orthopedics   • PACEMAKER INSERTION Left 07/17/2020    [employer] Accolade MRI L311 implanted by   [facility]scar.   • PB RECONSTR TOTAL SHOULDER IMPLANT Right 2020    Procedure: RT ARTHROPLASTY, SHOULDER, TOTAL;  Surgeon: Arsenio Hernandez M.D.;  Location: SURGERY [facility];  Service: Orthopedics   • OTHER      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   • OTHER ORTHOPEDIC SURGERY      bilat RCR     Family History   Problem Relation Age of Onset   • Cancer Mother         Breast,  at 62   • Stroke Father         CVA age 67     Social History     Socioeconomic History   • Marital status:      Spouse name: Not on file   • Number of children: Not on file   • Years of education: Not on file   • Highest education level: Not on file   Occupational History   • Not on file   Tobacco Use   • Smoking status: Current Some Day Smoker     Packs/day: 0.30     Types: Cigarettes     Start date:    • Smokeless tobacco: Never Used   Vaping Use   • Vaping Use: Never used   Substance and Sexual Activity   • Alcohol use: Yes     Alcohol/week: 1.8 oz     Types: 1 Cans of beer, 2 Shots of liquor per week   • Drug use: No   • Sexual activity: Not on file   Other Topics Concern   • Not on file   Social History Narrative   • Not on file     Social Determinants of Health     Financial Resource Strain:    • Difficulty of Paying Living Expenses:    Food Insecurity:    • Worried About Running Out of Food in the Last Year:    • Ran Out of Food in the Last Year:    Transportation Needs:    • Lack of Transportation (Medical):    • Lack of Transportation (Non-Medical):    Physical Activity:    • Days of Exercise per Week:    • Minutes of Exercise per Session:    Stress:    • Feeling of Stress :    Social Connections:    • Frequency of Communication with Friends and Family:    • Frequency of Social Gatherings with Friends and Family:    • Attends [employer] Services:    • Active Member of Clubs or Organizations:    • Attends Club or Organization Meetings:    • Marital Status:    Intimate Partner Violence:    • Fear of Current or Ex-Partner:    •  "Emotionally Abused:    • Physically Abused:    • Sexually Abused:      Allergies   Allergen Reactions   • Typhoid Vaccines        Current Outpatient Medications   Medication Sig Dispense Refill   • ELIQUIS 5 MG Tab TAKE 1 TABLET TWICE A  tablet 2   • hydrocortisone-pramoxine (PERIANAL) 1-1 % Foam Apply to hemorrhoids every 12 hours as needed 10 g 2   • lisinopril (PRINIVIL) 2.5 MG Tab Take 1 tablet by mouth every day. 90 tablet 1   • acetaminophen (TYLENOL 8 HOUR ARTHRITIS PAIN) 650 MG CR tablet Take 1 Tab by mouth every 6 hours as needed for up to 90 doses. 30 Tab 0   • multivitamin (THERAGRAN) Tab Take 1 Tab by mouth every day.       No current facility-administered medications for this visit.       ROS    All others systems reviewed and negative.     Objective:     /70 (BP Location: Left arm, Patient Position: Sitting, BP Cuff Size: Adult)   Pulse 64   Resp 16   Ht 1.778 m (5' 10\")   Wt 94.3 kg (208 lb)   SpO2 95%  Body mass index is 29.84 kg/m².    Physical Exam   General: No acute distress. Well nourished.  HEENT: EOM grossly intact, no scleral icterus, no pharyngeal erythema.   Neck:  No JVD, no bruits, trachea midline  CVS: RRR,3/6 mid peaking sys murmur R, LLSB, No LE edema.  2+ radial pulses, 2+ PT pulses, PM pocket in tact  Resp: CTAB. No wheezing or crackles/rhonchi. Normal respiratory effort.  Abdomen: Soft, NT, no juliette hepatomegaly.  MSK/Ext: No clubbing or cyanosis. *LUE edema, erythema*  Skin: Warm and dry, no rashes. Bruise over right chest  Neurological: CN III-XII grossly intact. No focal deficits. Resting tremor.  Psych: A&O x 3, appropriate affect, good judgement    Physical exam performed today and unchanged, except what is noted, compared to 10-    Assessment:     1. Rheumatic aortic stenosis     2. Paroxysmal atrial fibrillation (HCC)     3. AV block, complete (HCC)     4. Cardiac pacemaker in situ     5. Chronic anticoagulation     6. Essential hypertension     7. " Tobacco use     8. Pure hypercholesterolemia     9. LVH (left ventricular hypertrophy)     10. Other fatigue     11. FRANCHESCA (obstructive sleep apnea)     12. Nonrheumatic aortic valve stenosis         Medical Decision Making:  Today's Assessment / Status / Plan:     -PM checks in [facility] paced 98%, EF ok   -PAF, [id number] of 3, no prior TIA/CVA.  -BP is better now that he is sleeping.  -Untreated FRANCHESCA, didn't like CPAP  -He has very mild HLP, no meds yet  -Repeat echo 2 year  AS,   -RTC 6 months    Return in about 6 months (around 12/2/2021).    It is my pleasure to participate in the care of Mr. Bowling.  Please do not hesitate to contact me with questions or concerns.    Hayley Barker MD, [facility]  Cardiologist [facility]    Please note that this dictation was created using voice recognition software. I have made every reasonable attempt to correct obvious errors, but it is possible there are errors of grammar and possibly content that I did not discover before finalizing the note.

## 2021-06-02 NOTE — LETTER
[facility]   [address] [place] [address]  Phone: [phone]  Fax: [phone]              Jacobo Bowling  1945    Encounter Date: 6/2/2021    Hayley Barker M.D.          PROGRESS NOTE:  Subjective:   Chief Complaint:   Chief Complaint   Patient presents with   • Aortic Stenosis     Jacobo Bowling is a 75 y.o. male who returns for mild to mod AS, prior rheumatic heart disease, symptomatic bradycardia, paroxysmal atrial fibrillation fatigue, HTN with LVH, transmission once for NS VT.    At age 11, had rheumatic fever, joint pain, could not walk, then heart murmur same time, was in the hospital for weeks. Then murmur went away, was in the [employer] for many years. They did extensive testing, did not find anything wrong.  Our echo 12-19 and  with mild to mod AS.    Has HTN with mild LVH on echo 2019, I did not see LVH on 2020 echo, on low dose meds, doing better, BP at home cuff is elevated compared to ours.  His BP often in the low 130s over 70s.    Also has untreated FRANCHESCA, was given mask but didn't feel he could wear it.    Considers himself to not be smoking anymore, just a few puffs, does not inhale.    Has mild HLP, .    [employer] pacemaker placed with Dr. Cantrell on 7/17/2020 for symptomatic slow Afib, occasional mode switches but getting paced a lot.  Starting to feel better.  On Apixaban for [id number] of 3, no prior TIA/CVA.  He never felt better but V paced 98%, A paced 56%.    Has remote transmission 16 seconds nonsustained VT March 2021. Doing ok.  No BB.    He is not limited by chest pain, pressure or tightness with activity.   No significant dyspnea on exertion, orthopnea or lower extremity swelling.    Left eye operation, some balance problems.  No symptoms of leg claudication.   No stroke/TIA like symptoms.  His balance feels off a bit after eye surgery.  Has noted from fatigue, slowing  "down.    He is having some fatigue.  No presyncope/syncope.    Has slight intention tremor, does not bother him. We discussed propranolol but he is not needing this.     No family history of premature coronary artery disease.  Father had CVA around age 67.  Mother  at 62 of breast CA.  No siblings.   No history of diabetes.  No history of autoimmune disease such as lupus or rheumatoid arthritis.  No chronic kidney disease.  Rare ETOH.    LUE edema after wrist surgery.    From [facility], mother [place]ian, Father Polish, fought with Gamb[place] resistance, he was conceived during the , moved to the [facility] in , age 8. Lived in [place], until the [employer] after high school. Retired from [place], [place], moved to [facility], then [place], was too hot, landed here, lived in [place].  Raised 2 daughters in [place] and [place].  One daughter in [place], one in [place]. Son-in-law Principal at [employer].    Wife is Amy (goes by \"Kenneth,\" does not like Amy), not here today, in good health,  44 years. They met in [facility], she is from [place].  Served in the [employer].    DATA REVIEWED by me:  ECG   Probably afib, 40 though cannot exclude SSS with high grade heart block    ECG 2020  Sinus bradycardia, wenckebach, 41, heart block    ECG 19  Sinus, 56, first degree AV delay.    PM check 21  A paced 56, V paced 98%, 3 episodes, 0.6, <1%.    PM 2020  A paced 60%, V 98%, no mode switch    Pacemaker placement 2020 Dr. Cantrell  Zigfu device with RA and RV lead    Echo 12-3-2020  Left ventricular ejection fraction is visually estimated to be 55-60%.  Grade II diastolic dysfunction.  Pacer/ICD wire seen in right ventricle.  Moderately dilated left atrium.  Calcified aortic valve leaflets with mild to moderate aortic   stenosis:Vmax is 2.2 m/s, BRE 1.2 cm2, MG 12 mmHg, DI 0.5.  Unable to estimate pulmonary artery pressure, normal estimated right   atrial pressure.     Compared to prior " Echo - 12/18/19, no significant change.    Echo 12-18-19  No prior study is available for comparison.   Left ventricular ejection fraction is visually estimated to be 65%.  Mild concentric left ventricular hypertrophy.  Mildly dilated left atrium.  Calcified aortic valve leaflets, rheumatic appearing.  Mild to moderate aortic stenosis, visually appears moderate: Vmax is    2.9 m/s, MG 20 mmHg, BRE 1.4 cm2.  Trace tricuspid regurgitation.  Estimated right ventricular systolic pressure  is 30 mmHg.     Most recent labs:     Lab Results   Component Value Date/Time    HEMOGLOBIN 12.0 (L) 07/17/2020 07:35 AM    HEMATOCRIT 35.8 (L) 07/17/2020 07:35 AM    .5 (H) 07/17/2020 07:35 AM    INR 1.02 07/17/2020 07:35 AM    TSH 3.320 08/15/2019 04:18 AM      Lab Results   Component Value Date/Time    SODIUM 139 07/17/2020 07:35 AM    POTASSIUM 4.5 07/17/2020 07:35 AM    CHLORIDE 105 07/17/2020 07:35 AM    CO2 22 07/17/2020 07:35 AM    GLUCOSE 97 07/17/2020 07:35 AM    BUN 19 07/17/2020 07:35 AM    CREATININE 0.77 07/17/2020 07:35 AM      Lab Results   Component Value Date/Time    ASTSGOT 16 07/17/2020 07:35 AM    ALTSGPT 18 07/17/2020 07:35 AM    ALBUMIN 4.0 07/17/2020 07:35 AM      Lab Results   Component Value Date/Time    CHOLSTRLTOT 184 08/15/2019 04:18 AM     (H) 08/15/2019 04:18 AM    HDL 43 08/15/2019 04:18 AM    TRIGLYCERIDE 145 08/15/2019 04:18 AM       8-15-19 H 14.9, p 141, na 140, k 4.5, cr 1.12, lfts normal, ldl 112, hdl 43, tg 145, tc 184, TSH 3.32    Past Medical History:   Diagnosis Date   • AV block, complete (HCC) 07/2020    Status post pacemaker placement   • Heart murmur     went away once in the [employer] 1961   • History of chickenpox    • History of measles    • History of mumps    • History of rheumatic fever    • Hypertension    • Pacemaker 07/2020   • Paroxysmal atrial fibrillation (HCC)    • Rheumatic fever 1957     Past Surgical History:   Procedure Laterality Date   • PB REVISE MEDIAN N/CARPAL  TUNNEL SURG Left 12/3/2020    Procedure: LEFT CARPAL TUNNEL RELEASE, LEFT GANGLION CYST EXCISION;  Surgeon: Emiliano Dawkins M.D.;  Location: SURGERY [facility];  Service: Orthopedics   • PACEMAKER INSERTION Left 2020    [employer] Accolade MRI L311 implanted by Dr. Cantrell.   • PB RECONSTR TOTAL SHOULDER IMPLANT Right 2020    Procedure: RT ARTHROPLASTY, SHOULDER, TOTAL;  Surgeon: Arsenio Hernandez M.D.;  Location: SURGERY [facility];  Service: Orthopedics   • OTHER      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   • OTHER ORTHOPEDIC SURGERY      bilat RCR     Family History   Problem Relation Age of Onset   • Cancer Mother         Breast,  at 62   • Stroke Father         CVA age 67     Social History     Socioeconomic History   • Marital status:      Spouse name: Not on file   • Number of children: Not on file   • Years of education: Not on file   • Highest education level: Not on file   Occupational History   • Not on file   Tobacco Use   • Smoking status: Current Some Day Smoker     Packs/day: 0.30     Types: Cigarettes     Start date:    • Smokeless tobacco: Never Used   Vaping Use   • Vaping Use: Never used   Substance and Sexual Activity   • Alcohol use: Yes     Alcohol/week: 1.8 oz     Types: 1 Cans of beer, 2 Shots of liquor per week   • Drug use: No   • Sexual activity: Not on file   Other Topics Concern   • Not on file   Social History Narrative   • Not on file     Social Determinants of Health     Financial Resource Strain:    • Difficulty of Paying Living Expenses:    Food Insecurity:    • Worried About Running Out of Food in the Last Year:    • Ran Out of Food in the Last Year:    Transportation Needs:    • Lack of Transportation (Medical):    • Lack of Transportation (Non-Medical):    Physical Activity:    • Days of Exercise per Week:    • Minutes of Exercise per Session:    Stress:    • Feeling of Stress :    Social Connections:    • Frequency of Communication with Friends and  "Family:    • Frequency of Social Gatherings with Friends and Family:    • Attends Christian Services:    • Active Member of Clubs or Organizations:    • Attends Club or Organization Meetings:    • Marital Status:    Intimate Partner Violence:    • Fear of Current or Ex-Partner:    • Emotionally Abused:    • Physically Abused:    • Sexually Abused:      Allergies   Allergen Reactions   • Typhoid Vaccines        Current Outpatient Medications   Medication Sig Dispense Refill   • ELIQUIS 5 MG Tab TAKE 1 TABLET TWICE A  tablet 2   • hydrocortisone-pramoxine (PERIANAL) 1-1 % Foam Apply to hemorrhoids every 12 hours as needed 10 g 2   • lisinopril (PRINIVIL) 2.5 MG Tab Take 1 tablet by mouth every day. 90 tablet 1   • acetaminophen (TYLENOL 8 HOUR ARTHRITIS PAIN) 650 MG CR tablet Take 1 Tab by mouth every 6 hours as needed for up to 90 doses. 30 Tab 0   • multivitamin (THERAGRAN) Tab Take 1 Tab by mouth every day.       No current facility-administered medications for this visit.       ROS    All others systems reviewed and negative.     Objective:     /70 (BP Location: Left arm, Patient Position: Sitting, BP Cuff Size: Adult)   Pulse 64   Resp 16   Ht 1.778 m (5' 10\")   Wt 94.3 kg (208 lb)   SpO2 95%  Body mass index is 29.84 kg/m².    Physical Exam   General: No acute distress. Well nourished.  HEENT: EOM grossly intact, no scleral icterus, no pharyngeal erythema.   Neck:  No JVD, no bruits, trachea midline  CVS: RRR,3/6 mid peaking sys murmur R, LLSB, No LE edema.  2+ radial pulses, 2+ PT pulses, PM pocket in tact  Resp: CTAB. No wheezing or crackles/rhonchi. Normal respiratory effort.  Abdomen: Soft, NT, no juliette hepatomegaly.  MSK/Ext: No clubbing or cyanosis. *LUE edema, erythema*  Skin: Warm and dry, no rashes. Bruise over right chest  Neurological: CN III-XII grossly intact. No focal deficits. Resting tremor.  Psych: A&O x 3, appropriate affect, good judgement    Physical exam performed today and " unchanged, except what is noted, compared to 10-    Assessment:     1. Rheumatic aortic stenosis     2. Paroxysmal atrial fibrillation (HCC)     3. AV block, complete (HCC)     4. Cardiac pacemaker in situ     5. Chronic anticoagulation     6. Essential hypertension     7. Tobacco use     8. Pure hypercholesterolemia     9. LVH (left ventricular hypertrophy)     10. Other fatigue     11. FRANCHESCA (obstructive sleep apnea)     12. Nonrheumatic aortic valve stenosis         Medical Decision Making:  Today's Assessment / Status / Plan:     -PM checks in [facility], V paced 98%, EF ok   -PAF, [id number] of 3, no prior TIA/CVA.  -BP is better now that he is sleeping.  -Untreated FRANCHESCA, didn't like CPAP  -He has very mild HLP, no meds yet  -Repeat echo 2 year  AS,   -RTC 6 months    Return in about 6 months (around 12/2/2021).    It is my pleasure to participate in the care of Mr. Bowling.  Please do not hesitate to contact me with questions or concerns.    Hayley Barker MD, [facility]  Cardiologist [facility]    Please note that this dictation was created using voice recognition software. I have made every reasonable attempt to correct obvious errors, but it is possible there are errors of grammar and possibly content that I did not discover before finalizing the note.        Han Cavazos, P.A.-C.  [address]  [place] [address]  Via In Basket

## 2021-06-02 NOTE — PROGRESS NOTES
Patient had left wrist surgery in December 2020, and in the last month, has had left arm swelling. He is being followed by Dr. Charles. He is taking his Eliquis.    Device is working normally. 3 mode switching episodes (0.6 minutes total, <1% of total time); he is anticoagulated.  Normal sensing of RA lead; unable to measure R waves. Stable capture of RA and RV leads; stable impedances. Battery longevity is 7 years.  No changes are made today.    He does see Dr. Barker today too.    FU in 6 months for next PM check with me.

## 2021-07-26 ENCOUNTER — TELEPHONE (OUTPATIENT)
Dept: CARDIOLOGY | Facility: MEDICAL CENTER | Age: 76
End: 2021-07-26

## 2021-07-26 NOTE — TELEPHONE ENCOUNTER
LS    Patient has an MRI ordered. He needs to know with his pace maker if he can get an MRI. Please advise. He is waiting to be scheduled.    Thank you,    Aida YOO

## 2021-07-27 NOTE — TELEPHONE ENCOUNTER
Device Team,    Can one of you please confirm if this patient's device is MRI compatible or not?    Thank You,  [place]

## 2021-07-27 NOTE — TELEPHONE ENCOUNTER
Renée Quezada, Med Ass't; Priya Philippe, Med Ass't; Constance Luther, Med Ass't  Caller: Unspecified (Yesterday,  9:55 AM)  His device is MRI-compatible including the lead system.   Thanks    JH

## 2021-08-09 NOTE — TELEPHONE ENCOUNTER
Patient called and stated he has his MRI scheduled for 8-17 at [facility]. He wants to make sure he is good to go. He states he was told to call Priya once MRI scheduled. Please advise.    Thank you,    Aida YOO

## 2021-08-11 NOTE — TELEPHONE ENCOUNTER
Advised patient nothing further needs to be done at this time.  Confirmed [employer] rep aware of MRI @ [facility].

## 2021-08-23 DIAGNOSIS — I10 ESSENTIAL HYPERTENSION: ICD-10-CM

## 2021-08-24 RX ORDER — LISINOPRIL 2.5 MG/1
2.5 TABLET ORAL DAILY
Qty: 90 TABLET | Refills: 3 | Status: SHIPPED | OUTPATIENT
Start: 2021-08-24 | End: 2022-08-23

## 2021-09-02 ENCOUNTER — TELEPHONE (OUTPATIENT)
Dept: CARDIOLOGY | Facility: MEDICAL CENTER | Age: 76
End: 2021-09-02

## 2021-09-02 NOTE — TELEPHONE ENCOUNTER
RODRIGUEZ    Pt had a MRI at [facility] on 8/17. Pt states before the MRI his heart rate was 60 and after the MRI his heart rate has consistently been at 70. Please call Pt back at [phone].    Thank you

## 2021-09-02 NOTE — TELEPHONE ENCOUNTER
Returned call. Pt is concerned b/c he has a PM and his HR used to be 60 before his MRI. During his MRI settings had to be changed. However, after the MRI his HR is now 70 and he is not sure if this ok. He does not know why it was not changed back. Pt denies any symptoms or issues. Informed the pt that I would seek advisement from our device clinic, but he may need a PM check.

## 2021-09-02 NOTE — TELEPHONE ENCOUNTER
Renée Cortez  You; Constance Luther, Med Ass't; Priya Philippe, Med Ass't 2 hours ago (12:34 PM)     As of remote transmission 8- pt is in AF since 8-. Lower rate defaults to 70 ppm during AF. Please have pt retransmit today to recheck rhythm.   Thx     Message text    You  Constance Luther, Med Ass't; Priya Philippe, Med Ass't; Renée Cortez 2 hours ago (11:54 AM)     Hi again, does this pt need a PM check or is the new setting of 70 ok?     Routing comment        Called pt. S/w his wife. Informed her of the above. Pt is not home right now but she will have him re-transmit once he is. She is wondering if HR will go back to 60 if pt goes back into SR. Informed her I would ask the device clinic and let them know when we call back after second transmission is received.

## 2021-09-08 NOTE — TELEPHONE ENCOUNTER
Pt called stating he re-transmitted 15 minutes ago and wanted to make sure it went through. Please call Pt back at [phone].    Thank you

## 2021-09-22 NOTE — TELEPHONE ENCOUNTER
Called pt to f/u w/ him. S/w his wife Amy as pt is not home. Informed Amy that transmissions were received and reiterated that his PM sets to a HR of 70 when in AFL. Advised that they call in if pt begins to experience symptoms.

## 2021-09-23 ENCOUNTER — TELEPHONE (OUTPATIENT)
Dept: HEALTH INFORMATION MANAGEMENT | Facility: OTHER | Age: 76
End: 2021-09-23

## 2021-09-23 NOTE — TELEPHONE ENCOUNTER
Outcome: Left Message to scheduled annual with pcp    Please transfer to Patient Outreach Team at [phone] when patient returns call.        Attempt #1

## 2021-10-07 NOTE — TELEPHONE ENCOUNTER
Outcome: Left Message with his wife chandrakant to retun our call to [facility] annual with pcp     Please transfer to Patient Outreach Team at [phone] when patient returns call.      Attempt #2

## 2021-10-19 PROBLEM — D69.6 THROMBOCYTOPENIA (HCC): Status: ACTIVE | Noted: 2021-10-19

## 2021-12-14 NOTE — PROGRESS NOTES
Subjective:   Chief Complaint:   Chief Complaint   Patient presents with   • Hypertension   • Atrial Fibrillation     Jacobo Bowling is a 76 y.o. male who returns for mild to mod AS, prior rheumatic heart disease, symptomatic bradycardia, paroxysmal atrial fibrillation, fatigue, HTN with LVH, transmission once for NS VT.    At age 11, had rheumatic fever, joint pain, could not walk, then heart murmur same time, was in the hospital for weeks. Then murmur went away, was in the [employer] for many years. They did extensive testing, did not find anything wrong.  Our echo 12-19 and  with mild to mod AS.    Has HTN with mild LVH on echo 2019, I did not see LVH on 2020 echo, on low dose meds, doing better, BP at home cuff is elevated compared to ours.  His BP often in the low 130s over 70s.    Also has untreated FRANCHESCA, was given mask but didn't feel he could wear it.    Considers himself to not be smoking anymore, just a few puffs, does not inhale.    Has mild HLP, .    [employer] pacemaker placed with Dr. Cantrell on 7/17/2020 for symptomatic slow Afib, occasional mode switches but getting paced a lot, has had symptoms substantial episodes of A. fib but he does not feel them because he remains to be paced during those times.    On Apixaban for [id number] of 3, no prior TIA/CVA.    Has remote transmission 16 seconds nonsustained VT March 2021. Doing ok.  No BB.    Limited by hip weakness, pain.  He is not limited by chest pain, pressure or tightness with activity.   No significant dyspnea on exertion, orthopnea or lower extremity swelling.    Left eye operation, some balance problems.  No symptoms of leg claudication.   No stroke/TIA like symptoms.  His balance feels off a bit after eye surgery.  Has noted from fatigue, slowing down.    He is having some fatigue.  No presyncope/syncope.    Has slight intention tremor, does not bother him. We discussed propranolol but he is not needing this.     No family history  "of premature coronary artery disease.  Father had CVA around age 67.  Mother  at 62 of breast CA.  No siblings.   No history of diabetes.  No history of autoimmune disease such as lupus or rheumatoid arthritis.  No chronic kidney disease.  Rare ETOH.    LUE edema after wrist surgery.    From [facility], mother [place], Father Polish, fought with Malagasy resistance, he was conceived during the , moved to the [facility] in , age 8. [place]. Retired from [place], [place], moved to [facility], then [place], was too hot, landed here, lived in [place].  Raised 2 daughters in [place] and [place].  One daughter in [place], one in [place]. Son-in-law Principal at [employer].    Wife is Amy (goes by \"Kenneth,\" does not like Amy), not here today, in good health,  44 years. They met in [facility], she is from [place].  Served in the [employer].    DATA REVIEWED by me:  ECG   Probably afib, 40 though cannot exclude SSS with high grade heart block    ECG 2020  Sinus bradycardia, wenckebach, 41, heart block    ECG 19  Sinus, 56, first degree AV delay.    PM check 21  A paced 56, V paced 98%, 3 episodes, 0.6, <1%.    PM 2020  A paced 60%, V 98%, no mode switch    Pacemaker placement 2020 Dr. Cantrell  [facility] device with RA and RV lead    Echo 12-3-2020  Left ventricular ejection fraction is visually estimated to be 55-60%.  Grade II diastolic dysfunction.  Pacer/ICD wire seen in right ventricle.  Moderately dilated left atrium.  Calcified aortic valve leaflets with mild to moderate aortic   stenosis:Vmax is 2.2 m/s, BRE 1.2 cm2, MG 12 mmHg, DI 0.5.  Unable to estimate pulmonary artery pressure, normal estimated right   atrial pressure.     Compared to prior Echo - 19, no significant change.    Echo 19  No prior study is available for comparison.   Left ventricular ejection fraction is visually estimated to be 65%.  Mild concentric " left ventricular hypertrophy.  Mildly dilated left atrium.  Calcified aortic valve leaflets, rheumatic appearing.  Mild to moderate aortic stenosis, visually appears moderate: Vmax is    2.9 m/s, MG 20 mmHg, BRE 1.4 cm2.  Trace tricuspid regurgitation.  Estimated right ventricular systolic pressure  is 30 mmHg.     Most recent labs:     Lab Results   Component Value Date/Time    HEMOGLOBIN 13.0 (L) 10/07/2021 02:45 PM    HEMATOCRIT 38.7 (L) 10/07/2021 02:45 PM    .6 (H) 10/07/2021 02:45 PM    INR 1.02 07/17/2020 07:35 AM    TSH 3.320 08/15/2019 04:18 AM      Lab Results   Component Value Date/Time    SODIUM 139 10/07/2021 02:45 PM    POTASSIUM 4.5 10/07/2021 02:45 PM    CHLORIDE 106 10/07/2021 02:45 PM    CO2 24 10/07/2021 02:45 PM    GLUCOSE 131 (H) 10/07/2021 02:45 PM    BUN 23 (H) 10/07/2021 02:45 PM    CREATININE 1.3 10/07/2021 02:45 PM      Lab Results   Component Value Date/Time    ASTSGOT 30 10/07/2021 02:45 PM    ALTSGPT 28 10/07/2021 02:45 PM    ALBUMIN 3.8 10/07/2021 02:45 PM      Lab Results   Component Value Date/Time    CHOLSTRLTOT 184 08/15/2019 04:18 AM     (H) 08/15/2019 04:18 AM    HDL 43 08/15/2019 04:18 AM    TRIGLYCERIDE 145 08/15/2019 04:18 AM       8-15-19 H 14.9, p 141, na 140, k 4.5, cr 1.12, lfts normal, ldl 112, hdl 43, tg 145, tc 184, TSH 3.32    Past Medical History:   Diagnosis Date   • Arthritis    • At risk for sleep apnea    • AV block, complete (HCC) 07/2020    Status post pacemaker placement   • Heart murmur     1957 detected with murmur, then gone now say slight murmur present   • History of chickenpox    • History of measles    • History of mumps    • History of rheumatic fever    • Hypertension     managed with med   • Pacemaker 07/2020    left chest wall , [employer]   • Paroxysmal atrial fibrillation (HCC)    • Rheumatic fever 1957   • Snoring      Past Surgical History:   Procedure Laterality Date   • PB EXCIS TENDON SHEATH LESION, HAND/FINGER Left  2021    Procedure: EXCISION BIOPSY LEFT WRIST WITH CULTURES;  Surgeon: Emiliano Dawkins M.D.;  Location: [facility];  Service: Orthopedics   • PB REVISE MEDIAN N/CARPAL TUNNEL SURG Left 12/3/2020    Procedure: LEFT CARPAL TUNNEL RELEASE, LEFT GANGLION CYST EXCISION;  Surgeon: Emiliano Dawkins M.D.;  Location: [facility];  Service: Orthopedics   • PACEMAKER INSERTION Left 2020    [employer] Accolade MRI L311 implanted by Dr. Cantrell.   • PB RECONSTR TOTAL SHOULDER IMPLANT Right 2020    Procedure: RT ARTHROPLASTY, SHOULDER, TOTAL;  Surgeon: Arsenio Hernandez M.D.;  Location: [facility];  Service: Orthopedics   • OTHER      left eye surgery    • OTHER ORTHOPEDIC SURGERY      bilat RCR, shoulders, carpal tunnel surgery     Family History   Problem Relation Age of Onset   • Cancer Mother         Breast,  at 62   • Stroke Father         CVA age 67     Social History     Socioeconomic History   • Marital status:      Spouse name: Not on file   • Number of children: Not on file   • Years of education: Not on file   • Highest education level: Not on file   Occupational History   • Not on file   Tobacco Use   • Smoking status: Current Some Day Smoker     Packs/day: 0.30     Types: Cigarettes     Start date:    • Smokeless tobacco: Never Used   • Tobacco comment: 5or 6 daily   Vaping Use   • Vaping Use: Never used   Substance and Sexual Activity   • Alcohol use: Yes     Alcohol/week: 1.8 oz     Types: 1 Cans of beer, 2 Shots of liquor per week     Comment: weekly, beer or vodka   • Drug use: No   • Sexual activity: Not on file   Other Topics Concern   • Not on file   Social History Narrative   • Not on file     Social Determinants of Health     Financial Resource Strain:    • Difficulty of Paying Living Expenses: Not on file   Food Insecurity:    • Worried About Running Out of Food in the Last Year: Not on file   • Ran Out of Food in the Last Year: Not on file  "  Transportation Needs:    • Lack of Transportation (Medical): Not on file   • Lack of Transportation (Non-Medical): Not on file   Physical Activity:    • Days of Exercise per Week: Not on file   • Minutes of Exercise per Session: Not on file   Stress:    • Feeling of Stress : Not on file   Social Connections:    • Frequency of Communication with Friends and Family: Not on file   • Frequency of Social Gatherings with Friends and Family: Not on file   • Attends Protestant Services: Not on file   • Active Member of Clubs or Organizations: Not on file   • Attends Club or Organization Meetings: Not on file   • Marital Status: Not on file   Intimate Partner Violence:    • Fear of Current or Ex-Partner: Not on file   • Emotionally Abused: Not on file   • Physically Abused: Not on file   • Sexually Abused: Not on file   Housing Stability:    • Unable to Pay for Housing in the Last Year: Not on file   • Number of Places Lived in the Last Year: Not on file   • Unstable Housing in the Last Year: Not on file     Allergies   Allergen Reactions   • Typhoid Vaccines        Current Outpatient Medications   Medication Sig Dispense Refill   • Calcium Carbonate Antacid (TUMS PO) Take  by mouth.     • ELIQUIS 5 MG Tab TAKE 1 TABLET BY MOUTH TWICE DAILY 180 Tablet 3   • lisinopril (PRINIVIL) 2.5 MG Tab TAKE 1 TABLET BY MOUTH EVERY DAY 90 Tablet 3   • acetaminophen (TYLENOL 8 HOUR ARTHRITIS PAIN) 650 MG CR tablet Take 1 tablet by mouth every 6 hours as needed for up to 90 doses. 30 tablet 0   • multivitamin (THERAGRAN) Tab Take 1 Tab by mouth every day.       No current facility-administered medications for this visit.       ROS    All others systems reviewed and negative.     Objective:     /70 (BP Location: Left arm, Patient Position: Sitting, BP Cuff Size: Adult)   Pulse 60   Resp 16   Ht 1.778 m (5' 10\")   Wt 97.5 kg (215 lb)   SpO2 97%  Body mass index is 30.85 kg/m².    Physical Exam   General: No acute distress. Well " nourished.  HEENT: EOM grossly intact, no scleral icterus, no pharyngeal erythema.   Neck:  No JVD, no bruits, trachea midline  CVS: RRR,3/6 mid peaking sys murmur R, LLSB, No LE edema.  2+ radial pulses, 2+ PT pulses, PM pocket in tact  Resp: CTAB. No wheezing or crackles/rhonchi. Normal respiratory effort.  Abdomen: Soft, NT, no juliette hepatomegaly.  MSK/Ext: No clubbing or cyanosis. *LUE edema, erythema*  Skin: Warm and dry, no rashes. Bruise over right chest  Neurological: CN III-XII grossly intact. No focal deficits. Resting tremor.  Psych: A&O x 3, appropriate affect, good judgement    Physical exam performed today and unchanged, except what is noted, compared to 6-2-21    Assessment:     1. Cardiac pacemaker in situ     2. AV block, complete (HCC)     3. Chronic anticoagulation     4. Essential hypertension     5. Rheumatic aortic stenosis     6. Tobacco use     7. Pure hypercholesterolemia     8. Other fatigue     9. LVH (left ventricular hypertrophy)     10. FRANCHESCA (obstructive sleep apnea)     11. Persistent atrial fibrillation (HCC)     12. Atherosclerosis of native artery of both lower extremities with intermittent claudication (HCC)  US-YOUSUF MULTI LEVEL BILAT       Medical Decision Making:  Today's Assessment / Status / Plan:     -PM checks in [place], HARPAL paced 98%, EF ok   -PAF, no symptoms [id number] of 3, no prior TIA/CVA.  -BP is better now that he is sleeping.  -Untreated FRANCHESCA, didn't like CPAP  -He has very mild HLP, no meds yet  -Repeat echo spring 2022  -Buttock pain could be lumbar stenosis, could also be PAD but unusual presentation, ABIs  -RTC 6 months    Written instructions given today:      Please look into the following diets:    Mediterranean diet.  Pritikin - [facility]  Ornish Diet   Vegan diet (proven to reverse vascular disease)    Resources to learn more:  American Heart Association   Www.Cardiosmart.org, sponsored by the American College of cardiology.    *YOUSUF  blood pressure test to evaluate the blood flow to your legs      Return in about 6 months (around 6/15/2022).    It is my pleasure to participate in the care of Mr. Bowling.  Please do not hesitate to contact me with questions or concerns.    Hayley Barker MD, [facility]  Cardiologist [facility]    Please note that this dictation was created using voice recognition software. I have made every reasonable attempt to correct obvious errors, but it is possible there are errors of grammar and possibly content that I did not discover before finalizing the note.

## 2021-12-15 ENCOUNTER — OFFICE VISIT (OUTPATIENT)
Dept: CARDIOLOGY | Facility: PHYSICIAN GROUP | Age: 76
End: 2021-12-15
Payer: MEDICARE

## 2021-12-15 ENCOUNTER — TELEPHONE (OUTPATIENT)
Dept: CARDIOLOGY | Facility: MEDICAL CENTER | Age: 76
End: 2021-12-15

## 2021-12-15 ENCOUNTER — NON-PROVIDER VISIT (OUTPATIENT)
Dept: CARDIOLOGY | Facility: PHYSICIAN GROUP | Age: 76
End: 2021-12-15
Payer: MEDICARE

## 2021-12-15 VITALS
DIASTOLIC BLOOD PRESSURE: 70 MMHG | HEIGHT: 70 IN | OXYGEN SATURATION: 97 % | WEIGHT: 215 LBS | BODY MASS INDEX: 30.78 KG/M2 | SYSTOLIC BLOOD PRESSURE: 128 MMHG | HEART RATE: 60 BPM | RESPIRATION RATE: 16 BRPM

## 2021-12-15 VITALS
WEIGHT: 215 LBS | HEART RATE: 60 BPM | DIASTOLIC BLOOD PRESSURE: 70 MMHG | HEIGHT: 70 IN | SYSTOLIC BLOOD PRESSURE: 128 MMHG | BODY MASS INDEX: 30.78 KG/M2 | OXYGEN SATURATION: 97 % | RESPIRATION RATE: 16 BRPM

## 2021-12-15 DIAGNOSIS — Z72.0 TOBACCO USE: ICD-10-CM

## 2021-12-15 DIAGNOSIS — R53.83 OTHER FATIGUE: ICD-10-CM

## 2021-12-15 DIAGNOSIS — Z95.0 CARDIAC PACEMAKER IN SITU: ICD-10-CM

## 2021-12-15 DIAGNOSIS — I06.0 RHEUMATIC AORTIC STENOSIS: ICD-10-CM

## 2021-12-15 DIAGNOSIS — E78.00 PURE HYPERCHOLESTEROLEMIA: ICD-10-CM

## 2021-12-15 DIAGNOSIS — I35.0 NONRHEUMATIC AORTIC VALVE STENOSIS: ICD-10-CM

## 2021-12-15 DIAGNOSIS — I48.19 PERSISTENT ATRIAL FIBRILLATION (HCC): ICD-10-CM

## 2021-12-15 DIAGNOSIS — I70.213 ATHEROSCLEROSIS OF NATIVE ARTERY OF BOTH LOWER EXTREMITIES WITH INTERMITTENT CLAUDICATION (HCC): ICD-10-CM

## 2021-12-15 DIAGNOSIS — I44.2 AV BLOCK, COMPLETE (HCC): ICD-10-CM

## 2021-12-15 DIAGNOSIS — G47.33 OSA (OBSTRUCTIVE SLEEP APNEA): ICD-10-CM

## 2021-12-15 DIAGNOSIS — I48.0 PAROXYSMAL ATRIAL FIBRILLATION (HCC): ICD-10-CM

## 2021-12-15 DIAGNOSIS — I51.7 LVH (LEFT VENTRICULAR HYPERTROPHY): ICD-10-CM

## 2021-12-15 DIAGNOSIS — I10 ESSENTIAL HYPERTENSION: ICD-10-CM

## 2021-12-15 DIAGNOSIS — Z79.01 CHRONIC ANTICOAGULATION: ICD-10-CM

## 2021-12-15 PROCEDURE — 93280 PM DEVICE PROGR EVAL DUAL: CPT | Performed by: NURSE PRACTITIONER

## 2021-12-15 PROCEDURE — 99214 OFFICE O/P EST MOD 30 MIN: CPT | Mod: 25 | Performed by: INTERNAL MEDICINE

## 2021-12-15 ASSESSMENT — FIBROSIS 4 INDEX
FIB4 SCORE: 3.6
FIB4 SCORE: 3.6

## 2021-12-15 NOTE — PATIENT INSTRUCTIONS
Please look into the following diets:    Mediterranean diet.  [place] - [facility]  Ornish Diet   Vegan diet (proven to reverse vascular disease)    Resources to learn more:  American Heart Association   Www.Cardiosmart.org, sponsored by the American College of cardiology.    *YOUSUF blood pressure test to evaluate the blood flow to your legs

## 2021-12-15 NOTE — PROGRESS NOTES
Device is working normally. 3 mode switching episodes (2 episode of 48+ hours, 26% of total time), completely asymptomatic; he is anticoagulated with Eliquis.  Normal sensing of RA lead, unable to measure R waves. Stable capture of RA and RV leads; stable impedances. Battery longevity is 6.5 years.  No changes are made today.    He does see Dr. Barker today too.    FU in 6 months for next PM check with me.

## 2021-12-15 NOTE — LETTER
[facility]   [address]  [place] [address]  Phone: [phone]  Fax: [phone]              Jacobo Bowling  1945    Encounter Date: 12/15/2021    Hayley Barker M.D.          PROGRESS NOTE:  Subjective:   Chief Complaint:   Chief Complaint   Patient presents with   • Hypertension   • Atrial Fibrillation     Jacobo Bowling is a 76 y.o. male who returns for mild to mod AS, prior rheumatic heart disease, symptomatic bradycardia, paroxysmal atrial fibrillation, fatigue, HTN with LVH, transmission once for NS VT.    At age 11, had rheumatic fever, joint pain, could not walk, then heart murmur same time, was in the hospital for weeks. Then murmur went away, was in the [employer] for many years. They did extensive testing, did not find anything wrong.  Our echo 12-19 and  with mild to mod AS.    Has HTN with mild LVH on echo 2019, I did not see LVH on 2020 echo, on low dose meds, doing better, BP at home cuff is elevated compared to ours.  His BP often in the low 130s over 70s.    Also has untreated FRANCHESCA, was given mask but didn't feel he could wear it.    Considers himself to not be smoking anymore, just a few puffs, does not inhale.    Has mild HLP, .    [employer] pacemaker placed with Dr. Cantrell on 7/17/2020 for symptomatic slow Afib, occasional mode switches but getting paced a lot, has had symptoms substantial episodes of A. fib but he does not feel them because he remains to be paced during those times.    On Apixaban for [id number] of 3, no prior TIA/CVA.    Has remote transmission 16 seconds nonsustained VT March 2021. Doing ok.  No BB.    Limited by hip weakness, pain.  He is not limited by chest pain, pressure or tightness with activity.   No significant dyspnea on exertion, orthopnea or lower extremity swelling.    Left eye operation, some balance problems.  No symptoms of leg claudication.   No  "stroke/TIA like symptoms.  His balance feels off a bit after eye surgery.  Has noted from fatigue, slowing down.    He is having some fatigue.  No presyncope/syncope.    Has slight intention tremor, does not bother him. We discussed propranolol but he is not needing this.     No family history of premature coronary artery disease.  Father had CVA around age 67.  Mother  at 62 of breast CA.  No siblings.   No history of diabetes.  No history of autoimmune disease such as lupus or rheumatoid arthritis.  No chronic kidney disease.  Rare ETOH.    LUE edema after wrist surgery.    From [facility], mother Mexican, Father Polish, fought with Bengali resistance, he was conceived during the , moved to the [facility] in , age 8. Lived in [place], until the [employer] after high school. Retired from [place], [place], moved to [facility], then [place], was too hot, landed here, lived in [place].  Raised 2 daughters in [place] and [place].  One daughter in [place], one in [place]. Son-in-law Principal at [employer].    Wife is Amy (goes by \"Kenneth,\" does not like Amy), not here today, in good health,  44 years. They met in [facility], she is from [place].  Served in the [employer].    DATA REVIEWED by me:  ECG   Probably afib, 40 though cannot exclude SSS with high grade heart block    ECG 2020  Sinus bradycardia, wenckebach, 41, heart block    ECG 19  Sinus, 56, first degree AV delay.    PM check 21  A paced 56, V paced 98%, 3 episodes, 0.6, <1%.    PM 2020  A paced 60%, V 98%, no mode switch    Pacemaker placement 2020 Dr. Cantrell  GIVINGtrax device with RA and RV lead    Echo 12-3-2020  Left ventricular ejection fraction is visually estimated to be 55-60%.  Grade II diastolic dysfunction.  Pacer/ICD wire seen in right ventricle.  Moderately dilated left atrium.  Calcified aortic valve leaflets with mild to moderate aortic   stenosis:Vmax is 2.2 m/s, BRE 1.2 cm2, MG 12 mmHg, DI " 0.5.  Unable to estimate pulmonary artery pressure, normal estimated right   atrial pressure.     Compared to prior Echo - 12/18/19, no significant change.    Echo 12-18-19  No prior study is available for comparison.   Left ventricular ejection fraction is visually estimated to be 65%.  Mild concentric left ventricular hypertrophy.  Mildly dilated left atrium.  Calcified aortic valve leaflets, rheumatic appearing.  Mild to moderate aortic stenosis, visually appears moderate: Vmax is    2.9 m/s, MG 20 mmHg, BRE 1.4 cm2.  Trace tricuspid regurgitation.  Estimated right ventricular systolic pressure  is 30 mmHg.     Most recent labs:     Lab Results   Component Value Date/Time    HEMOGLOBIN 13.0 (L) 10/07/2021 02:45 PM    HEMATOCRIT 38.7 (L) 10/07/2021 02:45 PM    .6 (H) 10/07/2021 02:45 PM    INR 1.02 07/17/2020 07:35 AM    TSH 3.320 08/15/2019 04:18 AM      Lab Results   Component Value Date/Time    SODIUM 139 10/07/2021 02:45 PM    POTASSIUM 4.5 10/07/2021 02:45 PM    CHLORIDE 106 10/07/2021 02:45 PM    CO2 24 10/07/2021 02:45 PM    GLUCOSE 131 (H) 10/07/2021 02:45 PM    BUN 23 (H) 10/07/2021 02:45 PM    CREATININE 1.3 10/07/2021 02:45 PM      Lab Results   Component Value Date/Time    ASTSGOT 30 10/07/2021 02:45 PM    ALTSGPT 28 10/07/2021 02:45 PM    ALBUMIN 3.8 10/07/2021 02:45 PM      Lab Results   Component Value Date/Time    CHOLSTRLTOT 184 08/15/2019 04:18 AM     (H) 08/15/2019 04:18 AM    HDL 43 08/15/2019 04:18 AM    TRIGLYCERIDE 145 08/15/2019 04:18 AM       8-15-19 H 14.9, p 141, na 140, k 4.5, cr 1.12, lfts normal, ldl 112, hdl 43, tg 145, tc 184, TSH 3.32    Past Medical History:   Diagnosis Date   • Arthritis    • At risk for sleep apnea    • AV block, complete (HCC) 07/2020    Status post pacemaker placement   • Heart murmur     1957 detected with murmur, then gone now say slight murmur present   • History of chickenpox    • History of measles    • History of mumps    • History of  rheumatic fever    • Hypertension     managed with med   • Pacemaker 2020    left chest wall , [employer]   • Paroxysmal atrial fibrillation (HCC)    • Rheumatic fever    • Snoring      Past Surgical History:   Procedure Laterality Date   • PB EXCIS TENDON SHEATH LESION, HAND/FINGER Left 2021    Procedure: EXCISION BIOPSY LEFT WRIST WITH CULTURES;  Surgeon: Emiliano Dawkins M.D.;  Location: [facility];  Service: Orthopedics   • PB REVISE MEDIAN N/CARPAL TUNNEL SURG Left 12/3/2020    Procedure: LEFT CARPAL TUNNEL RELEASE, LEFT GANGLION CYST EXCISION;  Surgeon: Emiliano Dawkins M.D.;  Location: [facility];  Service: Orthopedics   • PACEMAKER INSERTION Left 2020    [employer] Accolade MRI L311 implanted by Dr. Cantrell.   • PB RECONSTR TOTAL SHOULDER IMPLANT Right 2020    Procedure: RT ARTHROPLASTY, SHOULDER, TOTAL;  Surgeon: Arsenio Hernandez M.D.;  Location: SURGERY [facility];  Service: Orthopedics   • OTHER      left eye surgery    • OTHER ORTHOPEDIC SURGERY      bilat RCR, shoulders, carpal tunnel surgery     Family History   Problem Relation Age of Onset   • Cancer Mother         Breast,  at 62   • Stroke Father         CVA age 67     Social History     Socioeconomic History   • Marital status:      Spouse name: Not on file   • Number of children: Not on file   • Years of education: Not on file   • Highest education level: Not on file   Occupational History   • Not on file   Tobacco Use   • Smoking status: Current Some Day Smoker     Packs/day: 0.30     Types: Cigarettes     Start date:    • Smokeless tobacco: Never Used   • Tobacco comment: 5or 6 daily   Vaping Use   • Vaping Use: Never used   Substance and Sexual Activity   • Alcohol use: Yes     Alcohol/week: 1.8 oz     Types: 1 Cans of beer, 2 Shots of liquor per week     Comment: weekly, beer or vodka   • Drug use: No   • Sexual activity: Not on file   Other Topics Concern   • Not on file    Social History Narrative   • Not on file     Social Determinants of Health     Financial Resource Strain:    • Difficulty of Paying Living Expenses: Not on file   Food Insecurity:    • Worried About Running Out of Food in the Last Year: Not on file   • Ran Out of Food in the Last Year: Not on file   Transportation Needs:    • Lack of Transportation (Medical): Not on file   • Lack of Transportation (Non-Medical): Not on file   Physical Activity:    • Days of Exercise per Week: Not on file   • Minutes of Exercise per Session: Not on file   Stress:    • Feeling of Stress : Not on file   Social Connections:    • Frequency of Communication with Friends and Family: Not on file   • Frequency of Social Gatherings with Friends and Family: Not on file   • Attends Worship Services: Not on file   • Active Member of Clubs or Organizations: Not on file   • Attends Club or Organization Meetings: Not on file   • Marital Status: Not on file   Intimate Partner Violence:    • Fear of Current or Ex-Partner: Not on file   • Emotionally Abused: Not on file   • Physically Abused: Not on file   • Sexually Abused: Not on file   Housing Stability:    • Unable to Pay for Housing in the Last Year: Not on file   • Number of Places Lived in the Last Year: Not on file   • Unstable Housing in the Last Year: Not on file     Allergies   Allergen Reactions   • Typhoid Vaccines        Current Outpatient Medications   Medication Sig Dispense Refill   • Calcium Carbonate Antacid (TUMS PO) Take  by mouth.     • ELIQUIS 5 MG Tab TAKE 1 TABLET BY MOUTH TWICE DAILY 180 Tablet 3   • lisinopril (PRINIVIL) 2.5 MG Tab TAKE 1 TABLET BY MOUTH EVERY DAY 90 Tablet 3   • acetaminophen (TYLENOL 8 HOUR ARTHRITIS PAIN) 650 MG CR tablet Take 1 tablet by mouth every 6 hours as needed for up to 90 doses. 30 tablet 0   • multivitamin (THERAGRAN) Tab Take 1 Tab by mouth every day.       No current facility-administered medications for this visit.       ROS    All others  "systems reviewed and negative.     Objective:     /70 (BP Location: Left arm, Patient Position: Sitting, BP Cuff Size: Adult)   Pulse 60   Resp 16   Ht 1.778 m (5' 10\")   Wt 97.5 kg (215 lb)   SpO2 97%  Body mass index is 30.85 kg/m².    Physical Exam   General: No acute distress. Well nourished.  HEENT: EOM grossly intact, no scleral icterus, no pharyngeal erythema.   Neck:  No JVD, no bruits, trachea midline  CVS: RRR,3/6 mid peaking sys murmur R, LLSB, No LE edema.  2+ radial pulses, 2+ PT pulses, PM pocket in tact  Resp: CTAB. No wheezing or crackles/rhonchi. Normal respiratory effort.  Abdomen: Soft, NT, no juliette hepatomegaly.  MSK/Ext: No clubbing or cyanosis. *LUE edema, erythema*  Skin: Warm and dry, no rashes. Bruise over right chest  Neurological: CN III-XII grossly intact. No focal deficits. Resting tremor.  Psych: A&O x 3, appropriate affect, good judgement    Physical exam performed today and unchanged, except what is noted, compared to 6-2-21    Assessment:     1. Cardiac pacemaker in situ     2. AV block, complete (HCC)     3. Chronic anticoagulation     4. Essential hypertension     5. Rheumatic aortic stenosis     6. Tobacco use     7. Pure hypercholesterolemia     8. Other fatigue     9. LVH (left ventricular hypertrophy)     10. FRANCHESCA (obstructive sleep apnea)     11. Persistent atrial fibrillation (HCC)     12. Atherosclerosis of native artery of both lower extremities with intermittent claudication (HCC)  US-YOUSUF MULTI LEVEL BILAT       Medical Decision Making:  Today's Assessment / Status / Plan:     -PM checks in Thadde[place], V paced 98%, EF ok   -PAF, no symptoms [id number] of 3, no prior TIA/CVA.  -BP is better now that he is sleeping.  -Untreated FRANCHESCA, didn't like CPAP  -He has very mild HLP, no meds yet  -Repeat echo spring 2022  -Buttock pain could be lumbar stenosis, could also be PAD but unusual presentation, ABIs  -RTC 6 months    Written instructions given today:      Please " look into the following diets:    Mediterranean diet.  [facility]  Ornish Diet   Vegan diet (proven to reverse vascular disease)    Resources to learn more:  American Heart Association   Www.Cardiosmart.org, sponsored by the American College of cardiology.    *YOUSUF blood pressure test to evaluate the blood flow to your legs      Return in about 6 months (around 6/15/2022).    It is my pleasure to participate in the care of Mr. Bowling.  Please do not hesitate to contact me with questions or concerns.    Hayley Barker MD, [facility]  Cardiologist [facility]    Please note that this dictation was created using voice recognition software. I have made every reasonable attempt to correct obvious errors, but it is possible there are errors of grammar and possibly content that I did not discover before finalizing the note.        Han Cavazos, P.A.-C.  [address]  [place] [address]  Via In Basket

## 2021-12-15 NOTE — TELEPHONE ENCOUNTER
Echo order  Received: Today  FLY Beverly LS said he needed an Echo. Can you put in an order please?   --------------------------------------------------------------------------------------------------------    Echo order placed.

## 2022-03-31 ENCOUNTER — HOSPITAL ENCOUNTER (OUTPATIENT)
Dept: RADIOLOGY | Facility: MEDICAL CENTER | Age: 77
End: 2022-03-31
Attending: INTERNAL MEDICINE
Payer: MEDICARE

## 2022-03-31 ENCOUNTER — HOSPITAL ENCOUNTER (OUTPATIENT)
Dept: CARDIOLOGY | Facility: MEDICAL CENTER | Age: 77
End: 2022-03-31
Attending: INTERNAL MEDICINE
Payer: MEDICARE

## 2022-03-31 DIAGNOSIS — I51.7 LVH (LEFT VENTRICULAR HYPERTROPHY): ICD-10-CM

## 2022-03-31 DIAGNOSIS — I48.0 PAROXYSMAL ATRIAL FIBRILLATION (HCC): ICD-10-CM

## 2022-03-31 DIAGNOSIS — I35.0 NONRHEUMATIC AORTIC VALVE STENOSIS: ICD-10-CM

## 2022-03-31 DIAGNOSIS — I70.213 ATHEROSCLEROSIS OF NATIVE ARTERY OF BOTH LOWER EXTREMITIES WITH INTERMITTENT CLAUDICATION (HCC): ICD-10-CM

## 2022-03-31 LAB
LV EJECT FRACT  99904: 70
LV EJECT FRACT MOD 2C 99903: 62.56
LV EJECT FRACT MOD 4C 99902: 67.24
LV EJECT FRACT MOD BP 99901: 65.05

## 2022-03-31 PROCEDURE — 93306 TTE W/DOPPLER COMPLETE: CPT

## 2022-03-31 PROCEDURE — 93922 UPR/L XTREMITY ART 2 LEVELS: CPT | Mod: 26 | Performed by: INTERNAL MEDICINE

## 2022-03-31 PROCEDURE — 93306 TTE W/DOPPLER COMPLETE: CPT | Mod: 26 | Performed by: INTERNAL MEDICINE

## 2022-03-31 PROCEDURE — 93922 UPR/L XTREMITY ART 2 LEVELS: CPT

## 2022-05-12 ENCOUNTER — TELEPHONE (OUTPATIENT)
Dept: MEDICAL GROUP | Facility: PHYSICIAN GROUP | Age: 77
End: 2022-05-12
Payer: MEDICARE

## 2022-05-12 NOTE — TELEPHONE ENCOUNTER
Patient stopped by [facility] office with some questions regarding a new medication the was prescribed to him by PCP.     Patient was just prescribed Celebrix 200mg and wanted to make sure that there would be no interaction with his Eliquis 5mg or Lisinopril 2.5mg tablets.     He is requesting a call at [phone] he advised that it would be okay to leave a detailed message on his answering machine in the event that no one picks up.

## 2022-05-13 ENCOUNTER — TELEPHONE (OUTPATIENT)
Dept: CARDIOLOGY | Facility: MEDICAL CENTER | Age: 77
End: 2022-05-13
Payer: MEDICARE

## 2022-05-13 NOTE — TELEPHONE ENCOUNTER
Hayley Barker M.D.  You 12 hours ago (7:14 PM)       Janee,   Please let him know that Celebrex is less associated with GI bleeding.  If patients need to be on nonsteroidal anti-inflammatories, it is the one we prefer.  Would be okay for him to take with his other medications.  It can be a little hard on the kidneys and raise blood pressure.   Please let him know, thank you.    Message text       [employer] message sent.

## 2022-05-13 NOTE — TELEPHONE ENCOUNTER
RODRIGUEZ    Caller: Jacobo  Topic/issue: Jacobo would like to know if he is able to take celecoxib (CELEBREX) 200 MG Cap [      Callback Number: [phone]  Thank you,  Soha IZQUIERDO

## 2022-06-22 ENCOUNTER — NON-PROVIDER VISIT (OUTPATIENT)
Dept: CARDIOLOGY | Facility: PHYSICIAN GROUP | Age: 77
End: 2022-06-22
Payer: MEDICARE

## 2022-06-22 ENCOUNTER — OFFICE VISIT (OUTPATIENT)
Dept: CARDIOLOGY | Facility: PHYSICIAN GROUP | Age: 77
End: 2022-06-22
Payer: MEDICARE

## 2022-06-22 VITALS
WEIGHT: 213 LBS | HEART RATE: 61 BPM | DIASTOLIC BLOOD PRESSURE: 70 MMHG | HEIGHT: 70 IN | SYSTOLIC BLOOD PRESSURE: 136 MMHG | BODY MASS INDEX: 30.49 KG/M2 | RESPIRATION RATE: 16 BRPM | OXYGEN SATURATION: 96 %

## 2022-06-22 VITALS
HEIGHT: 70 IN | OXYGEN SATURATION: 96 % | SYSTOLIC BLOOD PRESSURE: 136 MMHG | HEART RATE: 61 BPM | RESPIRATION RATE: 16 BRPM | WEIGHT: 213 LBS | DIASTOLIC BLOOD PRESSURE: 70 MMHG | BODY MASS INDEX: 30.49 KG/M2

## 2022-06-22 DIAGNOSIS — Z79.01 CHRONIC ANTICOAGULATION: ICD-10-CM

## 2022-06-22 DIAGNOSIS — I06.0 RHEUMATIC AORTIC STENOSIS: ICD-10-CM

## 2022-06-22 DIAGNOSIS — Z95.0 CARDIAC PACEMAKER IN SITU: ICD-10-CM

## 2022-06-22 DIAGNOSIS — I48.0 PAROXYSMAL ATRIAL FIBRILLATION (HCC): ICD-10-CM

## 2022-06-22 DIAGNOSIS — I10 HYPERTENSION, UNSPECIFIED TYPE: ICD-10-CM

## 2022-06-22 DIAGNOSIS — R53.83 OTHER FATIGUE: ICD-10-CM

## 2022-06-22 DIAGNOSIS — E78.00 PURE HYPERCHOLESTEROLEMIA: ICD-10-CM

## 2022-06-22 DIAGNOSIS — E78.2 MIXED HYPERLIPIDEMIA: ICD-10-CM

## 2022-06-22 DIAGNOSIS — I44.2 AV BLOCK, COMPLETE (HCC): ICD-10-CM

## 2022-06-22 PROBLEM — E78.5 HYPERLIPIDEMIA: Status: ACTIVE | Noted: 2022-06-22

## 2022-06-22 PROCEDURE — 99214 OFFICE O/P EST MOD 30 MIN: CPT | Performed by: NURSE PRACTITIONER

## 2022-06-22 PROCEDURE — 93280 PM DEVICE PROGR EVAL DUAL: CPT | Performed by: NURSE PRACTITIONER

## 2022-06-22 ASSESSMENT — ENCOUNTER SYMPTOMS
HEADACHES: 0
NAUSEA: 0
ORTHOPNEA: 0
ABDOMINAL PAIN: 0
PALPITATIONS: 0
COUGH: 0
LOSS OF CONSCIOUSNESS: 0
CHILLS: 0
MYALGIAS: 0
NERVOUS/ANXIOUS: 1
DIZZINESS: 0
BRUISES/BLEEDS EASILY: 0
SHORTNESS OF BREATH: 0
FEVER: 0
PND: 0

## 2022-06-22 ASSESSMENT — FIBROSIS 4 INDEX
FIB4 SCORE: 3.6
FIB4 SCORE: 3.6

## 2022-06-22 NOTE — PROGRESS NOTES
Chief Complaint   Patient presents with   • Follow-Up   • Pacemaker Check/Dysfunction   • AV Block Complete   • Atrial Fibrillation   • Anticoagulation   • HTN (Controlled)   • Aortic Stenosis       Subjective     Jacobo Bowling is a 76 y.o. male who presents today for six month follow-up for PM check, AV block, PAFib, anticoagulation, mild AS and HTN.    Jacobo is a 76 year old male with history of high AV block with PM since July 2020, PAFib, anticoagulation with Eliquis, mild AS (with history of rheumatic fever as a teen), hypertension and FRANCHESCA (not treated).    He is here today for six month follow-up. Generally, he is doing well, but he admits to more anxiety, as he thinks more about his mortality. No chest pain, pressure or discomfort; no palpitations; no shortness of breath, orthopnea or PND; no dizziness or syncope; no LE edema. He does have some sciatica, and periodically takes Celebrex, with good relief.    Past Medical History:   Diagnosis Date   • Arthritis    • At risk for sleep apnea    • AV block, complete (HCC) 07/2020    Status post pacemaker placement   • Chronic anticoagulation    • Heart murmur     1957 detected with murmur, then gone now say slight murmur present   • History of chickenpox    • History of measles    • History of mumps    • History of rheumatic fever    • Hypertension    • Pacemaker 07/2020    left chest wall , [employer]   • Paroxysmal atrial fibrillation (HCC)    • Rheumatic aortic stenosis 03/2022    Echocardiogram with normal LV size, LVEF 65-70%. Grade II diastolic dysfunction. Normal RA and RV. Mildly dilated LA. Mild MR. Mild AS (Vmax 2.7m/s, peak 30mmHg, mean 15mmHg), mild TR. RVSP 39mmHg.   • Rheumatic fever 1957   • Snoring      Past Surgical History:   Procedure Laterality Date   • PB EXCIS TENDON SHEATH LESION, HAND/FINGER Left 7/8/2021    Procedure: EXCISION BIOPSY LEFT WRIST WITH CULTURES;  Surgeon: Emiliano Dawkins M.D.;  Location: SURGERY [facility];   Service: Orthopedics   • PB REVISE MEDIAN N/CARPAL TUNNEL SURG Left 12/3/2020    Procedure: LEFT CARPAL TUNNEL RELEASE, LEFT GANGLION CYST EXCISION;  Surgeon: Emiliano Dawkins M.D.;  Location: SUR[facility];  Service: Orthopedics   • PACEMAKER INSERTION Left 2020    [employer] Accolade MRI L311 implanted by Dr. Cantrell.   • PB RECONSTR TOTAL SHOULDER IMPLANT Right 2020    Procedure: RT ARTHROPLASTY, SHOULDER, TOTAL;  Surgeon: Arsenio Hernandez M.D.;  Location: SURGERY [facility];  Service: Orthopedics   • OTHER      left eye surgery    • OTHER ORTHOPEDIC SURGERY      bilat RCR, shoulders, carpal tunnel surgery     Family History   Problem Relation Age of Onset   • Cancer Mother         Breast,  at 62   • Stroke Father         CVA age 67     Social History     Socioeconomic History   • Marital status:      Spouse name: Not on file   • Number of children: Not on file   • Years of education: Not on file   • Highest education level: Not on file   Occupational History   • Not on file   Tobacco Use   • Smoking status: Current Some Day Smoker     Packs/day: 0.30     Types: Cigarettes     Start date:    • Smokeless tobacco: Never Used   • Tobacco comment: 5or 6 daily   Vaping Use   • Vaping Use: Never used   Substance and Sexual Activity   • Alcohol use: Yes     Alcohol/week: 1.8 oz     Types: 1 Cans of beer, 2 Shots of liquor per week     Comment: weekly, beer or vodka   • Drug use: No   • Sexual activity: Not on file   Other Topics Concern   • Not on file   Social History Narrative   • Not on file     Social Determinants of Health     Financial Resource Strain: Not on file   Food Insecurity: Not on file   Transportation Needs: Not on file   Physical Activity: Not on file   Stress: Not on file   Social Connections: Not on file   Intimate Partner Violence: Not on file   Housing Stability: Not on file     Allergies   Allergen Reactions   • Typhoid Vaccines      Outpatient Encounter  "Medications as of 6/22/2022   Medication Sig Dispense Refill   • celecoxib (CELEBREX) 200 MG Cap Take 1 capsule daily 30 Capsule 2   • hydrocortisone rectal (PERIANAL) 2.5% Cream Apply small amount to anus daily 30 g 5   • methylPREDNISolone (MEDROL DOSEPAK) 4 MG Tablet Therapy Pack Follow package directions 1 Each 0   • hydrocortisone-pramoxine (PERIANAL) 1-1 % Foam Apply to hemorrhoids every 12 hours as needed 10 g 5   • ELIQUIS 5 MG Tab TAKE 1 TABLET BY MOUTH TWICE DAILY 180 Tablet 3   • lisinopril (PRINIVIL) 2.5 MG Tab TAKE 1 TABLET BY MOUTH EVERY DAY 90 Tablet 3   • multivitamin (THERAGRAN) Tab Take 1 Tab by mouth every day.       No facility-administered encounter medications on file as of 6/22/2022.     Review of Systems   Constitutional: Negative for chills and fever.   HENT: Negative for congestion.    Respiratory: Negative for cough and shortness of breath.    Cardiovascular: Negative for chest pain, palpitations, orthopnea, leg swelling and PND.   Gastrointestinal: Negative for abdominal pain and nausea.   Musculoskeletal: Negative for myalgias.   Skin: Negative for rash.   Neurological: Negative for dizziness, loss of consciousness and headaches.   Endo/Heme/Allergies: Does not bruise/bleed easily.   Psychiatric/Behavioral: The patient is nervous/anxious.               Objective     /70 (BP Location: Left arm, Patient Position: Sitting, BP Cuff Size: Adult)   Pulse 61   Resp 16   Ht 1.778 m (5' 10\")   Wt 96.6 kg (213 lb)   SpO2 96%   BMI 30.56 kg/m²     Physical Exam  Constitutional:       Appearance: He is well-developed.   HENT:      Head: Normocephalic.   Neck:      Vascular: No JVD.   Cardiovascular:      Rate and Rhythm: Normal rate and regular rhythm.      Heart sounds: Normal heart sounds.      Comments: PM in left chest wall.  Pulmonary:      Effort: Pulmonary effort is normal. No respiratory distress.      Breath sounds: Normal breath sounds. No wheezing or rales.   Abdominal:      " General: Bowel sounds are normal. There is no distension.      Palpations: Abdomen is soft.      Tenderness: There is no abdominal tenderness.   Musculoskeletal:         General: Normal range of motion.      Cervical back: Normal range of motion and neck supple.   Skin:     General: Skin is warm and dry.      Findings: No rash.   Neurological:      Mental Status: He is alert and oriented to person, place, and time.     PM Interrogation today reveals normal function. 3 mode switching episodes (longest episode almost 5 hours). No changes are made today.    CONCLUSIONS OF ECHOCARDIOGRAM OF 3/31/2022:  The left ventricular ejection fraction is visually estimated to be 65-  70%.  Pacer/ICD wire seen in the right ventricle.  Calcified aortic valve leaflets with mild aortic stenosis: Vmax is 2.7   m/s, MG 15 mmHg, BRE 2 cm2.  Mild tricuspid regurgitation.  Right ventricular systolic pressure is estimated to be 39 mmHg.  Compared to the prior echo on 12/02/2020, no significant change, aortic   stenosis appears mild.    CONCLUSIONS OF ECHOCARDIOGRAM OF 12/2/2020:  Left ventricular ejection fraction is visually estimated to be 55-60%.  Grade II diastolic dysfunction.  Pacer/ICD wire seen in right ventricle.  Moderately dilated left atrium.  Calcified aortic valve leaflets with mild to moderate aortic   stenosis:Vmax is 2.2 m/s, BRE 1.2 cm2, MG 12 mmHg, DI 0.5.  Unable to estimate pulmonary artery pressure, normal estimated right   atrial pressure.  Compared to prior Echo - 12/18/19, no significant change.     Lab Results   Component Value Date/Time    CHOLSTRLTOT 184 08/15/2019 04:18 AM     (H) 08/15/2019 04:18 AM    HDL 43 08/15/2019 04:18 AM    TRIGLYCERIDE 145 08/15/2019 04:18 AM       Lab Results   Component Value Date/Time    SODIUM 139 10/07/2021 02:45 PM    POTASSIUM 4.5 10/07/2021 02:45 PM    CHLORIDE 106 10/07/2021 02:45 PM    CO2 24 10/07/2021 02:45 PM    GLUCOSE 131 (H) 10/07/2021 02:45 PM    BUN 23 (H)  10/07/2021 02:45 PM    CREATININE 1.3 10/07/2021 02:45 PM    BUNCREATRAT 13 01/24/2020 06:50 AM     Lab Results   Component Value Date/Time    ALKPHOSPHAT 53 10/07/2021 02:45 PM    ASTSGOT 30 10/07/2021 02:45 PM    ALTSGPT 28 10/07/2021 02:45 PM    TBILIRUBIN 0.5 10/07/2021 02:45 PM         Assessment & Plan     1. Cardiac pacemaker in situ     2. AV block, complete (HCC)     3. Paroxysmal atrial fibrillation (HCC)     4. Chronic anticoagulation     5. Hypertension, unspecified type     6. Mixed hyperlipidemia     7. Rheumatic aortic stenosis         Medical Decision Making: Today's Assessment/Status/Plan:      1. High AV block, with PAFib, with PPM, which is working normally. 3 mode switching episodes, no changes are made today.    2. Chronic anticoagulation with Eliquis. No bleeding problems. Urged to limit Celebrex to 1-2 times per week, so as not to increase bleeding risk.    3. Hypertension, treated with low dose Lisinopril, stable. BP is well controlled.    4. Hyperlipidemia, not currently on any therapy. To repeat fasting CMP and lipid panel.    5. Mild aortic stenosis, stable on echo. Will monitor annually.    6. Anxiety/fatigue, to check TSH.    He is doing well. Same medications for now. Labs as above. Follow-up in 6 months for PM check, follow-up sooner if clinical condition changes.

## 2022-07-06 ENCOUNTER — TELEPHONE (OUTPATIENT)
Dept: CARDIOLOGY | Facility: MEDICAL CENTER | Age: 77
End: 2022-07-06
Payer: MEDICARE

## 2022-07-06 DIAGNOSIS — I10 HYPERTENSION, UNSPECIFIED TYPE: ICD-10-CM

## 2022-07-06 DIAGNOSIS — I70.213 ATHEROSCLEROSIS OF NATIVE ARTERY OF BOTH LOWER EXTREMITIES WITH INTERMITTENT CLAUDICATION (HCC): ICD-10-CM

## 2022-07-06 DIAGNOSIS — E78.2 MIXED HYPERLIPIDEMIA: ICD-10-CM

## 2022-07-06 RX ORDER — ATORVASTATIN CALCIUM 20 MG/1
20 TABLET, FILM COATED ORAL EVERY EVENING
Qty: 90 TABLET | Refills: 3 | Status: SHIPPED | OUTPATIENT
Start: 2022-07-06 | End: 2022-12-28

## 2022-07-06 NOTE — TELEPHONE ENCOUNTER
----- Message from DEBBIE River sent at 7/6/2022 12:26 PM PDT -----  Labs are OK - lipids have gone up somewhat. There was coronary atherosclerosis seen in CT scan of chest in June 2021, so really would recommend treatment.  Plus, his 10 year ASCVD risk score is 36.50%.  Would recommend Lipitor 20mg once daily. If he is agreeable, suggest repeat labs in 1 month (CMP and lipid panel).  Thanks, AB

## 2022-07-06 NOTE — TELEPHONE ENCOUNTER
Called pt [phone]  Relayed lab results and recommendations per AB. Pt in agreement. Pharmacy verified. All questions regarding side effects vs adverse effects answered. Home address verified. Pt verbalized understanding.         Repeat labs mailed to home address       atorvastatin (LIPITOR) 20 MG Tab 90 Tablet 3/3 7/6/2022     Sig - Route: Take 1 Tablet by mouth every evening. - Oral    Sent to pharmacy as: Atorvastatin Calcium 20 MG Oral Tablet (LIPITOR)    Cosign for Ordering: Required by DEBBIE River    E-Prescribing Status: Receipt confirmed by pharmacy (7/6/2022  3:57 PM PDT)    Pharmacy  [facility] #[id number] [address] AT [facility] [address] & [place]

## 2022-07-18 ENCOUNTER — NON-PROVIDER VISIT (OUTPATIENT)
Dept: CARDIOLOGY | Facility: MEDICAL CENTER | Age: 77
End: 2022-07-18
Payer: MEDICARE

## 2022-07-18 PROCEDURE — 93294 REM INTERROG EVL PM/LDLS PM: CPT | Performed by: INTERNAL MEDICINE

## 2022-07-18 NOTE — CARDIAC REMOTE MONITOR - SCAN
Device transmission reviewed. Device demonstrated appropriate function.       Electronically Signed by: Shyanne Grey M.D.    7/27/2022  8:02 AM

## 2022-07-29 DIAGNOSIS — Z79.01 CHRONIC ANTICOAGULATION: ICD-10-CM

## 2022-07-29 RX ORDER — APIXABAN 5 MG/1
TABLET, FILM COATED ORAL
Qty: 180 TABLET | Refills: 3 | Status: SHIPPED | OUTPATIENT
Start: 2022-07-29 | End: 2023-06-14 | Stop reason: SDUPTHER

## 2022-07-29 NOTE — TELEPHONE ENCOUNTER
Is the patient due for a refill? Yes, requesting rx to be sent to different pharmacy.    Was the patient seen the past year? Yes    Date of last office visit: 06/22/2022    Does the patient have an upcoming appointment?  Yes   If yes, When? 12/28/2022    Provider to refill:AB    Does the patients insurance require a 100 day supply?  No

## 2022-10-17 ENCOUNTER — NON-PROVIDER VISIT (OUTPATIENT)
Dept: CARDIOLOGY | Facility: MEDICAL CENTER | Age: 77
End: 2022-10-17
Payer: MEDICARE

## 2022-10-17 PROCEDURE — 93294 REM INTERROG EVL PM/LDLS PM: CPT | Performed by: INTERNAL MEDICINE

## 2022-10-17 NOTE — CARDIAC REMOTE MONITOR - SCAN
Device transmission reviewed. Device demonstrated appropriate function.       Electronically Signed by: Erich Dubon M.D.    10/17/2022  7:05 PM

## 2022-11-08 ENCOUNTER — PATIENT MESSAGE (OUTPATIENT)
Dept: HEALTH INFORMATION MANAGEMENT | Facility: OTHER | Age: 77
End: 2022-11-08

## 2022-12-22 ENCOUNTER — TELEPHONE (OUTPATIENT)
Dept: CARDIOLOGY | Facility: MEDICAL CENTER | Age: 77
End: 2022-12-22
Payer: MEDICARE

## 2022-12-22 NOTE — TELEPHONE ENCOUNTER
Called patient regarding lab work previously ordered by AB. Spoke to patient's wife Amy, who stated that patient hasn't done lab work. Amy requested lab slips be mailed to patient's mailing address. Confirmed mailing address. Amy stated that if patient does not receive lab slips within the next few days, patient will call office to have lab slips faxed somewhere near [place]. Gave Amy office phone number. Confirmed with Amy pt's upcoming appt date and time with AB.

## 2022-12-23 ENCOUNTER — TELEPHONE (OUTPATIENT)
Dept: CARDIOLOGY | Facility: MEDICAL CENTER | Age: 77
End: 2022-12-23
Payer: MEDICARE

## 2022-12-23 NOTE — TELEPHONE ENCOUNTER
Called pt to notify them that blood work orders have been faxed over to [facility]. Per pt will get them done Prior to appointment.

## 2022-12-23 NOTE — TELEPHONE ENCOUNTER
AB     Caller: Jacobo Bowling    Topic/issue: Jacobo is calling today in regards to his lab orders, he would like them sent to [facility] so he can complete them before his upcoming appointment. Please advise.     Callback Number: [phone] (home)     Thank you,   Bridgette HEADLEY

## 2022-12-28 ENCOUNTER — OFFICE VISIT (OUTPATIENT)
Dept: CARDIOLOGY | Facility: PHYSICIAN GROUP | Age: 77
End: 2022-12-28
Payer: MEDICARE

## 2022-12-28 ENCOUNTER — NON-PROVIDER VISIT (OUTPATIENT)
Dept: CARDIOLOGY | Facility: PHYSICIAN GROUP | Age: 77
End: 2022-12-28
Payer: MEDICARE

## 2022-12-28 VITALS
OXYGEN SATURATION: 97 % | WEIGHT: 213 LBS | HEIGHT: 70 IN | HEART RATE: 70 BPM | RESPIRATION RATE: 16 BRPM | SYSTOLIC BLOOD PRESSURE: 138 MMHG | BODY MASS INDEX: 30.49 KG/M2 | DIASTOLIC BLOOD PRESSURE: 82 MMHG

## 2022-12-28 VITALS
DIASTOLIC BLOOD PRESSURE: 82 MMHG | BODY MASS INDEX: 30.49 KG/M2 | WEIGHT: 213 LBS | SYSTOLIC BLOOD PRESSURE: 138 MMHG | HEIGHT: 70 IN | OXYGEN SATURATION: 97 % | HEART RATE: 70 BPM | RESPIRATION RATE: 16 BRPM

## 2022-12-28 DIAGNOSIS — Z95.0 CARDIAC PACEMAKER IN SITU: ICD-10-CM

## 2022-12-28 DIAGNOSIS — Z79.01 CHRONIC ANTICOAGULATION: ICD-10-CM

## 2022-12-28 DIAGNOSIS — I10 HYPERTENSION, UNSPECIFIED TYPE: ICD-10-CM

## 2022-12-28 DIAGNOSIS — E78.2 MIXED HYPERLIPIDEMIA: ICD-10-CM

## 2022-12-28 DIAGNOSIS — I44.2 AV BLOCK, COMPLETE (HCC): ICD-10-CM

## 2022-12-28 DIAGNOSIS — I06.0 RHEUMATIC AORTIC STENOSIS: ICD-10-CM

## 2022-12-28 DIAGNOSIS — I48.0 PAROXYSMAL ATRIAL FIBRILLATION (HCC): ICD-10-CM

## 2022-12-28 PROBLEM — M51.369 DEGENERATION OF LUMBAR INTERVERTEBRAL DISC: Status: ACTIVE | Noted: 2022-10-13

## 2022-12-28 PROBLEM — M54.16 LUMBAR RADICULOPATHY: Status: ACTIVE | Noted: 2022-10-13

## 2022-12-28 PROBLEM — M51.36 DEGENERATION OF LUMBAR INTERVERTEBRAL DISC: Status: ACTIVE | Noted: 2022-10-13

## 2022-12-28 PROCEDURE — 99214 OFFICE O/P EST MOD 30 MIN: CPT | Performed by: NURSE PRACTITIONER

## 2022-12-28 PROCEDURE — 93280 PM DEVICE PROGR EVAL DUAL: CPT | Performed by: NURSE PRACTITIONER

## 2022-12-28 ASSESSMENT — ENCOUNTER SYMPTOMS
SHORTNESS OF BREATH: 0
DIZZINESS: 0
BACK PAIN: 1
PND: 0
LOSS OF CONSCIOUSNESS: 0
PALPITATIONS: 0
ABDOMINAL PAIN: 0
FEVER: 0
BRUISES/BLEEDS EASILY: 0
NERVOUS/ANXIOUS: 1
NAUSEA: 0
ORTHOPNEA: 0
MYALGIAS: 0
CHILLS: 0
COUGH: 0
HEADACHES: 0

## 2022-12-28 ASSESSMENT — FIBROSIS 4 INDEX
FIB4 SCORE: 4.01
FIB4 SCORE: 4.01

## 2022-12-28 NOTE — PROGRESS NOTES
Chief Complaint   Patient presents with    Follow-Up    Pacemaker Check/Dysfunction    AV Block Complete    Atrial Fibrillation    Anticoagulation    HTN (Controlled)    Hyperlipidemia    Aortic Stenosis       Subjective     Jacobo Bowling is a 77 y.o. male who presents today for six month follow-up for PM check, AV block, PAFib, anticoagulation, HTN, hyperlipidemia, and mild AS.    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    Last summer, we tried him on some Lipitor for elevated lipids; he developed severe diarrhea, that required an ER visit. He was also diagnosed with Covid at the time. He prefers not to retry it.    He is here today for six month follow-up. Generally, he is doing well, but he has had a lot of sciatica; he did have an epidural with only modest relief. He does have follow-up next month. He denies any chest pain, pressure or discomfort; no palpitations; no shortness of breath, orthopnea or PND; no dizziness or syncope; no LE edema.      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 Grade II diastolic dysfunction. Normal RA and RV. Mildly dilated LA. Mild MR. Mild AS (Vmax 2.7m/s, peak 30mmHg, mean 15mmHg), mild TR. RVSP 39mmHg.    Rheumatic fever 1957    Snoring      Past Surgical History:    Procedure Laterality Date    PB EXCIS TENDON SHEATH LESION, HAND/FINGER Left 2021    Procedure: EXCISION BIOPSY LEFT WRIST WITH CULTURES;  Surgeon: Emiliano Dawkins M.D.;  Location: [facility];  Service: Orthopedics    LA NEUROPLASTY & OR TRANSPOS MEDIAN NRV CARPAL ZULEMA Left 12/3/2020    Procedure: LEFT CARPAL TUNNEL RELEASE, LEFT GANGLION CYST EXCISION;  Surgeon: Emiliano Dawkins M.D.;  Location: [facility];  Service: Orthopedics    PACEMAKER INSERTION Left 2020    [employer] Accolade MRI L311 implanted by Dr. Cantrell.    PB RECONSTR TOTAL SHOULDER IMPLANT Right 2020    Procedure: RT ARTHROPLASTY, SHOULDER, TOTAL;  Surgeon: Arsenio Hernandez M.D.;  Location: SURGERY [facility];  Service: Orthopedics    OTHER      left eye surgery     OTHER ORTHOPEDIC SURGERY      bilat RCR, shoulders, carpal tunnel surgery     Family History   Problem Relation Age of Onset    Cancer Mother         Breast,  at 62    Stroke Father         CVA age 67     Social History     Socioeconomic History    Marital status:      Spouse name: Not on file    Number of children: Not on file    Years of education: Not on file    Highest education level: Not on file   Occupational History    Not on file   Tobacco Use    Smoking status: Every Day     Packs/day: 0.30     Types: Cigarettes     Start date:     Smokeless tobacco: Never    Tobacco comments:     4 daily, pt states he does not inhale   Vaping Use    Vaping Use: Never used   Substance and Sexual Activity    Alcohol use: Yes     Alcohol/week: 1.8 oz     Types: 1 Cans of beer, 2 Shots of liquor per week     Comment: weekly, beer or vodka    Drug use: No    Sexual activity: Not on file   Other Topics Concern    Not on file   Social History Narrative    Not on file     Social Determinants of Health     Financial Resource Strain: Not on file   Food Insecurity: Not on file   Transportation Needs: Not on file   Physical Activity: Not on file  "  Stress: Not on file   Social Connections: Not on file   Intimate Partner Violence: Not on file   Housing Stability: Not on file     Allergies   Allergen Reactions    Atorvastatin Diarrhea     Sever diarrhea, does not want to try again    Typhoid Vaccines      Outpatient Encounter Medications as of 12/28/2022   Medication Sig Dispense Refill    hydrocortisone rectal (PERIANAL) 2.5% Cream Apply small amount to anus daily 30 g 5    lisinopril (PRINIVIL) 2.5 MG Tab TAKE 1 TABLET BY MOUTH EVERY DAY 90 Tablet 3    KRILL OIL PO Take  by mouth.      [DISCONTINUED] hydrocortisone 1 % Cream Apply 1 Application topically 2 times a day. (Patient not taking: Reported on 12/28/2022)      ELIQUIS 5 MG Tab TAKE 1 TABLET TWICE A  Tablet 3    [DISCONTINUED] atorvastatin (LIPITOR) 20 MG Tab Take 1 Tablet by mouth every evening. (Patient not taking: Reported on 8/10/2022) 90 Tablet 3    multivitamin (THERAGRAN) Tab Take 1 Tab by mouth every day.       No facility-administered encounter medications on file as of 12/28/2022.     Review of Systems   Constitutional:  Negative for chills and fever.   HENT:  Negative for congestion.    Respiratory:  Negative for cough and shortness of breath.    Cardiovascular:  Negative for chest pain, palpitations, orthopnea, leg swelling and PND.   Gastrointestinal:  Negative for abdominal pain and nausea.   Musculoskeletal:  Positive for back pain. Negative for myalgias.   Skin:  Negative for rash.   Neurological:  Negative for dizziness, loss of consciousness and headaches.   Endo/Heme/Allergies:  Does not bruise/bleed easily.   Psychiatric/Behavioral:  The patient is nervous/anxious.             Objective     /82 (BP Location: Left arm, Patient Position: Sitting, BP Cuff Size: Adult)   Pulse 70   Resp 16   Ht 1.778 m (5' 10\")   Wt 96.6 kg (213 lb)   SpO2 97%   BMI 30.56 kg/m²     Physical Exam  Constitutional:       Appearance: He is well-developed.   HENT:      Head: " Normocephalic.   Neck:      Vascular: No JVD.   Cardiovascular:      Rate and Rhythm: Normal rate and regular rhythm.      Heart sounds: Normal heart sounds.      Comments: PM in left chest wall.  Pulmonary:      Effort: Pulmonary effort is normal. No respiratory distress.      Breath sounds: Normal breath sounds. No wheezing or rales.   Abdominal:      General: Bowel sounds are normal. There is no distension.      Palpations: Abdomen is soft.      Tenderness: There is no abdominal tenderness.   Musculoskeletal:         General: Normal range of motion.      Cervical back: Normal range of motion and neck supple.   Skin:     General: Skin is warm and dry.      Findings: No rash.   Neurological:      Mental Status: He is alert and oriented to person, place, and time.     PM interrogation today reveals presenting rhythm is atrial fibrillation for the last two weeks; he is asymptomatic. Otherwise, normal function. No changes are made today.    CONCLUSIONS OF ECHOCARDIOGRAM OF 3/31/2022:  The left ventricular ejection fraction is visually estimated to be 65-  70%.  Pacer/ICD wire seen in the right ventricle.  Calcified aortic valve leaflets with mild aortic stenosis: Vmax is 2.7   m/s, MG 15 mmHg, BRE 2 cm2.  Mild tricuspid regurgitation.  Right ventricular systolic pressure is estimated to be 39 mmHg.  Compared to the prior echo on 12/02/2020, no significant change, aortic   stenosis appears mild.     CONCLUSIONS OF ECHOCARDIOGRAM OF 12/2/2020:  Left ventricular ejection fraction is visually estimated to be 55-60%.  Grade II diastolic dysfunction.  Pacer/ICD wire seen in right ventricle.  Moderately dilated left atrium.  Calcified aortic valve leaflets with mild to moderate aortic   stenosis:Vmax is 2.2 m/s, BRE 1.2 cm2, MG 12 mmHg, DI 0.5.  Unable to estimate pulmonary artery pressure, normal estimated right   atrial pressure.  Compared to prior Echo - 12/18/19, no significant change.     Lab Results   Component Value  "Date/Time    CHOLSTRLTOT 195 12/24/2022 07:49 AM     (H) 12/24/2022 07:49 AM    HDL 48.0 12/24/2022 07:49 AM    TRIGLYCERIDE 105 12/24/2022 07:49 AM       Lab Results   Component Value Date/Time    SODIUM 140 12/24/2022 07:49 AM    POTASSIUM 5.0 12/24/2022 07:49 AM    CHLORIDE 103 12/24/2022 07:49 AM    CO2 30 12/24/2022 07:49 AM    GLUCOSE 105 (H) 12/24/2022 07:49 AM    BUN 25 (H) 12/24/2022 07:49 AM    CREATININE 1.4 (H) 12/24/2022 07:49 AM    BUNCREATRAT 13 01/24/2020 06:50 AM     Lab Results   Component Value Date/Time    ALKPHOSPHAT 46 12/24/2022 07:49 AM    ASTSGOT 29 12/24/2022 07:49 AM    ALTSGPT 27 12/24/2022 07:49 AM    TBILIRUBIN 0.7 12/24/2022 07:49 AM       Lab Results   Component Value Date/Time    WBC 6.7 07/25/2022 12:30 PM    RBC 3.91 (L) 07/25/2022 12:30 PM    HEMOGLOBIN 14.7 07/25/2022 12:30 PM    HEMATOCRIT 42.1 07/25/2022 12:30 PM    .7 (H) 07/25/2022 12:30 PM    MCH 37.6 (H) 07/25/2022 12:30 PM    MCHC 34.9 07/25/2022 12:30 PM    MPV 12.1 (H) 07/25/2022 12:30 PM         Assessment & Plan     1. Cardiac pacemaker in situ        2. AV block, complete (HCC)        3. Paroxysmal atrial fibrillation (HCC)        4. Chronic anticoagulation        5. Hypertension, unspecified type        6. Mixed hyperlipidemia        7. Rheumatic aortic stenosis            Medical Decision Making: Today's Assessment/Status/Plan:      1. High AV block, with PAFib, with PPM. Presenting rhythm today is atrial fibrillation x 2 weeks; he is asymptomatic and rate controlled. We discussed rate versus rhythm control (AAR versus DCCV), he prefers to just stay \"as is\" for now. We will monitor over time. If he develops symptoms, we can discuss other options.    2. Chronic anticoagulation with Eliquis. No bleeding problems.    3. Hypertension, treated with low dose Lisinopril, stable.    4. Hyperlipidemia, not currently on any therapy. He developed severe diarrhea on Lipitor, and prefers not to retry it, or another " agent. He will work in dietary choices.    5. Mild AS, stable on echo in March 2022, from 2020. We will consider repeating next year.    Same medications for now. Follow-up in 6 months for next PM check; follow-up sooner if clinical condition changes.

## 2023-01-16 ENCOUNTER — NON-PROVIDER VISIT (OUTPATIENT)
Dept: CARDIOLOGY | Facility: MEDICAL CENTER | Age: 78
End: 2023-01-16
Payer: MEDICARE

## 2023-01-16 PROCEDURE — 93294 REM INTERROG EVL PM/LDLS PM: CPT | Performed by: INTERNAL MEDICINE

## 2023-01-17 NOTE — CARDIAC REMOTE MONITOR - SCAN
Device transmission reviewed. Device demonstrated appropriate function.       Electronically Signed by: Shyanne Grey M.D.    2/2/2023  1:17 PM

## 2023-04-17 ENCOUNTER — NON-PROVIDER VISIT (OUTPATIENT)
Dept: CARDIOLOGY | Facility: MEDICAL CENTER | Age: 78
End: 2023-04-17
Payer: MEDICARE

## 2023-04-17 PROCEDURE — 93294 REM INTERROG EVL PM/LDLS PM: CPT | Performed by: INTERNAL MEDICINE

## 2023-04-17 NOTE — CARDIAC REMOTE MONITOR - SCAN
Device transmission reviewed. Device demonstrated appropriate function.       Electronically Signed by: Shyanne Grey M.D.    4/18/2023  4:11 PM

## 2023-05-17 ENCOUNTER — TELEPHONE (OUTPATIENT)
Dept: CARDIOLOGY | Facility: MEDICAL CENTER | Age: 78
End: 2023-05-17
Payer: MEDICARE

## 2023-05-17 NOTE — LETTER
PROCEDURE/SURGERY CLEARANCE FORM    Date: 5/17/2023     Dear Surgeon or Proceduralist,      Thank you for your request for cardiac stratification of our mutual patient Jacobo Bowling 1945. We have reviewed their [facility] records; and to the best of our understanding this patient has not had stenting, ablation, cardiothoracic surgery or hospitalization for cardiovascular reasons in the past 6 months.  Jacobo Bowling has been seen within the past 18 months and is considered to have non-modifiable cardiac risk for this low-risk procedure/surgery. They may proceed from a cardiovascular standpoint and may hold their antiplatelet/anticoagulation as briefly as possible. Please have patient resume this medication when hemodynamically stable to do so.     Aspirin or Prasugrel   - hold 7 days prior to procedure/surgery, resume when hemodynamically stable      Clopidrogrel or Ticagrelor  - hold 7 days for all neurological procedures, hold 5 days prior to all other procedure/surgery,  resume when hemodynamically stable     Warfarin - hold 7 days for all neurological procedures, hold 5 days prior to all other procedure/surgery and coordinate with [facility] ([phone]) INR testing and dose management.      Pradaxa/Xarelto/Eliquis/Savesya - hold 1 day prior to procedure for low bleeding risk procedure, 2 days for high bleeding risk procedure, or consider holding 3 days or longer for patients with reduced kidney function (CrCl <30mL/min) or spinal/cranial surgeries/procedures.      If they have a mechanical heart valve, please coordinate with [facility] Anticoagulation Service ([phone]) the proper management of their anticoagulant in the periprocedural or perioperative period.      Some patients have higher risk for cardiovascular complications or holding medication. If our patient has had prior complications of holding antiplatelet or anticoagulants in the past and we have seen them after  these events, we have addressed these concerns with the patient. They are at an unknown degree of increased risk for recurrent complication.  You may hold anticoagulation/antiplatelets for the procedure or surgery if the benefits of the procedure or surgery outweigh this nonmodifiable risk.      If Jacobo Bowling 1945 has new symptoms of heart failure decompensation, unstable arrythmia, or angina please reach out and we will assess the patient.      If you have other patient-specific concerns, please feel free to reach out to the patient's cardiologist directly at [phone].     Thank you,       [facility]

## 2023-05-17 NOTE — TELEPHONE ENCOUNTER
Last OV: 12/28/2022  Proposed Surgery: Minimally Invasive Bilateral Lumbar 4 - Sacral 1 Laminotomies Via Right Approach  Surgery Date: 5/22/2023  Requesting Office Name: Prabhakar Oglesby  Fax Number: [phone]  Preference of Location (default is surgery center unless specified by Cardiologist or [facility])  Prior Clearance Addressed: No      Anticoags/Antiplatelets: Apixaban   Outstanding Cardiac Imaging : No  Stent, Cardiac Devices, or Catheterization: Yes  Date : 7/17/2020   Ablation, TAVR, Cardioversion: No  Recent Cardiac Hospitalization: No            When: N/A  History (cardiac history):   Past Medical History:   Diagnosis Date    Arthritis     At risk for sleep apnea     AV block, complete (HCC) 07/2020    Status post pacemaker placement    Chronic anticoagulation     Heart murmur     1957 detected with murmur, then gone now say slight murmur present    History of chickenpox     History of measles     History of mumps     History of rheumatic fever     Hypertension     Pacemaker 07/2020    left chest wall , [employer]    Paroxysmal atrial fibrillation (HCC)     Rheumatic aortic stenosis 03/2022    Echocardiogram with normal LV size, LVEF 65-70%. Grade II diastolic dysfunction. Normal RA and RV. Mildly dilated LA. Mild MR. Mild AS (Vmax 2.7m/s, peak 30mmHg, mean 15mmHg), mild TR. RVSP 39mmHg.    Rheumatic fever 1957    Snoring              Surgical Clearance Letter Sent: YES   **Scan clearance request letter into [facility].**

## 2023-06-14 ENCOUNTER — NON-PROVIDER VISIT (OUTPATIENT)
Dept: CARDIOLOGY | Facility: PHYSICIAN GROUP | Age: 78
End: 2023-06-14
Payer: MEDICARE

## 2023-06-14 ENCOUNTER — OFFICE VISIT (OUTPATIENT)
Dept: CARDIOLOGY | Facility: PHYSICIAN GROUP | Age: 78
End: 2023-06-14
Payer: MEDICARE

## 2023-06-14 VITALS
HEIGHT: 70 IN | WEIGHT: 211 LBS | RESPIRATION RATE: 12 BRPM | SYSTOLIC BLOOD PRESSURE: 106 MMHG | HEART RATE: 76 BPM | BODY MASS INDEX: 30.21 KG/M2 | DIASTOLIC BLOOD PRESSURE: 58 MMHG | OXYGEN SATURATION: 94 %

## 2023-06-14 VITALS
WEIGHT: 211 LBS | BODY MASS INDEX: 30.21 KG/M2 | RESPIRATION RATE: 12 BRPM | SYSTOLIC BLOOD PRESSURE: 106 MMHG | OXYGEN SATURATION: 94 % | HEART RATE: 76 BPM | DIASTOLIC BLOOD PRESSURE: 58 MMHG | HEIGHT: 70 IN

## 2023-06-14 DIAGNOSIS — I48.0 PAROXYSMAL ATRIAL FIBRILLATION (HCC): ICD-10-CM

## 2023-06-14 DIAGNOSIS — I44.2 AV BLOCK, COMPLETE (HCC): ICD-10-CM

## 2023-06-14 DIAGNOSIS — Z79.01 CHRONIC ANTICOAGULATION: ICD-10-CM

## 2023-06-14 DIAGNOSIS — M54.16 LUMBAR RADICULOPATHY: ICD-10-CM

## 2023-06-14 DIAGNOSIS — Z95.0 CARDIAC PACEMAKER IN SITU: ICD-10-CM

## 2023-06-14 DIAGNOSIS — I10 ESSENTIAL HYPERTENSION: ICD-10-CM

## 2023-06-14 DIAGNOSIS — E78.2 MIXED HYPERLIPIDEMIA: ICD-10-CM

## 2023-06-14 DIAGNOSIS — I06.0 RHEUMATIC AORTIC STENOSIS: ICD-10-CM

## 2023-06-14 PROCEDURE — 93280 PM DEVICE PROGR EVAL DUAL: CPT | Performed by: NURSE PRACTITIONER

## 2023-06-14 PROCEDURE — 3078F DIAST BP <80 MM HG: CPT | Performed by: NURSE PRACTITIONER

## 2023-06-14 PROCEDURE — 3074F SYST BP LT 130 MM HG: CPT | Performed by: NURSE PRACTITIONER

## 2023-06-14 PROCEDURE — 99214 OFFICE O/P EST MOD 30 MIN: CPT | Performed by: NURSE PRACTITIONER

## 2023-06-14 RX ORDER — LISINOPRIL 2.5 MG/1
2.5 TABLET ORAL DAILY
Qty: 100 TABLET | Refills: 3 | Status: SHIPPED | OUTPATIENT
Start: 2023-06-14 | End: 2023-08-29 | Stop reason: SDUPTHER

## 2023-06-14 ASSESSMENT — ENCOUNTER SYMPTOMS
ORTHOPNEA: 0
PND: 0
HEADACHES: 0
NAUSEA: 0
CHILLS: 0
NERVOUS/ANXIOUS: 1
BRUISES/BLEEDS EASILY: 0
LOSS OF CONSCIOUSNESS: 0
SHORTNESS OF BREATH: 0
MYALGIAS: 0
ABDOMINAL PAIN: 0
DIZZINESS: 0
PALPITATIONS: 0
FEVER: 0
COUGH: 0

## 2023-06-14 ASSESSMENT — FIBROSIS 4 INDEX
FIB4 SCORE: 3.77
FIB4 SCORE: 3.77

## 2023-06-14 NOTE — PROGRESS NOTES
Chief Complaint   Patient presents with    Follow-Up    Pacemaker Check/Dysfunction    AV Block Complete    Atrial Fibrillation    Anticoagulation    HTN (Controlled)    Hyperlipidemia       Subjective     Jacobo Bowling is a 77 y.o. male who presents today for six month follow-up for PM check, PAFib, anticoagulation, mild AS, HTN, hyperlipidemia, and FRANCHESCA.    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    At last follow-up in December 2022, his presenting rhythm was atrial fibrillation; we discussed options, but because he was asymptomatic, we opted not to do anything.    In summer 2022, we tried him on Lipitor for elevated lipids, but he developed sever diarrhea; he does not want to retry them.     In May 2023, he had minimally invasive back surgery by Dr. Berry, which went well. He plans to start PT soon.    He is here today for six month follow-up. Generally, he is doing well, and his back is healing well. He denies any chest pain, pressure or discomfort; no symptomatic palpitations; no shortness of breath, orthopnea or PND; no dizziness or syncope; no LE edema.      Past Medical History:   Diagnosis Date    Arthritis     At risk for sleep apnea     AV block, complete (HCC) 07/2020    Status post pacemaker placement    Chronic anticoagulation     Heart murmur     1957 detected with murmur, then gone now say slight murmur present    History of chickenpox     History of measles     History of mumps     History of rheumatic fever     Hypertension     Pacemaker 07/2020    left chest wall , [employer]    Paroxysmal atrial fibrillation (HCC)     Rheumatic aortic stenosis 03/2022    Echocardiogram with normal LV size, LVEF 65-70%. Grade II diastolic dysfunction. Normal RA and RV. Mildly dilated LA. Mild  MR. Mild AS (Vmax 2.7m/s, peak 30mmHg, mean 15mmHg), mild TR. RVSP 39mmHg.    Rheumatic fever 1957    Snoring      Past Surgical History:   Procedure Laterality Date    PB EXCIS TENDON SHEATH LESION, HAND/FINGER Left 2021    Procedure: EXCISION BIOPSY LEFT WRIST WITH CULTURES;  Surgeon: Emiliano Dawkins M.D.;  Location: SUR[facility] [place];  Service: Orthopedics    HI NEUROPLASTY & OR TRANSPOS MEDIAN NRV CARPAL ZULEMA Left 12/3/2020    Procedure: LEFT CARPAL TUNNEL RELEASE, LEFT GANGLION CYST EXCISION;  Surgeon: Emiliano Dawkins M.D.;  Location: [facility] [place];  Service: Orthopedics    PACEMAKER INSERTION Left 2020    [employer] Accolade MRI L311 implanted by Dr. Cantrell.    PB RECONSTR TOTAL SHOULDER IMPLANT Right 2020    Procedure: RT ARTHROPLASTY, SHOULDER, TOTAL;  Surgeon: Arsenio Hernandez M.D.;  Location: SURGERY [facility];  Service: Orthopedics    OTHER      left eye surgery     OTHER ORTHOPEDIC SURGERY      bilat RCR, shoulders, carpal tunnel surgery     Family History   Problem Relation Age of Onset    Cancer Mother         Breast,  at 62    Stroke Father         CVA age 67     Social History     Socioeconomic History    Marital status:      Spouse name: Not on file    Number of children: Not on file    Years of education: Not on file    Highest education level: Not on file   Occupational History    Not on file   Tobacco Use    Smoking status: Former     Packs/day: 0.30     Types: Cigarettes     Start date:     Smokeless tobacco: Never    Tobacco comments:     4 daily, pt states he does not inhale   Vaping Use    Vaping Use: Never used   Substance and Sexual Activity    Alcohol use: Yes     Alcohol/week: 1.8 oz     Types: 1 Cans of beer, 2 Shots of liquor per week     Comment: Rarely    Drug use: No    Sexual activity: Not on file   Other Topics Concern    Not on file   Social History Narrative    Not on file     Social Determinants of Health     Financial Resource  Strain: Not on file   Food Insecurity: Not on file   Transportation Needs: Not on file   Physical Activity: Not on file   Stress: Not on file   Social Connections: Not on file   Intimate Partner Violence: Not on file   Housing Stability: Not on file     Allergies   Allergen Reactions    Atorvastatin Diarrhea     Sever diarrhea, does not want to try again     Outpatient Encounter Medications as of 6/14/2023   Medication Sig Dispense Refill    lisinopril (PRINIVIL) 2.5 MG Tab Take 1 Tablet by mouth every day. 100 Tablet 3    apixaban (ELIQUIS) 5mg Tab Take 1 Tablet by mouth 2 times a day. 200 Tablet 3    gabapentin (NEURONTIN) 300 MG Cap TAKE 1 CAPSULE BY MOUTH TWICE DAILY FOR 3 DAYS. MAY INCREASE TO 3 TIMES A DAY THEREAFTER      multivitamin (THERAGRAN) Tab Take 1 Tab by mouth every day.      [DISCONTINUED] tizanidine (ZANAFLEX) 4 MG Tab TAKE 1 TABLET BY MOUTH THREE TIMES DAILY (Patient not taking: Reported on 6/14/2023) 30 Tablet 0    [DISCONTINUED] hydrocortisone rectal (PERIANAL) 2.5% Cream Apply small amount to anus daily (Patient not taking: Reported on 6/14/2023) 30 g 5    [DISCONTINUED] lisinopril (PRINIVIL) 2.5 MG Tab TAKE 1 TABLET BY MOUTH EVERY DAY 90 Tablet 3    [DISCONTINUED] KRILL OIL PO Take  by mouth. (Patient not taking: Reported on 6/14/2023)      [DISCONTINUED] ELIQUIS 5 MG Tab TAKE 1 TABLET TWICE A  Tablet 3     No facility-administered encounter medications on file as of 6/14/2023.     Review of Systems   Constitutional:  Negative for chills and fever.   HENT:  Negative for congestion.    Respiratory:  Negative for cough and shortness of breath.    Cardiovascular:  Negative for chest pain, palpitations, orthopnea, leg swelling and PND.   Gastrointestinal:  Negative for abdominal pain and nausea.   Musculoskeletal:  Negative for myalgias.   Skin:  Negative for rash.   Neurological:  Negative for dizziness, loss of consciousness and headaches.   Endo/Heme/Allergies:  Does not bruise/bleed  "easily.   Psychiatric/Behavioral:  The patient is nervous/anxious.               Objective     /58 (BP Location: Left arm, Patient Position: Sitting, BP Cuff Size: Adult)   Pulse 76   Resp 12   Ht 1.778 m (5' 10\")   Wt 95.7 kg (211 lb)   SpO2 94%   BMI 30.28 kg/m²     Physical Exam  Constitutional:       Appearance: He is well-developed.   HENT:      Head: Normocephalic.   Neck:      Vascular: No JVD.   Cardiovascular:      Rate and Rhythm: Normal rate and regular rhythm.      Heart sounds: Normal heart sounds.      Comments: PM in left chest wall.  Pulmonary:      Effort: Pulmonary effort is normal. No respiratory distress.      Breath sounds: Normal breath sounds. No wheezing or rales.   Abdominal:      General: Bowel sounds are normal. There is no distension.      Palpations: Abdomen is soft.      Tenderness: There is no abdominal tenderness.   Musculoskeletal:         General: Normal range of motion.      Cervical back: Normal range of motion and neck supple.   Skin:     General: Skin is warm and dry.      Findings: No rash.   Neurological:      Mental Status: He is alert and oriented to person, place, and time.     PM interrogation today reveals normal function. Presenting rhythm is sinus. Mode switching 5% of total time. No changes are made today.    CONCLUSIONS OF ECHOCARDIOGRAM OF 3/31/2022:  The left ventricular ejection fraction is visually estimated to be 65-  70%.  Pacer/ICD wire seen in the right ventricle.  Calcified aortic valve leaflets with mild aortic stenosis: Vmax is 2.7   m/s, MG 15 mmHg, BRE 2 cm2.  Mild tricuspid regurgitation.  Right ventricular systolic pressure is estimated to be 39 mmHg.  Compared to the prior echo on 12/02/2020, no significant change, aortic stenosis appears mild.     CONCLUSIONS OF ECHOCARDIOGRAM OF 12/2/2020:  Left ventricular ejection fraction is visually estimated to be 55-60%.  Grade II diastolic dysfunction.  Pacer/ICD wire seen in right " ventricle.  Moderately dilated left atrium.  Calcified aortic valve leaflets with mild to moderate aortic   stenosis:Vmax is 2.2 m/s, BRE 1.2 cm2, MG 12 mmHg, DI 0.5.  Unable to estimate pulmonary artery pressure, normal estimated right atrial pressure.  Compared to prior Echo - 12/18/19, no significant change.       Component      Latest Ref Rng 5/19/2023   Sodium      136 - 144 mmol/L 139 (E)   Potassium      3.6 - 5.1 mmol/L 4.5 (E)   Chloride      102 - 110 mmol/L 106 (E)   Co2      22 - 32 mmol/L 28 (E)   Bun      Reference ranges for this assay have not been established. mg/dL 17 (E)   Creatinine      Reference ranges for this assay have not been established. mg/dL 1.19 (E)   Calcium      Reference ranges for this assay have not been established. mg/dL 9.5 (E)   Anion Gap      2 - 11 mmol/L 5 (E)   Glom Filt Rate, Est      >60 mL/min/1.7 59 (L) (E)   Glucose      60 - 99 mg/dL 80 (E)      Component      Latest Ref Rng 12/24/2022   Cholesterol,Tot      120 - 200 mg/dL 195    Triglycerides      0 - 150 mg/dL 105    LDL      <100 mg/dL 126 (H)    HDL      40.0 - 60.0 mg/dL 48.0    Chol-Hdl Ratio 4.06    Non HDL Cholesterol      30 - 160  147         Assessment & Plan     1. Cardiac pacemaker in situ        2. AV block, complete (HCC)        3. Paroxysmal atrial fibrillation (HCC)        4. Chronic anticoagulation  apixaban (ELIQUIS) 5mg Tab      5. Essential hypertension  lisinopril (PRINIVIL) 2.5 MG Tab      6. Mixed hyperlipidemia        7. Rheumatic aortic stenosis        8. Lumbar radiculopathy            Medical Decision Making: Today's Assessment/Status/Plan:      1. High AV block, with PAFib, with PPM, which is working normally. He is in sinus rhythm today. Mode switching 5% of total time.    2. Chronic anticoagulation with Eliquis, no bleeding problems.    3. Hypertension, treated with low dose Lisinopril, stable. BP is well controlled.    4. Hyperlipidemia, not currently on any therapy. He did NOT  tolerate Lipitor; prefers NOT to try any other agents. Lipids panel was OK.    5. Mild AS on echo in 2022, to repeat next fatmata.    6. Lumbar radiculopathy, status post surgery in May 2023, by Dr. Berry, He will be starting PT soon.    He remains stable at this time. Same mediations, which are renewed. Follow-up in 6 months for next PM check; will repeat echo next year. Follow-up sooner if clinical condition changes.

## 2023-07-05 ENCOUNTER — APPOINTMENT (OUTPATIENT)
Dept: CARDIOLOGY | Facility: PHYSICIAN GROUP | Age: 78
End: 2023-07-05
Payer: MEDICARE

## 2023-07-17 ENCOUNTER — NON-PROVIDER VISIT (OUTPATIENT)
Dept: CARDIOLOGY | Facility: MEDICAL CENTER | Age: 78
End: 2023-07-17
Payer: MEDICARE

## 2023-07-17 PROCEDURE — 93294 REM INTERROG EVL PM/LDLS PM: CPT | Performed by: INTERNAL MEDICINE

## 2023-07-17 NOTE — CARDIAC REMOTE MONITOR - SCAN
Device transmission reviewed. Device demonstrated appropriate function.       Electronically Signed by: Shyanne Grey M.D.    7/28/2023  4:20 PM

## 2023-08-29 ENCOUNTER — TELEPHONE (OUTPATIENT)
Dept: CARDIOLOGY | Facility: MEDICAL CENTER | Age: 78
End: 2023-08-29
Payer: MEDICARE

## 2023-08-29 DIAGNOSIS — I10 ESSENTIAL HYPERTENSION: ICD-10-CM

## 2023-08-29 DIAGNOSIS — Z79.01 CHRONIC ANTICOAGULATION: ICD-10-CM

## 2023-08-29 NOTE — TELEPHONE ENCOUNTER
AB       Caller: Jacobo Bowling     Topic/issue: Patient states the pharmacy advised that [facility] is refusing to refill apixaban (ELIQUIS) 5mg Tab and patient would like a call back regarding this     Callback Number: [phone]    Thank You,   Rehana NAVARRETE

## 2023-08-29 NOTE — TELEPHONE ENCOUNTER
Is the patient due for a refill? Yes- pharmacy change to [facility].     Was the patient seen the past year? Yes    Date of last office visit: 6/14/2023    Does the patient have an upcoming appointment?  Yes   If yes, When? 12/6/2023    Provider to refill:AB    Does the patients insurance require a 100 day supply?  No

## 2023-08-30 ENCOUNTER — APPOINTMENT (OUTPATIENT)
Dept: CARDIOLOGY | Facility: PHYSICIAN GROUP | Age: 78
End: 2023-08-30
Payer: MEDICARE

## 2023-08-30 NOTE — TELEPHONE ENCOUNTER
Pt returned call, asking that Eliquis be sent to [facility]. Too expensive at [facility]. Sent as requested.

## 2023-08-30 NOTE — TELEPHONE ENCOUNTER
Phone Number Called: [phone]    Call outcome: Did not leave a detailed message. Requested patient to call back.    Message:     LVM for pt to call back regarding Rx. Looks like pt has an active Rx at [facility] as of 06/14/2023 for a one year supply. Wanted to clarify if pt is needing this Rx to go to a different pharmacy? Advised pt to call back.

## 2023-09-01 RX ORDER — LISINOPRIL 2.5 MG/1
2.5 TABLET ORAL DAILY
Qty: 90 TABLET | Refills: 2 | Status: SHIPPED | OUTPATIENT
Start: 2023-09-01

## 2023-10-16 ENCOUNTER — NON-PROVIDER VISIT (OUTPATIENT)
Dept: CARDIOLOGY | Facility: MEDICAL CENTER | Age: 78
End: 2023-10-16
Payer: MEDICARE

## 2023-10-16 PROCEDURE — 93294 REM INTERROG EVL PM/LDLS PM: CPT | Performed by: INTERNAL MEDICINE

## 2023-10-16 NOTE — CARDIAC REMOTE MONITOR - SCAN
Device transmission reviewed. Device demonstrated appropriate function.       Electronically Signed by: Shyanne Grey M.D.    11/7/2023  2:03 PM

## 2023-12-06 ENCOUNTER — APPOINTMENT (OUTPATIENT)
Dept: CARDIOLOGY | Facility: PHYSICIAN GROUP | Age: 78
End: 2023-12-06
Payer: MEDICARE

## 2024-01-03 ENCOUNTER — OFFICE VISIT (OUTPATIENT)
Dept: CARDIOLOGY | Facility: PHYSICIAN GROUP | Age: 79
End: 2024-01-03
Payer: MEDICARE

## 2024-01-03 ENCOUNTER — NON-PROVIDER VISIT (OUTPATIENT)
Dept: CARDIOLOGY | Facility: PHYSICIAN GROUP | Age: 79
End: 2024-01-03
Payer: MEDICARE

## 2024-01-03 VITALS
HEART RATE: 60 BPM | WEIGHT: 206 LBS | OXYGEN SATURATION: 97 % | BODY MASS INDEX: 29.49 KG/M2 | RESPIRATION RATE: 15 BRPM | DIASTOLIC BLOOD PRESSURE: 72 MMHG | SYSTOLIC BLOOD PRESSURE: 120 MMHG | HEIGHT: 70 IN

## 2024-01-03 VITALS — OXYGEN SATURATION: 97 % | HEART RATE: 60 BPM | SYSTOLIC BLOOD PRESSURE: 120 MMHG | DIASTOLIC BLOOD PRESSURE: 72 MMHG

## 2024-01-03 DIAGNOSIS — Z79.01 CHRONIC ANTICOAGULATION: ICD-10-CM

## 2024-01-03 DIAGNOSIS — Z95.0 CARDIAC PACEMAKER IN SITU: ICD-10-CM

## 2024-01-03 DIAGNOSIS — I06.0 RHEUMATIC AORTIC STENOSIS: ICD-10-CM

## 2024-01-03 DIAGNOSIS — I44.2 AV BLOCK, COMPLETE (HCC): ICD-10-CM

## 2024-01-03 DIAGNOSIS — I10 HYPERTENSION, UNSPECIFIED TYPE: ICD-10-CM

## 2024-01-03 DIAGNOSIS — I48.0 PAROXYSMAL ATRIAL FIBRILLATION (HCC): ICD-10-CM

## 2024-01-03 DIAGNOSIS — E78.2 MIXED HYPERLIPIDEMIA: ICD-10-CM

## 2024-01-03 PROCEDURE — 99214 OFFICE O/P EST MOD 30 MIN: CPT | Mod: 25 | Performed by: NURSE PRACTITIONER

## 2024-01-03 PROCEDURE — 3074F SYST BP LT 130 MM HG: CPT | Performed by: NURSE PRACTITIONER

## 2024-01-03 PROCEDURE — 93288 INTERROG EVL PM/LDLS PM IP: CPT | Mod: 26 | Performed by: NURSE PRACTITIONER

## 2024-01-03 PROCEDURE — 3078F DIAST BP <80 MM HG: CPT | Performed by: NURSE PRACTITIONER

## 2024-01-03 ASSESSMENT — ENCOUNTER SYMPTOMS
NERVOUS/ANXIOUS: 0
PND: 0
BACK PAIN: 1
PALPITATIONS: 0
DIZZINESS: 0
COUGH: 0
HEADACHES: 0
FEVER: 0
ORTHOPNEA: 0
ABDOMINAL PAIN: 0
NAUSEA: 0
LOSS OF CONSCIOUSNESS: 0
CHILLS: 0
BRUISES/BLEEDS EASILY: 0
SHORTNESS OF BREATH: 0
MYALGIAS: 0

## 2024-01-03 ASSESSMENT — FIBROSIS 4 INDEX: FIB4 SCORE: 3.82

## 2024-01-03 NOTE — PROGRESS NOTES
Chief Complaint   Patient presents with    Follow-Up    Pacemaker Check/Dysfunction    AV Block Complete    Atrial Fibrillation    Anticoagulation    HTN (Controlled)    Hyperlipidemia       Subjective     Jacobo Bowling is a 78 y.o. male who presents today for six month follow-up for PM check, AV block, PAFib, anticoagulation, HTN, hyperlipidemia, and mild AS.    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     In summer 2022, we tried him on Lipitor for elevated lipids, but he developed severe diarrhea; he does not want to retry any other lipid options.     In May 2023, he had minimally invasive back surgery by Dr. Berry, which went went. He is still having some mild sciatica, but it is tolerable.    He is here today for six month follow-up.   Generally, he is doing well, and he feels good. He denies any chest pain, pressure, tightness or discomfort; no symptomatic palpitations or racing of his heart; no shortness of breath, orthopnea or PND; no dizziness or syncope; no LE edema. He has had some balance issues lately, and did have a stumble while visiting his daughter (who has some health issues); he did not pass out. No bleeding issues on Eliquis.    Past Medical History:   Diagnosis Date    Arthritis     At risk for sleep apnea     AV block, complete (HCC) 07/2020    Status post pacemaker placement    Chronic anticoagulation     Heart murmur     1957 detected with murmur, then gone now say slight murmur present    History of chickenpox     History of measles     History of mumps     History of rheumatic fever     Hypertension     Pacemaker 07/2020    left chest wall , [employer]    Paroxysmal atrial fibrillation (HCC)     Rheumatic aortic stenosis 03/2022     Echocardiogram with normal LV size, LVEF 65-70%. Grade II diastolic dysfunction. Normal RA and RV. Mildly dilated LA. Mild MR. Mild AS (Vmax 2.7m/s, peak 30mmHg, mean 15mmHg), mild TR. RVSP 39mmHg.    Rheumatic fever     Snoring      Past Surgical History:   Procedure Laterality Date    PB EXCIS TENDON SHEATH LESION, HAND/FINGER Left 2021    Procedure: EXCISION BIOPSY LEFT WRIST WITH CULTURES;  Surgeon: Emiliano Dawkins M.D.;  Location: SURGERY [place];  Service: Orthopedics    NE NEUROPLASTY & OR TRANSPOS MEDIAN NRV CARPAL ZULEMA Left 12/3/2020    Procedure: LEFT CARPAL TUNNEL RELEASE, LEFT GANGLION CYST EXCISION;  Surgeon: Emiliano Dawkins M.D.;  Location: SURGERY [place];  Service: Orthopedics    PACEMAKER INSERTION Left 2020    [employer] Accolade MRI L311 implanted by Dr. Cantrell.    PB RECONSTR TOTAL SHOULDER IMPLANT Right 2020    Procedure: RT ARTHROPLASTY, SHOULDER, TOTAL;  Surgeon: Arsenio Hernandez M.D.;  Location: SURGERY [facility];  Service: Orthopedics    OTHER      left eye surgery     OTHER ORTHOPEDIC SURGERY      bilat RCR, shoulders, carpal tunnel surgery     Family History   Problem Relation Age of Onset    Cancer Mother         Breast,  at 62    Stroke Father         CVA age 67     Social History     Socioeconomic History    Marital status:      Spouse name: Not on file    Number of children: Not on file    Years of education: Not on file    Highest education level: Not on file   Occupational History    Not on file   Tobacco Use    Smoking status: Former     Current packs/day: 0.30     Average packs/day: 0.3 packs/day for 31.0 years (9.3 ttl pk-yrs)     Types: Cigarettes     Start date:     Smokeless tobacco: Never    Tobacco comments:     4 daily, pt states he does not inhale   Vaping Use    Vaping Use: Never used   Substance and Sexual Activity    Alcohol use: Yes     Alcohol/week: 1.8 oz     Types: 1 Cans of beer, 2 Shots of liquor per week      Comment: Rarely    Drug use: No    Sexual activity: Not on file   Other Topics Concern    Not on file   Social History Narrative    Not on file     Social Determinants of Health     Financial Resource Strain: Not on file   Food Insecurity: Not on file   Transportation Needs: Not on file   Physical Activity: Not on file   Stress: Not on file   Social Connections: Not on file   Intimate Partner Violence: Not on file   Housing Stability: Not on file     Allergies   Allergen Reactions    Atorvastatin Diarrhea     Sever diarrhea, does not want to try again     Outpatient Encounter Medications as of 1/3/2024   Medication Sig Dispense Refill    hydrocortisone rectal (PERIANAL) 2.5% Cream Apply small amount to anus daily 30 g 5    [DISCONTINUED] ketotifen (ZADITOR) 0.025 % ophthalmic solution Administer 1 Drop into both eyes 2 times a day. (Patient not taking: Reported on 1/3/2024) 10 mL 2    lisinopril (PRINIVIL) 2.5 MG Tab Take 1 Tablet by mouth every day. 90 Tablet 2    apixaban (ELIQUIS) 5mg Tab Take 1 Tablet by mouth 2 times a day. 200 Tablet 3    gabapentin (NEURONTIN) 300 MG Cap TAKE 1 CAPSULE BY MOUTH TWICE DAILY FOR 3 DAYS. MAY INCREASE TO 3 TIMES A DAY THEREAFTER      multivitamin (THERAGRAN) Tab Take 1 Tab by mouth every day.       No facility-administered encounter medications on file as of 1/3/2024.     Review of Systems   Constitutional:  Negative for chills and fever.   HENT:  Negative for congestion.    Respiratory:  Negative for cough and shortness of breath.    Cardiovascular:  Negative for chest pain, palpitations, orthopnea, leg swelling and PND.   Gastrointestinal:  Negative for abdominal pain and nausea.   Musculoskeletal:  Positive for back pain. Negative for myalgias.        Improved with back surgery last year.   Skin:  Negative for rash.   Neurological:  Negative for dizziness, loss of consciousness and headaches.   Endo/Heme/Allergies:  Does not bruise/bleed easily.   Psychiatric/Behavioral:  The  patient is not nervous/anxious.               Objective     /72 (BP Location: Left arm, Patient Position: Sitting, BP Cuff Size: Adult)   Pulse 60   SpO2 97%     Physical Exam  Constitutional:       Appearance: He is well-developed.   HENT:      Head: Normocephalic.   Neck:      Vascular: No JVD.   Cardiovascular:      Rate and Rhythm: Normal rate and regular rhythm.      Heart sounds: Murmur heard.      Systolic murmur is present with a grade of 2/6.      Comments: PM in left chest wall.  Murmur at RUSB.  Pulmonary:      Effort: Pulmonary effort is normal. No respiratory distress.      Breath sounds: Normal breath sounds. No wheezing or rales.   Abdominal:      General: Bowel sounds are normal. There is no distension.      Palpations: Abdomen is soft.      Tenderness: There is no abdominal tenderness.   Musculoskeletal:         General: Normal range of motion.      Cervical back: Normal range of motion and neck supple.   Skin:     General: Skin is warm and dry.      Findings: No rash.   Neurological:      Mental Status: He is alert and oriented to person, place, and time.       PM interrogation today reveals normal function. Presenting rhythm is sinus. Mode switching <1% of total time (longest episode 4+ minutes). No changes are made today.     CONCLUSIONS OF ECHOCARDIOGRAM OF 3/31/2022:  The left ventricular ejection fraction is visually estimated to be 65-70%.  Pacer/ICD wire seen in the right ventricle.  Calcified aortic valve leaflets with mild aortic stenosis: Vmax is 2.7 m/s, MG 15 mmHg, BRE 2 cm2.  Mild tricuspid regurgitation.  Right ventricular systolic pressure is estimated to be 39 mmHg.  Compared to the prior echo on 12/02/2020, no significant change, aortic stenosis appears mild.     CONCLUSIONS OF ECHOCARDIOGRAM OF 12/2/2020:  Left ventricular ejection fraction is visually estimated to be 55-60%.  Grade II diastolic dysfunction.  Pacer/ICD wire seen in right ventricle.  Moderately dilated left  atrium.  Calcified aortic valve leaflets with mild to moderate aortic   stenosis:Vmax is 2.2 m/s, BRE 1.2 cm2, MG 12 mmHg, DI 0.5.  Unable to estimate pulmonary artery pressure, normal estimated right atrial pressure.  Compared to prior Echo - 12/18/19, no significant change.        Component      Latest Ref Rng 5/19/2023   Sodium      136 - 144 mmol/L 139 (E)   Potassium      3.6 - 5.1 mmol/L 4.5 (E)   Chloride      102 - 110 mmol/L 106 (E)   Co2      22 - 32 mmol/L 28 (E)   Bun      Reference ranges for this assay have not been established. mg/dL 17 (E)   Creatinine      Reference ranges for this assay have not been established. mg/dL 1.19 (E)   Calcium      Reference ranges for this assay have not been established. mg/dL 9.5 (E)   Anion Gap      2 - 11 mmol/L 5 (E)   Glom Filt Rate, Est      >60 mL/min/1.7 59 (L) (E)   Glucose      60 - 99 mg/dL 80 (E)      Lab Results   Component Value Date/Time    CHOLSTRLTOT 195 12/24/2022 07:49 AM     (H) 12/24/2022 07:49 AM    HDL 48.0 12/24/2022 07:49 AM    TRIGLYCERIDE 105 12/24/2022 07:49 AM        Assessment & Plan     1. Cardiac pacemaker in situ        2. AV block, complete (HCC)        3. Paroxysmal atrial fibrillation (HCC)  TSH      4. Chronic anticoagulation        5. Rheumatic aortic stenosis  EC-ECHOCARDIOGRAM COMPLETE W/O CONT      6. Hypertension, unspecified type  CBC WITHOUT DIFFERENTIAL      7. Mixed hyperlipidemia  Comp Metabolic Panel    Lipid Profile          Medical Decision Making: Today's Assessment/Status/Plan:      1. High AV block, with PAFib, with PPM, which is working normally. Mode switching <1% of total time, asymptomatic. No changes are made today.    2. Chronic anticoagulation with Eliquis. No bleeding problems.    3. Mild aortic stenosis on echo in 2022, to repeat echocardiogram.    4. Hypertension, treated with Lisinopril, stable.     5. Hyperlipidemia, not currently on any therapy, unable to tolerate statins; he is not interested in  other treatment.    6. Low back pain, status post minimally invasive surgery in 2023, stable.    He is doing well, and remains stable. Same medications, which are all renewed x 1 year. Labs and echo as above.    Follow-up in 6 months for next PM check; follow-up sooner if clinical condition changes.

## 2024-01-15 ENCOUNTER — NON-PROVIDER VISIT (OUTPATIENT)
Dept: CARDIOLOGY | Facility: MEDICAL CENTER | Age: 79
End: 2024-01-15
Payer: MEDICARE

## 2024-01-15 PROCEDURE — 93294 REM INTERROG EVL PM/LDLS PM: CPT | Performed by: INTERNAL MEDICINE

## 2024-01-15 NOTE — CARDIAC REMOTE MONITOR - SCAN
Device transmission reviewed. Device demonstrated appropriate function.       Electronically Signed by: Scott Cantrell M.D.    1/15/2024  12:23 PM

## 2024-04-15 ENCOUNTER — NON-PROVIDER VISIT (OUTPATIENT)
Dept: CARDIOLOGY | Facility: MEDICAL CENTER | Age: 79
End: 2024-04-15
Payer: MEDICARE

## 2024-06-20 DIAGNOSIS — I10 ESSENTIAL HYPERTENSION: ICD-10-CM

## 2024-06-20 RX ORDER — LISINOPRIL 2.5 MG/1
2.5 TABLET ORAL DAILY
Qty: 90 TABLET | Refills: 1 | Status: SHIPPED | OUTPATIENT
Start: 2024-06-20

## 2024-07-15 ENCOUNTER — NON-PROVIDER VISIT (OUTPATIENT)
Dept: CARDIOLOGY | Facility: MEDICAL CENTER | Age: 79
End: 2024-07-15
Payer: MEDICARE

## 2024-07-15 PROCEDURE — 93294 REM INTERROG EVL PM/LDLS PM: CPT | Performed by: INTERNAL MEDICINE

## 2024-08-28 ENCOUNTER — APPOINTMENT (OUTPATIENT)
Dept: CARDIOLOGY | Facility: PHYSICIAN GROUP | Age: 79
End: 2024-08-28
Payer: MEDICARE

## 2024-08-28 VITALS
HEIGHT: 70 IN | WEIGHT: 208 LBS | SYSTOLIC BLOOD PRESSURE: 122 MMHG | BODY MASS INDEX: 29.78 KG/M2 | DIASTOLIC BLOOD PRESSURE: 70 MMHG | RESPIRATION RATE: 14 BRPM | HEART RATE: 60 BPM | OXYGEN SATURATION: 93 %

## 2024-08-28 VITALS
DIASTOLIC BLOOD PRESSURE: 70 MMHG | HEART RATE: 60 BPM | HEIGHT: 70 IN | BODY MASS INDEX: 29.78 KG/M2 | RESPIRATION RATE: 14 BRPM | WEIGHT: 208 LBS | SYSTOLIC BLOOD PRESSURE: 122 MMHG | OXYGEN SATURATION: 93 %

## 2024-08-28 DIAGNOSIS — Z95.0 CARDIAC PACEMAKER IN SITU: ICD-10-CM

## 2024-08-28 DIAGNOSIS — I44.2 AV BLOCK, COMPLETE (HCC): ICD-10-CM

## 2024-08-28 DIAGNOSIS — E78.2 MIXED HYPERLIPIDEMIA: ICD-10-CM

## 2024-08-28 DIAGNOSIS — I10 HYPERTENSION, UNSPECIFIED TYPE: ICD-10-CM

## 2024-08-28 DIAGNOSIS — I06.0 RHEUMATIC AORTIC STENOSIS: ICD-10-CM

## 2024-08-28 DIAGNOSIS — I10 ESSENTIAL HYPERTENSION: ICD-10-CM

## 2024-08-28 DIAGNOSIS — Z79.01 CHRONIC ANTICOAGULATION: ICD-10-CM

## 2024-08-28 DIAGNOSIS — I48.0 PAROXYSMAL ATRIAL FIBRILLATION (HCC): ICD-10-CM

## 2024-08-28 RX ORDER — LISINOPRIL 2.5 MG/1
2.5 TABLET ORAL DAILY
Qty: 100 TABLET | Refills: 3 | Status: SHIPPED | OUTPATIENT
Start: 2024-08-28

## 2024-08-28 ASSESSMENT — ENCOUNTER SYMPTOMS
MYALGIAS: 0
BACK PAIN: 1
FEVER: 0
BRUISES/BLEEDS EASILY: 0
DIZZINESS: 0
NAUSEA: 0
PALPITATIONS: 0
PND: 0
CHILLS: 0
NERVOUS/ANXIOUS: 0
COUGH: 0
ABDOMINAL PAIN: 0
SHORTNESS OF BREATH: 0
HEADACHES: 0
ORTHOPNEA: 0
LOSS OF CONSCIOUSNESS: 0

## 2024-08-28 ASSESSMENT — FIBROSIS 4 INDEX
FIB4 SCORE: 3.09
FIB4 SCORE: 3.09

## 2024-08-28 NOTE — PROGRESS NOTES
Chief Complaint   Patient presents with    Follow-Up    Pacemaker Check/Dysfunction    AV Block Complete    Atrial Fibrillation    Anticoagulation    Aortic Stenosis    HTN (Controlled)    Hyperlipidemia       Subjective     Jacobo Bowling is a 78 y.o. male who presents today for six month follow-up for PM check, AV block with PAFib, anticoagulation, mild AS, HTN, hyperlipidemia, and FRANCHESCA.    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     In summer 2022, we tried him on Lipitor for elevated lipids, but he developed severe diarrhea; he does not want to retry any other lipid options.     In May 2023, he had minimally invasive back surgery by Dr. Berry, which went went. He is still having some mild sciatica, and had tried cortisone injections. He does have follow-up with orthopedics soon.    He is here today for six month follow-up. Generally, he is doing well, and he had a good summer. He denies any chest pain, pressure, tightness or discomfort; no symptomatic palpitations or racing of his heart; no shortness of breath, orthopnea or PND; no dizziness or syncope; no LE edema. BP has been running 120-130 systolic at home.     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 Normal RA and RV, mildly dilated LA. Mild MR, mild AS (Vmax 2.2m/s, peak 20mmHg, mean 11mmHg, BRE 1.4cm2), mild TR. RVSP 40mmHg.    Rheumatic fever     Snoring      Past Surgical History:   Procedure Laterality Date    PB EXCIS TENDON SHEATH LESION, HAND/FINGER Left 2021    Procedure: EXCISION BIOPSY LEFT WRIST WITH CULTURES;  Surgeon: Emiliano Dawkins M.D.;  Location: SURGERY [place];  Service: Orthopedics    MO NEUROPLASTY & OR TRANSPOS MEDIAN NRV CARPAL ZULEMA Left 12/3/2020    Procedure: LEFT CARPAL TUNNEL RELEASE, LEFT GANGLION CYST EXCISION;  Surgeon: Emiliano Dawkins M.D.;  Location: SURGERY [place];  Service: Orthopedics    PACEMAKER INSERTION Left 2020    [employer] Accolade MRI L311 implanted by Dr. Cantrell.    PB RECONSTR TOTAL SHOULDER IMPLANT Right 2020    Procedure: RT ARTHROPLASTY, SHOULDER, TOTAL;  Surgeon: Arsenio Hernandez M.D.;  Location: SURGERY [facility];  Service: Orthopedics    OTHER      left eye surgery     OTHER ORTHOPEDIC SURGERY      bilat RCR, shoulders, carpal tunnel surgery     Family History   Problem Relation Age of Onset    Cancer Mother         Breast,  at 62    Stroke Father         CVA age 67     Social History     Socioeconomic History    Marital status:      Spouse name: Not on file    Number of children: Not on file    Years of education: Not on file    Highest education level: Not on file   Occupational History    Not on file   Tobacco Use    Smoking status: Former     Current packs/day: 0.30     Average packs/day: 0.3 packs/day for 31.7 years (9.5 ttl pk-yrs)     Types: Cigarettes     Start date:     Smokeless tobacco: Never    Tobacco comments:     4 daily, pt states he does not inhale   Vaping Use    Vaping status: Never Used   Substance and Sexual Activity    Alcohol use: Yes     Alcohol/week: 1.8 oz     Types: 1 Cans of beer, 2 Shots of liquor per week     Comment: Rarely    Drug use: No     Sexual activity: Not on file   Other Topics Concern    Not on file   Social History Narrative    Not on file     Social Determinants of Health     Financial Resource Strain: Not on file   Food Insecurity: Not on file   Transportation Needs: Not on file   Physical Activity: Not on file   Stress: Not on file   Social Connections: Not on file   Intimate Partner Violence: Not on file   Housing Stability: Not on file     Allergies   Allergen Reactions    Atorvastatin Diarrhea     Sever diarrhea, does not want to try again     Outpatient Encounter Medications as of 8/28/2024   Medication Sig Dispense Refill    apixaban (ELIQUIS) 5mg Tab Take 1 Tablet by mouth 2 times a day. 200 Tablet 3    lisinopril (PRINIVIL) 2.5 MG Tab Take 1 Tablet by mouth every day. 100 Tablet 3    [DISCONTINUED] lisinopril (PRINIVIL) 2.5 MG Tab TAKE 1 TABLET DAILY 90 Tablet 1    [DISCONTINUED] triamcinolone acetonide (KENALOG) 0.1 % Cream  (Patient not taking: Reported on 8/28/2024)      amoxicillin-clavulanate (AUGMENTIN) 875-125 MG Tab TAKE 1 TABLET BY MOUTH 30 MINUTES PRIOR TO PROCEDURE      hydrocortisone rectal (PERIANAL) 2.5% Cream Apply small amount to anus daily 30 g 5    [DISCONTINUED] apixaban (ELIQUIS) 5mg Tab Take 1 Tablet by mouth 2 times a day. 200 Tablet 3    [DISCONTINUED] gabapentin (NEURONTIN) 300 MG Cap TAKE 1 CAPSULE BY MOUTH TWICE DAILY FOR 3 DAYS. MAY INCREASE TO 3 TIMES A DAY THEREAFTER (Patient not taking: Reported on 5/30/2024)      multivitamin (THERAGRAN) Tab Take 1 Tab by mouth every day.       No facility-administered encounter medications on file as of 8/28/2024.     Review of Systems   Constitutional:  Negative for chills and fever.   HENT:  Negative for congestion.    Respiratory:  Negative for cough and shortness of breath.    Cardiovascular:  Negative for chest pain, palpitations, orthopnea, leg swelling and PND.   Gastrointestinal:  Negative for abdominal pain and nausea.   Musculoskeletal:  Positive for back  "pain. Negative for myalgias.        Status post back surgery in May 2023 (Dr. Berry)   Skin:  Negative for rash.   Neurological:  Negative for dizziness, loss of consciousness and headaches.   Endo/Heme/Allergies:  Does not bruise/bleed easily.   Psychiatric/Behavioral:  The patient is not nervous/anxious.               Objective     /70 (BP Location: Left arm, Patient Position: Sitting, BP Cuff Size: Adult)   Pulse 60   Resp 14   Ht 1.778 m (5' 10\")   Wt 94.3 kg (208 lb)   SpO2 93%   BMI 29.84 kg/m²     Physical Exam  Constitutional:       Appearance: He is well-developed.   HENT:      Head: Normocephalic.   Neck:      Vascular: No JVD.   Cardiovascular:      Rate and Rhythm: Normal rate and regular rhythm.      Heart sounds: Murmur heard.      Systolic murmur is present with a grade of 2/6.      Comments: PM in left chest wall.  Murmur at RUSB.  Pulmonary:      Effort: Pulmonary effort is normal. No respiratory distress.      Breath sounds: Normal breath sounds. No wheezing or rales.   Abdominal:      General: Bowel sounds are normal. There is no distension.      Palpations: Abdomen is soft.      Tenderness: There is no abdominal tenderness.   Musculoskeletal:         General: Normal range of motion.      Cervical back: Normal range of motion and neck supple.   Skin:     General: Skin is warm and dry.      Findings: No rash.   Neurological:      Mental Status: He is alert and oriented to person, place, and time.       PM interrogation today reveals normal function. Presenting rhythm is sinus. 45 mode switching episodes (9.9 hours total). No changes are made today. Battery is good for 3.5 years.    CONCLUSIONS OF TTE OF 1/18/2024:  Compared to the images of the prior study on 03/31/2022 - there is no   significant change.  Normal left ventricular size and systolic function.  The left ventricular ejection fraction is visually estimated to be 65%.  Grade II diastolic dysfunction.  Normal right ventricular " size and systolic function.  Mild mitral regurgitation.  Mild aortic valve stenosis.  Mild tricuspid regurgitation.  Estimated right ventricular systolic pressure is 40 mmHg.     CONCLUSIONS OF ECHOCARDIOGRAM OF 3/31/2022:  The left ventricular ejection fraction is visually estimated to be 65-70%.  Pacer/ICD wire seen in the right ventricle.  Calcified aortic valve leaflets with mild aortic stenosis: Vmax is 2.7 m/s, MG 15 mmHg, BRE 2 cm2.  Mild tricuspid regurgitation.  Right ventricular systolic pressure is estimated to be 39 mmHg.  Compared to the prior echo on 12/02/2020, no significant change, aortic stenosis appears mild.     CONCLUSIONS OF ECHOCARDIOGRAM OF 12/2/2020:  Left ventricular ejection fraction is visually estimated to be 55-60%.  Grade II diastolic dysfunction.  Pacer/ICD wire seen in right ventricle.  Moderately dilated left atrium.  Calcified aortic valve leaflets with mild to moderate aortic   stenosis:Vmax is 2.2 m/s, BRE 1.2 cm2, MG 12 mmHg, DI 0.5.  Unable to estimate pulmonary artery pressure, normal estimated right atrial pressure.  Compared to prior Echo - 12/18/19, no significant change.     Lab Results   Component Value Date/Time    CHOLSTRLTOT 204 (H) 01/18/2024 09:49 AM     (H) 01/18/2024 09:49 AM    HDL 51.0 01/18/2024 09:49 AM    TRIGLYCERIDE 149 01/18/2024 09:49 AM        Lab Results   Component Value Date/Time    ASTSGOT 30 01/18/2024 09:49 AM    ALTSGPT 30 01/18/2024 09:49 AM       Lab Results   Component Value Date/Time    SODIUM 141 01/18/2024 09:49 AM    POTASSIUM 5.0 01/18/2024 09:49 AM    CHLORIDE 104 01/18/2024 09:49 AM    CO2 28 01/18/2024 09:49 AM    GLUCOSE 96 01/18/2024 09:49 AM    BUN 18 01/18/2024 09:49 AM    CREATININE 1.2 01/18/2024 09:49 AM    BUNCREATRAT 13 01/24/2020 06:50 AM    GLOMRATE 59 (L) 05/19/2023 09:53 AM      Assessment & Plan     1. Cardiac pacemaker in situ        2. AV block, complete (HCC)        3. Paroxysmal atrial fibrillation (HCC)         4. Chronic anticoagulation  apixaban (ELIQUIS) 5mg Tab      5. Rheumatic aortic stenosis        6. Essential hypertension  lisinopril (PRINIVIL) 2.5 MG Tab      7. Hypertension, unspecified type        8. Mixed hyperlipidemia            Medical Decision Making: Today's Assessment/Status/Plan:        1. High AV block, with PAFib, with PPM, which is working normally. 45 mode switching episodes (9.9 hours total, <1% of total time, asymptomatic). We will continue to monitor over time. No changes are made today.     2. Chronic anticoagulation with Eliquis. No bleeding problems.     3. Mild aortic stenosis on echo in January 2024, stable from 2022.     4. Hypertension, treated with Lisinopril, stable. BP is good today.     5. Hyperlipidemia, not currently on any therapy, unable to tolerate statins; he is not interested in other treatment. Lipids are stable.      6. Low back pain, status post minimally invasive surgery in 2023, stable. He is having sciatica again, and plans to see orthopedics again soon.     He is doing well, and remains stable.   We reviewed labs and echo results.  Same medications, which are all renewed x 1 year.     Follow-up in 6 months for next PM check; follow-up sooner if clinical condition changes.

## 2024-10-14 ENCOUNTER — NON-PROVIDER VISIT (OUTPATIENT)
Dept: CARDIOLOGY | Facility: MEDICAL CENTER | Age: 79
End: 2024-10-14
Payer: MEDICARE

## 2024-10-15 ENCOUNTER — TELEPHONE (OUTPATIENT)
Dept: CARDIOLOGY | Facility: MEDICAL CENTER | Age: 79
End: 2024-10-15
Payer: MEDICARE

## 2024-10-15 PROCEDURE — 93294 REM INTERROG EVL PM/LDLS PM: CPT | Performed by: INTERNAL MEDICINE

## 2025-01-18 ENCOUNTER — NON-PROVIDER VISIT (OUTPATIENT)
Dept: CARDIOLOGY | Facility: MEDICAL CENTER | Age: 80
End: 2025-01-18
Payer: MEDICARE

## 2025-01-20 PROCEDURE — 93294 REM INTERROG EVL PM/LDLS PM: CPT | Performed by: INTERNAL MEDICINE

## 2025-03-05 ENCOUNTER — TELEPHONE (OUTPATIENT)
Dept: CARDIOLOGY | Facility: MEDICAL CENTER | Age: 80
End: 2025-03-05

## 2025-03-05 ENCOUNTER — APPOINTMENT (OUTPATIENT)
Dept: CARDIOLOGY | Facility: PHYSICIAN GROUP | Age: 80
End: 2025-03-05
Payer: MEDICARE

## 2025-03-05 VITALS
BODY MASS INDEX: 30.06 KG/M2 | HEART RATE: 71 BPM | OXYGEN SATURATION: 95 % | SYSTOLIC BLOOD PRESSURE: 112 MMHG | RESPIRATION RATE: 14 BRPM | WEIGHT: 210 LBS | DIASTOLIC BLOOD PRESSURE: 64 MMHG | HEIGHT: 70 IN

## 2025-03-05 VITALS
RESPIRATION RATE: 14 BRPM | HEART RATE: 71 BPM | BODY MASS INDEX: 30.06 KG/M2 | DIASTOLIC BLOOD PRESSURE: 64 MMHG | HEIGHT: 70 IN | SYSTOLIC BLOOD PRESSURE: 112 MMHG | OXYGEN SATURATION: 95 % | WEIGHT: 210 LBS

## 2025-03-05 DIAGNOSIS — Z79.01 CHRONIC ANTICOAGULATION: ICD-10-CM

## 2025-03-05 DIAGNOSIS — I44.2 AV BLOCK, COMPLETE (HCC): ICD-10-CM

## 2025-03-05 DIAGNOSIS — E78.2 MIXED HYPERLIPIDEMIA: ICD-10-CM

## 2025-03-05 DIAGNOSIS — D69.6 THROMBOCYTOPENIA (HCC): ICD-10-CM

## 2025-03-05 DIAGNOSIS — I06.0 RHEUMATIC AORTIC STENOSIS: ICD-10-CM

## 2025-03-05 DIAGNOSIS — I48.0 PAROXYSMAL ATRIAL FIBRILLATION (HCC): ICD-10-CM

## 2025-03-05 DIAGNOSIS — Z95.0 CARDIAC PACEMAKER IN SITU: ICD-10-CM

## 2025-03-05 DIAGNOSIS — I10 HYPERTENSION, UNSPECIFIED TYPE: ICD-10-CM

## 2025-03-05 PROCEDURE — 99214 OFFICE O/P EST MOD 30 MIN: CPT | Performed by: NURSE PRACTITIONER

## 2025-03-05 PROCEDURE — 3074F SYST BP LT 130 MM HG: CPT | Performed by: NURSE PRACTITIONER

## 2025-03-05 PROCEDURE — 93288 INTERROG EVL PM/LDLS PM IP: CPT | Performed by: NURSE PRACTITIONER

## 2025-03-05 PROCEDURE — 3078F DIAST BP <80 MM HG: CPT | Performed by: NURSE PRACTITIONER

## 2025-03-05 RX ORDER — AMIODARONE HYDROCHLORIDE 200 MG/1
200 TABLET ORAL DAILY
Qty: 30 TABLET | Refills: 1 | Status: SHIPPED | OUTPATIENT
Start: 2025-03-05

## 2025-03-05 ASSESSMENT — ENCOUNTER SYMPTOMS
ABDOMINAL PAIN: 0
LOSS OF CONSCIOUSNESS: 0
HEADACHES: 0
BRUISES/BLEEDS EASILY: 0
COUGH: 0
ORTHOPNEA: 0
PALPITATIONS: 0
CHILLS: 0
MYALGIAS: 0
NERVOUS/ANXIOUS: 0
DIZZINESS: 0
BACK PAIN: 1
FEVER: 0
PND: 0
NAUSEA: 0
SHORTNESS OF BREATH: 0

## 2025-03-05 ASSESSMENT — FIBROSIS 4 INDEX
FIB4 SCORE: 3.12
FIB4 SCORE: 3.12

## 2025-03-05 NOTE — PROGRESS NOTES
Chief Complaint   Patient presents with    Follow-Up    Pacemaker Check/Dysfunction    AV Block Complete    Atrial Fibrillation    Anticoagulation    HTN (Controlled)    Hyperlipidemia    Aortic Stenosis       Subjective     Jacobo Bowling is a 79 y.o. male who presents today for six month follow-up for PM check, AV block, PAFib, anticoagulation, mild AS, HTN and hyperlipidemia.    Jacobo is a 79 year old male with history of high AV block with PM since July 2020, PAFib, anticoagulation with Eliquis, mild AS (with history of rheumatic fever as a teen), hypertension, hyperlipidemia (intolerant to statins) and FRANCHESCA (not treated), last seen by me in August 2024.     In Summer 2022, we tried him on Lipitor for elevated lipids, but he developed severe diarrhea; he does not want to retry any other lipid options.     In May 2023, he had minimally invasive back surgery by Dr. Berry, which went went. He is still having some ongoing sciatica.    In October 2024, his remote monitor picked up persistent AFib; because he was asymptomatic, there was no sooner follow-up. He is anticoagulated with Eliquis, and he takes it consistently.    He is here today for six month follow-up. Generally, he is doing well. His main issue is doing back pain.    He denies any chest pain, pressure, tightness or discomfort; no symptomatic palpitations or racing of his heart; no shortness of breath, orthopnea or PND; no dizziness or syncope; no LE edema.     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 Normal RA and RV, mildly dilated LA. Mild MR, mild AS (Vmax 2.2m/s, peak 20mmHg, mean 11mmHg, BRE 1.4cm2), mild TR. RVSP 40mmHg.    Rheumatic fever     Snoring      Past Surgical History:   Procedure Laterality Date    PB EXCIS TENDON SHEATH LESION, HAND/FINGER Left 2021    Procedure: EXCISION BIOPSY LEFT WRIST WITH CULTURES;  Surgeon: Emiliano Dawkins M.D.;  Location: [facility] [place];  Service: Orthopedics    CO NEUROPLASTY & OR TRANSPOS MEDIAN NRV CARPAL ZULEMA Left 12/3/2020    Procedure: LEFT CARPAL TUNNEL RELEASE, LEFT GANGLION CYST EXCISION;  Surgeon: Emiliano Dawkins M.D.;  Location: SURGERY [place];  Service: Orthopedics    PACEMAKER INSERTION Left 2020    [employer] Accolade MRI L311 implanted by Dr. Cantrell.    PB RECONSTR TOTAL SHOULDER IMPLANT Right 2020    Procedure: RT ARTHROPLASTY, SHOULDER, TOTAL;  Surgeon: Arsenio Hernandez M.D.;  Location: SURGERY [facility];  Service: Orthopedics    OTHER      left eye surgery     OTHER ORTHOPEDIC SURGERY      bilat RCR, shoulders, carpal tunnel surgery     Family History   Problem Relation Age of Onset    Cancer Mother         Breast,  at 62    Stroke Father         CVA age 67     Social History     Socioeconomic History    Marital status:      Spouse name: Not on file    Number of children: Not on file    Years of education: Not on file    Highest education level: Not on file   Occupational History    Not on file   Tobacco Use    Smoking status: Former     Current packs/day: 0.30     Average packs/day: 0.3 packs/day for 32.2 years (9.7 ttl pk-yrs)     Types: Cigarettes     Start date:     Smokeless tobacco: Never    Tobacco comments:     4 daily, pt states he does not inhale   Vaping Use    Vaping status: Never Used   Substance and Sexual Activity    Alcohol use: Yes     Alcohol/week: 1.8 oz     Types: 1 Cans of  beer, 2 Shots of liquor per week     Comment: Rarely    Drug use: No    Sexual activity: Not on file   Other Topics Concern    Not on file   Social History Narrative    Not on file     Social Drivers of Health     Financial Resource Strain: Not on file   Food Insecurity: Not on file   Transportation Needs: Not on file   Physical Activity: Not on file   Stress: Not on file   Social Connections: Not on file   Intimate Partner Violence: Not on file   Housing Stability: Not on file     Allergies   Allergen Reactions    Atorvastatin Diarrhea     Sever diarrhea, does not want to try again     Outpatient Encounter Medications as of 3/5/2025   Medication Sig Dispense Refill    amiodarone (CORDARONE) 200 MG Tab Take 1 Tablet by mouth every day. 30 Tablet 1    [DISCONTINUED] promethazine-dextromethorphan (PROMETHAZINE-DM) 6.25-15 MG/5ML syrup Take 1 teaspoonful every 6 hours for cough (Patient not taking: Reported on 3/5/2025) 180 mL 0    [DISCONTINUED] predniSONE (DELTASONE) 20 MG Tab Take 1 tablet daily for 5 days (Patient not taking: Reported on 3/5/2025) 18 Tablet 0    apixaban (ELIQUIS) 5mg Tab Take 1 Tablet by mouth 2 times a day. 200 Tablet 3    lisinopril (PRINIVIL) 2.5 MG Tab Take 1 Tablet by mouth every day. 100 Tablet 3    amoxicillin-clavulanate (AUGMENTIN) 875-125 MG Tab TAKE 1 TABLET BY MOUTH 30 MINUTES PRIOR TO PROCEDURE      hydrocortisone rectal (PERIANAL) 2.5% Cream Apply small amount to anus daily 30 g 5    multivitamin (THERAGRAN) Tab Take 1 Tab by mouth every day.       No facility-administered encounter medications on file as of 3/5/2025.     Review of Systems   Constitutional:  Negative for chills and fever.   HENT:  Negative for congestion.    Respiratory:  Negative for cough and shortness of breath.    Cardiovascular:  Negative for chest pain, palpitations, orthopnea, leg swelling and PND.   Gastrointestinal:  Negative for abdominal pain and nausea.   Musculoskeletal:  Positive for back pain. Negative  "for myalgias.        Status post back surgery in May 2023 (Dr. Berry)   Skin:  Negative for rash.   Neurological:  Negative for dizziness, loss of consciousness and headaches.   Endo/Heme/Allergies:  Does not bruise/bleed easily.   Psychiatric/Behavioral:  The patient is not nervous/anxious.               Objective     /64 (BP Location: Left arm, Patient Position: Sitting, BP Cuff Size: Adult)   Pulse 71   Resp 14   Ht 1.778 m (5' 10\")   Wt 95.3 kg (210 lb)   SpO2 95%   BMI 30.13 kg/m²     Physical Exam  Constitutional:       Appearance: He is well-developed.   HENT:      Head: Normocephalic.   Neck:      Vascular: No JVD.   Cardiovascular:      Rate and Rhythm: Normal rate and regular rhythm.      Heart sounds: Murmur heard.      Systolic murmur is present with a grade of 2/6.      Comments: PM in left chest wall.  Murmur at RUSB.  Pulmonary:      Effort: Pulmonary effort is normal. No respiratory distress.      Breath sounds: Normal breath sounds. No wheezing or rales.   Abdominal:      General: Bowel sounds are normal. There is no distension.      Palpations: Abdomen is soft.      Tenderness: There is no abdominal tenderness.   Musculoskeletal:         General: Normal range of motion.      Cervical back: Normal range of motion and neck supple.   Skin:     General: Skin is warm and dry.      Findings: No rash.      Comments: Skin changes of chronic venous stasis.   Neurological:      Mental Status: He is alert and oriented to person, place, and time.       PM interrogation today reveals normal function. Presenting rhythm is atrial fibrillation, rate controlled, persistent since October 2024. No changes are made today. Battery is good for 2 years. We discussed DCCV, and he would like to proceed.    ECHOCARDIOGRAPHY:    CONCLUSIONS OF TTE OF 1/18/2024:  Compared to the images of the prior study on 03/31/2022 - there is no   significant change.  Normal left ventricular size and systolic function.  The " left ventricular ejection fraction is visually estimated to be 65%.  Grade II diastolic dysfunction.  Normal right ventricular size and systolic function.  Mild mitral regurgitation.  Mild aortic valve stenosis.  Mild tricuspid regurgitation.  Estimated right ventricular systolic pressure is 40 mmHg.     CONCLUSIONS OF ECHOCARDIOGRAM OF 3/31/2022:  The left ventricular ejection fraction is visually estimated to be 65-70%.  Pacer/ICD wire seen in the right ventricle.  Calcified aortic valve leaflets with mild aortic stenosis: Vmax is 2.7 m/s, MG 15 mmHg, BRE 2 cm2.  Mild tricuspid regurgitation.  Right ventricular systolic pressure is estimated to be 39 mmHg.  Compared to the prior echo on 12/02/2020, no significant change, aortic stenosis appears mild.     CONCLUSIONS OF ECHOCARDIOGRAM OF 12/2/2020:  Left ventricular ejection fraction is visually estimated to be 55-60%.  Grade II diastolic dysfunction.  Pacer/ICD wire seen in right ventricle.  Moderately dilated left atrium.  Calcified aortic valve leaflets with mild to moderate aortic   stenosis:Vmax is 2.2 m/s, BRE 1.2 cm2, MG 12 mmHg, DI 0.5.  Unable to estimate pulmonary artery pressure, normal estimated right atrial pressure.  Compared to prior Echo - 12/18/19, no significant change.     CT SCANS:    IMPRESSION OF CT SCAN OF CHEST OF 6/7/2021:  1.  Coronary atherosclerosis.  2.  No pathologic adenopathy is visualized.  3.  Small hiatal hernia.     LABS:    Lab Results   Component Value Date/Time    CHOLSTRLTOT 204 (H) 01/18/2024 09:49 AM     (H) 01/18/2024 09:49 AM    HDL 51.0 01/18/2024 09:49 AM    TRIGLYCERIDE 149 01/18/2024 09:49 AM        Lab Results   Component Value Date/Time    ASTSGOT 30 01/18/2024 09:49 AM    ALTSGPT 30 01/18/2024 09:49 AM       Lab Results   Component Value Date/Time    SODIUM 141 01/18/2024 09:49 AM    POTASSIUM 5.0 01/18/2024 09:49 AM    CHLORIDE 104 01/18/2024 09:49 AM    CO2 28 01/18/2024 09:49 AM    GLUCOSE 96 01/18/2024  09:49 AM    BUN 18 01/18/2024 09:49 AM    CREATININE 1.2 01/18/2024 09:49 AM    BUNCREATRAT 13 01/24/2020 06:50 AM    GLOMRATE 59 (L) 05/19/2023 09:53 AM      Assessment & Plan     1. Cardiac pacemaker in situ        2. AV block, complete (HCC)        3. Paroxysmal atrial fibrillation (HCC)  amiodarone (CORDARONE) 200 MG Tab    TSH      4. Chronic anticoagulation        5. Rheumatic aortic stenosis        6. Hypertension, unspecified type  CBC WITHOUT DIFFERENTIAL      7. Mixed hyperlipidemia  Comp Metabolic Panel    Lipid Profile      8. Thrombocytopenia (HCC)  CBC WITHOUT DIFFERENTIAL          Medical Decision Making: Today's Assessment/Status/Plan:        High AV block, with PPM, with persistent AFib since October 2024. He is mostly asymptomatic, but would like to pursue DCCV. He is consistent with Eliquis doses. Will start Amiodarone 200mg once daily 1 week prior, and then follow-up with me 3-4 weeks later.  Chronic anticoagulation with Eliquis. No bleeding problems. Will check CBC.  Mild aortic stenosis, stable on echo in January 2024; consider repeat echo next year.  Hypertension, treated with Lisinopril 2.5mg once daily. BP is good today.  Hyperlipidemia/coronary atherosclerosis seen on CT scan. He is intolerant to statins, and does not want to try other options; he understands his risk.  Thrombocytopenia, to repeat CBC.    As above, will schedule DCCV.  Start Amiodarone 1 week prior.  Follow-up with me 3-4 weeks later for PM recheck.    Same medications for now.  Labs as above.

## 2025-03-05 NOTE — TELEPHONE ENCOUNTER
CHASE River.  Gaviota Quezada, Med Ass't; Hayley Moore  Can one of you please call this patient to set up DCCV?  He lives in [place], and it can be done at  if possible.  Order is in Epic.    He is on Eliquis, and take consistently.  I am having him start Amiodarone 1 week prior to procedure; he has Rx.    Please set up PM check with me 3-4 weeks later to check to make sure he is maintaining regular rhythm.    Thank you!  AB

## 2025-03-06 NOTE — TELEPHONE ENCOUNTER
Patient is scheduled for a Cardioversion with anesthesia on 03- with Dr. Garcia. Patient   has been instructed to check in at 8:00 am for 10:00 procedure. No meds to hold. Patient has been advised they will need a  home and with them after since they are sedated. Message sent to authorizations. Sent [employer] message to pt with instructions. Dejuan at [employer] notified of case. FYI sent to Jose and Anuja.     Patient did advise he doesn't like the IV anesthesia

## 2025-03-07 ENCOUNTER — APPOINTMENT (OUTPATIENT)
Dept: ADMISSIONS | Facility: MEDICAL CENTER | Age: 80
End: 2025-03-07
Attending: INTERNAL MEDICINE
Payer: MEDICARE

## 2025-03-07 ENCOUNTER — RESULTS FOLLOW-UP (OUTPATIENT)
Dept: CARDIOLOGY | Facility: MEDICAL CENTER | Age: 80
End: 2025-03-07
Payer: MEDICARE

## 2025-03-07 NOTE — TELEPHONE ENCOUNTER
Noted recommendations AB. See below,    ----- Message from Nurse Practitioner DANNY River sent at 3/7/2025 10:32 AM PST -----  Labs are stable. He has DCCV scheduled for 3/20/2025 and should keep this.  Would have him FU with me in CC 2-3 weeks after DCCV to make sure he is maintaining sinus rhythm.  Thanks, AB    ========================================    Phone Number Called: [phone]     Call outcome: Spoke to patient regarding message below.Pt verbalized understanding.     Message: Called to update pt on recommendations from AB. See above.       To schedulers please assist with scheduling pt per AB recommendations. Thank you

## 2025-03-13 ENCOUNTER — PRE-ADMISSION TESTING (OUTPATIENT)
Dept: ADMISSIONS | Facility: MEDICAL CENTER | Age: 80
End: 2025-03-13
Attending: INTERNAL MEDICINE
Payer: MEDICARE

## 2025-03-13 NOTE — PREADMIT AVS NOTE
Current Medications   Medication Instructions    amiodarone (CORDARONE) 200 MG Tab   per M.D. instructions: begin this medication 1 week prior to your procedure    apixaban (ELIQUIS) 5mg Tab Per M.D. instructions: continue to take this medication prior to your procedure    lisinopril (PRINIVIL) 2.5 MG Tab Stop 24 hours before surgery    multivitamin (THERAGRAN) Tab Stop 7 days before surgery

## 2025-03-19 ENCOUNTER — ANESTHESIA EVENT (OUTPATIENT)
Dept: CARDIOLOGY | Facility: MEDICAL CENTER | Age: 80
End: 2025-03-19
Payer: MEDICARE

## 2025-03-20 ENCOUNTER — ANESTHESIA (OUTPATIENT)
Dept: CARDIOLOGY | Facility: MEDICAL CENTER | Age: 80
End: 2025-03-20
Payer: MEDICARE

## 2025-03-20 ENCOUNTER — HOSPITAL ENCOUNTER (OUTPATIENT)
Facility: MEDICAL CENTER | Age: 80
End: 2025-03-20
Attending: INTERNAL MEDICINE | Admitting: INTERNAL MEDICINE
Payer: MEDICARE

## 2025-03-20 ENCOUNTER — APPOINTMENT (OUTPATIENT)
Dept: CARDIOLOGY | Facility: MEDICAL CENTER | Age: 80
End: 2025-03-20
Attending: NURSE PRACTITIONER
Payer: MEDICARE

## 2025-03-20 VITALS
SYSTOLIC BLOOD PRESSURE: 139 MMHG | BODY MASS INDEX: 31.84 KG/M2 | RESPIRATION RATE: 18 BRPM | OXYGEN SATURATION: 96 % | TEMPERATURE: 97.3 F | DIASTOLIC BLOOD PRESSURE: 70 MMHG | HEIGHT: 69 IN | WEIGHT: 214.95 LBS | HEART RATE: 59 BPM

## 2025-03-20 DIAGNOSIS — I48.0 PAROXYSMAL ATRIAL FIBRILLATION (HCC): ICD-10-CM

## 2025-03-20 DIAGNOSIS — Z95.0 CARDIAC PACEMAKER IN SITU: ICD-10-CM

## 2025-03-20 DIAGNOSIS — Z79.01 CHRONIC ANTICOAGULATION: ICD-10-CM

## 2025-03-20 DIAGNOSIS — I44.2 AV BLOCK, COMPLETE (HCC): ICD-10-CM

## 2025-03-20 LAB
EKG IMPRESSION: NORMAL
EKG IMPRESSION: NORMAL

## 2025-03-20 PROCEDURE — 93005 ELECTROCARDIOGRAM TRACING: CPT | Mod: TC | Performed by: STUDENT IN AN ORGANIZED HEALTH CARE EDUCATION/TRAINING PROGRAM

## 2025-03-20 PROCEDURE — 160002 HCHG RECOVERY MINUTES (STAT)

## 2025-03-20 PROCEDURE — 93005 ELECTROCARDIOGRAM TRACING: CPT | Mod: TC | Performed by: INTERNAL MEDICINE

## 2025-03-20 PROCEDURE — 92960 CARDIOVERSION ELECTRIC EXT: CPT | Performed by: INTERNAL MEDICINE

## 2025-03-20 PROCEDURE — 700105 HCHG RX REV CODE 258: Performed by: STUDENT IN AN ORGANIZED HEALTH CARE EDUCATION/TRAINING PROGRAM

## 2025-03-20 PROCEDURE — 700111 HCHG RX REV CODE 636 W/ 250 OVERRIDE (IP): Performed by: STUDENT IN AN ORGANIZED HEALTH CARE EDUCATION/TRAINING PROGRAM

## 2025-03-20 PROCEDURE — 160015 HCHG STAT PREOP MINUTES

## 2025-03-20 PROCEDURE — 4410588 CL-CARDIOVERSION

## 2025-03-20 PROCEDURE — 160035 HCHG PACU - 1ST 60 MINS PHASE I

## 2025-03-20 PROCEDURE — 160046 HCHG PACU - 1ST 60 MINS PHASE II

## 2025-03-20 RX ORDER — SODIUM CHLORIDE, SODIUM LACTATE, POTASSIUM CHLORIDE, CALCIUM CHLORIDE 600; 310; 30; 20 MG/100ML; MG/100ML; MG/100ML; MG/100ML
INJECTION, SOLUTION INTRAVENOUS
Status: DISCONTINUED | OUTPATIENT
Start: 2025-03-20 | End: 2025-03-20 | Stop reason: SURG

## 2025-03-20 RX ADMIN — PROPOFOL 70 MG: 10 INJECTION, EMULSION INTRAVENOUS at 09:35

## 2025-03-20 RX ADMIN — SODIUM CHLORIDE, POTASSIUM CHLORIDE, SODIUM LACTATE AND CALCIUM CHLORIDE: 600; 310; 30; 20 INJECTION, SOLUTION INTRAVENOUS at 09:24

## 2025-03-20 ASSESSMENT — FIBROSIS 4 INDEX: FIB4 SCORE: 2.47

## 2025-03-20 ASSESSMENT — PAIN SCALES - GENERAL: PAIN_LEVEL: 0

## 2025-03-20 NOTE — ANESTHESIA POSTPROCEDURE EVALUATION
Patient: Jacobo Bowling    Procedure Summary       Date: 03/20/25 Room / Location: [facility] - Echocardiology [facility]    Anesthesia Start: 0924 Anesthesia Stop: 0946    Procedure: CL-CARDIOVERSION Diagnosis:       Cardiac pacemaker in situ      AV block, complete (HCC)      Paroxysmal atrial fibrillation (HCC)      Chronic anticoagulation      (See Associated Dx)    Scheduled Providers: Romie Garcia M.D.; Deepali Sanchez M.D. Responsible Provider: Deepali Sanchez M.D.    Anesthesia Type: MAC ASA Status: 2            Final Anesthesia Type: MAC  Last vitals  BP   Blood Pressure : 123/60    Temp   35.9 °C (96.6 °F)    Pulse   60   Resp   16    SpO2   98 %      Anesthesia Post Evaluation    Patient location during evaluation: PACU  Patient participation: complete - patient participated  Level of consciousness: awake and alert  Pain score: 0    Airway patency: patent  Anesthetic complications: no  Cardiovascular status: hemodynamically stable  Respiratory status: acceptable  Hydration status: euvolemic    PONV: none          No notable events documented.     Nurse Pain Score: 0 (NPRS)

## 2025-03-20 NOTE — OR NURSING
0941 - Patient arrival to PPU after cardioversion. Assessed patient with Imani DELGADO and Dr. Sanchez. Pt wakes to verbal. VSS. Denies pain at this time. Educated patient on plan of care  0945 - Updated patient's wife Amy  0948 - Family at bedside, with belongings. Tolerating oral intake.  1007 - EKG at bedside  1022 - Discharge instructions given; discussed diet, activity, medications, dressing care, follow up care, and worsening symptoms. Pt and Amy verbalized understanding and questions answered.  1033 - Pt's VSS; denies N/V; patient out of bed, denies discomfort.  IV dc'd. Patient states ready to d/c home.  Pt taken down in stable condition with wife and all belongings present.

## 2025-03-20 NOTE — ANESTHESIA TIME REPORT
Anesthesia Start and Stop Event Times       Date Time Event    3/20/2025 0915 Ready for Procedure     0924 Anesthesia Start     0946 Anesthesia Stop          Responsible Staff  03/20/25      Name Role Begin End    Deepali Sanchez M.D. Anesth 0924 0946          Overtime Reason:  no overtime (within assigned shift)    Comments:

## 2025-03-20 NOTE — DISCHARGE INSTRUCTIONS
What to Expect Post Anesthesia    Rest and take it easy for the first 24 hours.  A responsible adult is recommended to remain with you during that time.  It is normal to feel sleepy.  We encourage you to not do anything that requires balance, judgment or coordination.    FOR 24 HOURS DO NOT:  Drive, operate machinery or run household appliances.  Drink beer or alcoholic beverages.  Make important decisions or sign legal documents.    To avoid nausea, slowly advance diet as tolerated, avoiding spicy or greasy foods for the first day.  Add more substantial food to your diet according to your provider's instructions.  Babies can be fed formula or breast milk as soon as they are hungry.  INCREASE FLUIDS AND FIBER TO AVOID CONSTIPATION.    MILD FLU-LIKE SYMPTOMS ARE NORMAL.  YOU MAY EXPERIENCE GENERALIZED MUSCLE ACHES, THROAT IRRITATION, HEADACHE AND/OR SOME NAUSEA.    Electrical Cardioversion, Care After  What can I expect after the procedure?  After the procedure, it is common to have:  Some redness on the skin where the shocks were given.  Follow these instructions at home:  Ask your health care provider how to check your pulse. Check it often.  Rest for 48 hours after the procedure or as told by your health care provider.  Avoid or limit your caffeine use as told by your health care provider.  Contact a health care provider if:  You feel like your heart is beating too quickly or your pulse is not regular.  You have a serious muscle cramp that does not go away.  Get help right away if:  You have discomfort in your chest.  You are dizzy or you feel faint.  You have trouble breathing or you are short of breath.  Your speech is slurred.  You have trouble moving an arm or leg on one side of your body.  Your fingers or toes turn cold or blue.  This information is not intended to replace advice given to you by your health care provider. Make sure you discuss any questions you have with your health care provider.

## 2025-03-20 NOTE — PROCEDURES
Electrical Cardioversion    Date/Time: 3/20/2025 9:38 AM    Performed by: Romie Garcia M.D.  Authorized by: Romie Garcia M.D.    Consent:     Consent obtained:  Verbal and written    Consent given by:  Patient    Risks discussed:  Cutaneous burn, death, induced arrhythmia and pain    Alternatives discussed:  No treatment, rate-control medication, anti-coagulation medication, alternative treatment, observation, delayed treatment and referral  Sedation:     Patient sedated: Yes      Sedation type:  Per anesthesia  Pre-procedure details:     Cardioversion basis:  Elective    Rhythm:  Atrial fibrillation    Electrode placement:  Anterior-posterior    Anticoagulation status:  Apixaban  Attempt one:     Cardioversion mode:  Synchronous    Waveform:  Biphasic    Shock (Joules):  200    Shock outcome:  Conversion to normal sinus rhythm  Post-procedure details:     Patient status:  Awake    Patient tolerance of procedure:  Tolerated well, no immediate complications

## 2025-04-09 ENCOUNTER — NON-PROVIDER VISIT (OUTPATIENT)
Dept: CARDIOLOGY | Facility: PHYSICIAN GROUP | Age: 80
End: 2025-04-09
Payer: MEDICARE

## 2025-04-09 ENCOUNTER — APPOINTMENT (OUTPATIENT)
Dept: CARDIOLOGY | Facility: PHYSICIAN GROUP | Age: 80
End: 2025-04-09
Payer: MEDICARE

## 2025-04-09 VITALS
WEIGHT: 210 LBS | OXYGEN SATURATION: 97 % | HEIGHT: 70 IN | SYSTOLIC BLOOD PRESSURE: 116 MMHG | BODY MASS INDEX: 30.06 KG/M2 | HEART RATE: 70 BPM | RESPIRATION RATE: 14 BRPM | DIASTOLIC BLOOD PRESSURE: 64 MMHG

## 2025-04-09 DIAGNOSIS — E78.2 MIXED HYPERLIPIDEMIA: ICD-10-CM

## 2025-04-09 DIAGNOSIS — Z79.01 CHRONIC ANTICOAGULATION: ICD-10-CM

## 2025-04-09 DIAGNOSIS — Z95.0 CARDIAC PACEMAKER IN SITU: ICD-10-CM

## 2025-04-09 DIAGNOSIS — I48.0 PAROXYSMAL ATRIAL FIBRILLATION (HCC): ICD-10-CM

## 2025-04-09 DIAGNOSIS — I44.2 AV BLOCK, COMPLETE (HCC): ICD-10-CM

## 2025-04-09 DIAGNOSIS — I06.0 RHEUMATIC AORTIC STENOSIS: ICD-10-CM

## 2025-04-09 DIAGNOSIS — I10 HYPERTENSION, UNSPECIFIED TYPE: ICD-10-CM

## 2025-04-09 PROCEDURE — 3078F DIAST BP <80 MM HG: CPT | Performed by: NURSE PRACTITIONER

## 2025-04-09 PROCEDURE — 99214 OFFICE O/P EST MOD 30 MIN: CPT | Mod: 25 | Performed by: NURSE PRACTITIONER

## 2025-04-09 PROCEDURE — 93288 INTERROG EVL PM/LDLS PM IP: CPT | Performed by: NURSE PRACTITIONER

## 2025-04-09 PROCEDURE — 3074F SYST BP LT 130 MM HG: CPT | Performed by: NURSE PRACTITIONER

## 2025-04-09 RX ORDER — AMIODARONE HYDROCHLORIDE 200 MG/1
200 TABLET ORAL DAILY
Qty: 100 TABLET | Refills: 1 | Status: SHIPPED | OUTPATIENT
Start: 2025-04-09

## 2025-04-09 ASSESSMENT — ENCOUNTER SYMPTOMS
NERVOUS/ANXIOUS: 0
FEVER: 0
MYALGIAS: 0
ORTHOPNEA: 0
PND: 0
NAUSEA: 0
LOSS OF CONSCIOUSNESS: 0
CHILLS: 0
SHORTNESS OF BREATH: 1
BACK PAIN: 1
DIZZINESS: 0
BRUISES/BLEEDS EASILY: 0
ABDOMINAL PAIN: 0
COUGH: 0
PALPITATIONS: 0
HEADACHES: 0

## 2025-04-09 ASSESSMENT — FIBROSIS 4 INDEX: FIB4 SCORE: 2.47

## 2025-04-09 NOTE — PROGRESS NOTES
Chief Complaint   Patient presents with    Hospital Follow-up     DCCV on 3/20/2025 for AFib    Pacemaker Check/Dysfunction    AV Block Complete    Atrial Fibrillation    Anticoagulation    Aortic Stenosis    HTN (Controlled)    Hyperlipidemia       Subjective     Jacobo Bowling is a 79 y.o. male who presents today for hospital follow-up of DCCV for AFIib    Jacobo is a 79 year old male with history of high AV block with PM since July 2020, PAFib, anticoagulation with Eliquis, mild AS (with history of rheumatic fever as a teen), hypertension, hyperlipidemia (intolerant to statins) and FRANCHESCA (not treated), last seen by me in March 2025.     In October 2024, his remote monitor picked up persistent AFib; at follow-up with me in March 2025 (the first follow-up appt he could get), we started Amiodarone, and he had DCCV on 3/20/2025, which restored him to sinus rhythm.    He is here today for hospital follow-up. Generally, he is doing alright, but notices increased fatigue and shortness of breath.     In May 2023, he had minimally invasive back surgery by Dr. Berry, which went well, but he does still have sciatica; he did have a back injection yesterday, which has helped.    He did see his PCP yesterday, and expressed increasing shortness of breath; CXR was negative for acute cardiopulmonary process.     He denies any chest pain, pressure, tightness or discomfort; mild lightheadedness, but no dizziness or syncope; no orthopnea or PND; no LE edema.  He just notices lack of energy, especially with exertion.    Past Medical History:   Diagnosis Date    Arthritis     At risk for sleep apnea     AV block, complete (HCC) 07/2020    Status post pacemaker placement    Chronic anticoagulation     Heart murmur     1957 detected with murmur, then gone now say slight murmur present    History of chickenpox     History of measles     History of mumps     History of rheumatic fever     Hypertension     Pacemaker 07/2020    left  chest wall , [employer]    Paroxysmal atrial fibrillation (HCC)     Rheumatic aortic stenosis 2024    Echocardiogram with normal LV size, mild concentric LVH, LVEF 65%. Normal RA and RV, mildly dilated LA. Mild MR, mild AS (Vmax 2.2m/s, peak 20mmHg, mean 11mmHg, BRE 1.4cm2), mild TR. RVSP 40mmHg.    Rheumatic fever     Snoring      Past Surgical History:   Procedure Laterality Date    PB EXCIS TENDON SHEATH LESION, HAND/FINGER Left 2021    Procedure: EXCISION BIOPSY LEFT WRIST WITH CULTURES;  Surgeon: Emiliano Dawkins M.D.;  Location: [facility] [place];  Service: Orthopedics    [place] NEUROPLASTY & OR TRANSPOS MEDIAN NRV CARPAL ZULEMA Left 12/3/2020    Procedure: LEFT CARPAL TUNNEL RELEASE, LEFT GANGLION CYST EXCISION;  Surgeon: Emiliano Dawkins M.D.;  Location: [facility] [place];  Service: Orthopedics    PACEMAKER INSERTION Left 2020    [employer] Accolade MRI L311 implanted by Dr. Cantrell.    PB RECONSTR TOTAL SHOULDER IMPLANT Right 2020    Procedure: RT ARTHROPLASTY, SHOULDER, TOTAL;  Surgeon: Arsenio Hernandez M.D.;  Location: SUR[facility];  Service: Orthopedics    OTHER      left eye surgery     OTHER ORTHOPEDIC SURGERY      bilat RCR, shoulders, carpal tunnel surgery     Family History   Problem Relation Age of Onset    Cancer Mother         Breast,  at 62    Stroke Father         CVA age 67     Social History     Socioeconomic History    Marital status:      Spouse name: Not on file    Number of children: Not on file    Years of education: Not on file    Highest education level: Not on file   Occupational History    Not on file   Tobacco Use    Smoking status: Former     Current packs/day: 0.30     Average packs/day: 0.3 packs/day for 32.3 years (9.7 ttl pk-yrs)     Types: Cigarettes     Start date:     Smokeless tobacco: Never    Tobacco comments:     4 daily, pt states he does not inhale   Vaping Use    Vaping status: Never Used   Substance and Sexual  Activity    Alcohol use: Yes     Alcohol/week: 1.8 oz     Types: 1 Cans of beer, 2 Shots of liquor per week     Comment: 2-3 per week    Drug use: No    Sexual activity: Not on file   Other Topics Concern    Not on file   Social History Narrative    Not on file     Social Drivers of Health     Financial Resource Strain: Not on file   Food Insecurity: Not on file   Transportation Needs: Not on file   Physical Activity: Not on file   Stress: Not on file   Social Connections: Not on file   Intimate Partner Violence: Not on file   Housing Stability: Not on file     Allergies   Allergen Reactions    Atorvastatin Diarrhea     Sever diarrhea, does not want to try again    Typhoid Vaccines      Outpatient Encounter Medications as of 4/9/2025   Medication Sig Dispense Refill    amiodarone (CORDARONE) 200 MG Tab Take 1 Tablet by mouth every day. 100 Tablet 1    apixaban (ELIQUIS) 5mg Tab Take 1 Tablet by mouth 2 times a day. 200 Tablet 3    lisinopril (PRINIVIL) 2.5 MG Tab Take 1 Tablet by mouth every day. 100 Tablet 3    amoxicillin-clavulanate (AUGMENTIN) 875-125 MG Tab Take 1 Tablet by mouth 1 time a day as needed (prn dental). Pre-dental procedures      hydrocortisone rectal (PERIANAL) 2.5% Cream Apply small amount to anus daily 30 g 5    multivitamin (THERAGRAN) Tab Take 1 Tab by mouth every day.      [DISCONTINUED] amiodarone (CORDARONE) 200 MG Tab Take 1 Tablet by mouth every day. 30 Tablet 1     No facility-administered encounter medications on file as of 4/9/2025.     Review of Systems   Constitutional:  Positive for malaise/fatigue. Negative for chills and fever.   HENT:  Negative for congestion.    Respiratory:  Positive for shortness of breath. Negative for cough.    Cardiovascular:  Negative for chest pain, palpitations, orthopnea, leg swelling and PND.   Gastrointestinal:  Negative for abdominal pain and nausea.   Musculoskeletal:  Positive for back pain. Negative for myalgias.        Status post back surgery in  "May 2023 (Dr. Berry)   Skin:  Negative for rash.   Neurological:  Negative for dizziness, loss of consciousness and headaches.   Endo/Heme/Allergies:  Does not bruise/bleed easily.   Psychiatric/Behavioral:  The patient is not nervous/anxious.               Objective     /64 (BP Location: Left arm, Patient Position: Sitting, BP Cuff Size: Adult)   Pulse 70   Resp 14   Ht 1.778 m (5' 10\")   Wt 95.3 kg (210 lb)   SpO2 97%   BMI 30.13 kg/m²     Physical Exam  Constitutional:       Appearance: He is well-developed.   HENT:      Head: Normocephalic.   Neck:      Vascular: No JVD.   Cardiovascular:      Rate and Rhythm: Normal rate and regular rhythm.      Heart sounds: Murmur heard.      Systolic murmur is present with a grade of 2/6.      Comments: PM in left chest wall.  Murmur at RUSB.  Pulmonary:      Effort: Pulmonary effort is normal. No respiratory distress.      Breath sounds: Normal breath sounds. No wheezing or rales.   Abdominal:      General: Bowel sounds are normal. There is no distension.      Palpations: Abdomen is soft.      Tenderness: There is no abdominal tenderness.   Musculoskeletal:         General: Normal range of motion.      Cervical back: Normal range of motion and neck supple.   Skin:     General: Skin is warm and dry.      Findings: No rash.      Comments: Skin changes of chronic venous stasis.   Neurological:      Mental Status: He is alert and oriented to person, place, and time.     DEVICE INTERROGATION:    PM interrogation today reveals normal function. Presenting rhythm is atrial fibrillation, rate controlled, persistent since 3/22/2025 (95% of the time). No changes are made today. Battery is good for 2 years.    ECHOCARDIOGRAPHY:     CONCLUSIONS OF TTE OF 1/18/2024:  Compared to the images of the prior study on 03/31/2022 - there is no   significant change.  Normal left ventricular size and systolic function.  The left ventricular ejection fraction is visually estimated to be " 65%.  Grade II diastolic dysfunction.  Normal right ventricular size and systolic function.  Mild mitral regurgitation.  Mild aortic valve stenosis.  Mild tricuspid regurgitation.  Estimated right ventricular systolic pressure is 40 mmHg.     CONCLUSIONS OF ECHOCARDIOGRAM OF 3/31/2022:  The left ventricular ejection fraction is visually estimated to be 65-70%.  Pacer/ICD wire seen in the right ventricle.  Calcified aortic valve leaflets with mild aortic stenosis: Vmax is 2.7 m/s, MG 15 mmHg, BRE 2 cm2.  Mild tricuspid regurgitation.  Right ventricular systolic pressure is estimated to be 39 mmHg.  Compared to the prior echo on 12/02/2020, no significant change, aortic stenosis appears mild.     CONCLUSIONS OF ECHOCARDIOGRAM OF 12/2/2020:  Left ventricular ejection fraction is visually estimated to be 55-60%.  Grade II diastolic dysfunction.  Pacer/ICD wire seen in right ventricle.  Moderately dilated left atrium.  Calcified aortic valve leaflets with mild to moderate aortic   stenosis:Vmax is 2.2 m/s, BRE 1.2 cm2, MG 12 mmHg, DI 0.5.  Unable to estimate pulmonary artery pressure, normal estimated right atrial pressure.  Compared to prior Echo - 12/18/19, no significant change.    CT SCANS:     IMPRESSION OF CT SCAN OF CHEST OF 6/7/2021:  1.  Coronary atherosclerosis.  2.  No pathologic adenopathy is visualized.  3.  Small hiatal hernia.    LABS:    Component      Latest Ref Rng 3/7/2025   TSH      0.36 - 3.74 uIU/mL 3.61       Lab Results   Component Value Date/Time    WBC 7.6 03/07/2025 07:38 AM    RBC 3.55 (L) 03/07/2025 07:38 AM    HEMOGLOBIN 13.6 (L) 03/07/2025 07:38 AM    HEMATOCRIT 39.4 (L) 03/07/2025 07:38 AM    .0 (H) 03/07/2025 07:38 AM    MCH 38.3 (H) 03/07/2025 07:38 AM    MCHC 34.5 03/07/2025 07:38 AM    MPV 12.4 (H) 03/07/2025 07:38 AM       Lab Results   Component Value Date/Time    SODIUM 141 03/07/2025 07:38 AM    POTASSIUM 5.0 03/07/2025 07:38 AM    CHLORIDE 105 03/07/2025 07:38 AM    CO2 28  03/07/2025 07:38 AM    GLUCOSE 96 03/07/2025 07:38 AM    BUN 27 (H) 03/07/2025 07:38 AM    CREATININE 1.4 (H) 03/07/2025 07:38 AM    BUNCREATRAT 13 01/24/2020 06:50 AM    GLOMRATE 59 (L) 05/19/2023 09:53 AM      Lab Results   Component Value Date/Time    CHOLSTRLTOT 177 03/07/2025 07:38 AM     (H) 03/07/2025 07:38 AM    HDL 53.0 03/07/2025 07:38 AM    TRIGLYCERIDE 94 03/07/2025 07:38 AM        Lab Results   Component Value Date/Time    ASTSGOT 21 03/07/2025 07:38 AM    ALTSGPT 28 03/07/2025 07:38 AM         Assessment & Plan     1. Cardiac pacemaker in situ  REFERRAL TO CARDIOLOGY      2. AV block, complete (HCC)  REFERRAL TO CARDIOLOGY      3. Paroxysmal atrial fibrillation (HCC)  amiodarone (CORDARONE) 200 MG Tab    REFERRAL TO CARDIOLOGY      4. Chronic anticoagulation  REFERRAL TO CARDIOLOGY      5. Rheumatic aortic stenosis  EC-ECHOCARDIOGRAM COMPLETE W/O CONT      6. Hypertension, unspecified type        7. Mixed hyperlipidemia            Medical Decision Making: Today's Assessment/Status/Plan:          High AV block, with PPM, with persistent AFib since 3/22/2025 (two days post DCCV). He is definitely having fatigue and shortness of breath, and we discussed option of referral to EP to discuss possible ablation; he would like to proceed with this. Continue Amiodarone and Eliquis for now. Recent TSH and LFTs were normal.  Chronic anticoagulation with Eliquis. No bleeding problems.   Mild aortic stenosis, stable on echo in January 2024; will repeat echo, to make sure no progression of AS, which might be contributing to this symptoms.  Hypertension, treated with Lisinopril 2.5mg once daily. BP is good today.  Hyperlipidemia/coronary atherosclerosis seen on CT scan. He is intolerant to statins, and does not want to try other options; he understands his risk.  Thrombocytopenia, to repeat CBC.     He is back in AFib, and is asymptomatic.  To proceed with referral to EP to discuss possible ablation, or other  AAR.  Repeat echocardiogram.  We reviewed labs.   Same medications for now.    Follow-up TBD after FU with EP.

## 2025-04-16 ENCOUNTER — TELEPHONE (OUTPATIENT)
Dept: HEALTH INFORMATION MANAGEMENT | Facility: OTHER | Age: 80
End: 2025-04-16
Payer: MEDICARE

## 2025-04-17 ENCOUNTER — RESULTS FOLLOW-UP (OUTPATIENT)
Dept: CARDIOLOGY | Facility: PHYSICIAN GROUP | Age: 80
End: 2025-04-17
Payer: MEDICARE

## 2025-04-19 ENCOUNTER — NON-PROVIDER VISIT (OUTPATIENT)
Dept: CARDIOLOGY | Facility: MEDICAL CENTER | Age: 80
End: 2025-04-19
Payer: MEDICARE

## 2025-04-21 PROCEDURE — 93294 REM INTERROG EVL PM/LDLS PM: CPT | Performed by: STUDENT IN AN ORGANIZED HEALTH CARE EDUCATION/TRAINING PROGRAM

## 2025-04-29 ENCOUNTER — OFFICE VISIT (OUTPATIENT)
Dept: CARDIOLOGY | Facility: MEDICAL CENTER | Age: 80
End: 2025-04-29
Attending: NURSE PRACTITIONER
Payer: MEDICARE

## 2025-04-29 VITALS
BODY MASS INDEX: 31.1 KG/M2 | HEART RATE: 74 BPM | OXYGEN SATURATION: 98 % | HEIGHT: 69 IN | DIASTOLIC BLOOD PRESSURE: 72 MMHG | WEIGHT: 210 LBS | SYSTOLIC BLOOD PRESSURE: 140 MMHG

## 2025-04-29 DIAGNOSIS — Z95.0 CARDIAC PACEMAKER IN SITU: ICD-10-CM

## 2025-04-29 DIAGNOSIS — I44.2 AV BLOCK, COMPLETE (HCC): ICD-10-CM

## 2025-04-29 DIAGNOSIS — I48.19 PERSISTENT ATRIAL FIBRILLATION (HCC): ICD-10-CM

## 2025-04-29 LAB — EKG IMPRESSION: NORMAL

## 2025-04-29 PROCEDURE — 93279 PRGRMG DEV EVAL PM/LDLS PM: CPT | Performed by: INTERNAL MEDICINE

## 2025-04-29 PROCEDURE — 93005 ELECTROCARDIOGRAM TRACING: CPT | Mod: TC | Performed by: INTERNAL MEDICINE

## 2025-04-29 PROCEDURE — 99212 OFFICE O/P EST SF 10 MIN: CPT | Performed by: INTERNAL MEDICINE

## 2025-04-29 ASSESSMENT — FIBROSIS 4 INDEX: FIB4 SCORE: 2.47

## 2025-04-29 NOTE — PROGRESS NOTES
Arrhythmia Clinic Note (New Patient)    DOS: 4/29/2025    Referring physician: Anuja TURNER    Chief complaint/Reason for consult: AF    HPI:  Pt is an 78 yo M. He has history of paroxysmal turned persistent AF. This was discovered on PPM interrogation incidentally. His AV block that resulted in the PPM was also discovered incidentally during prep for shoulder surgery. [employer] dual chamber device placed he says at [facility] back in Jul 2020. He has been in AF/AFL since Sept of last year. A few months ago underwent cardioversion and with amiodarone. Despite cardioversion/amio he quickly reverted back into AF/AFL. He does complain of some fatigue with activity but tells me this is no different from this time last year when he was not in persistent fib. He is almost entirely paced. Referred for consideration of AF ablation or alternative AADs.    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 Grade II diastolic dysfunction.  Normal RA and RV; moderately dilated LA. Mild MR, mild AS, mild TR. RVSP 39mmHg.    Rheumatic fever 1957    Snoring        Past Surgical History:   Procedure Laterality Date    PB EXCIS TENDON SHEATH LESION, HAND/FINGER Left 7/8/2021    Procedure: EXCISION BIOPSY LEFT WRIST WITH CULTURES;  Surgeon: Emiliano Dawkins M.D.;  Location: [facility] [place];  Service: Orthopedics    [place] NEUROPLASTY & OR TRANSPOS MEDIAN NRV CARPAL ZULEMA Left 12/3/2020    Procedure: LEFT CARPAL TUNNEL RELEASE, LEFT GANGLION CYST EXCISION;  Surgeon: Emiliano Dawkins M.D.;  Location: [facility] [place];  Service: Orthopedics    PACEMAKER INSERTION Left 07/17/2020    [employer] Accolade MRI L311 implanted by Dr. Cantrell.    PB RECONSTR TOTAL SHOULDER IMPLANT Right 6/29/2020    Procedure: RT ARTHROPLASTY, SHOULDER, TOTAL;  Surgeon: Arsenio Hernandez M.D.;  Location: [facility];  Service: Orthopedics    OTHER      left eye surgery     OTHER ORTHOPEDIC SURGERY      bilat RCR, shoulders, carpal tunnel surgery       Social History     Socioeconomic History    Marital status:      Spouse name: Not on file    Number of children: Not on file    Years of education: Not on file    Highest education level: Not on file   Occupational History    Not on file   Tobacco Use    Smoking status: Former     Current packs/day: 0.30     Average packs/day: 0.3 packs/day for 32.3 years (9.7 ttl pk-yrs)     Types: Cigarettes     Start date: 1993    Smokeless tobacco: Never    Tobacco comments:     4 daily, pt states he does not inhale   Vaping Use    Vaping status: Never Used   Substance and Sexual Activity    Alcohol use: Yes     Alcohol/week: 1.8 oz     Types: 1 Cans of beer, 2 Shots of liquor per week     Comment: 2-3 per week    Drug use: No    Sexual activity: Not on file   Other Topics Concern    Not on file   Social History Narrative    Not on file     Social Drivers of Health     Financial Resource Strain: Not on file   Food Insecurity: Not  "on file   Transportation Needs: Not on file   Physical Activity: Not on file   Stress: Not on file   Social Connections: Not on file   Intimate Partner Violence: Not on file   Housing Stability: Not on file       Family History   Problem Relation Age of Onset    Cancer Mother         Breast,  at 62    Stroke Father         CVA age 67       Allergies   Allergen Reactions    Atorvastatin Diarrhea     Sever diarrhea, does not want to try again    Typhoid Vaccines        Current Outpatient Medications   Medication Sig Dispense Refill    amiodarone (CORDARONE) 200 MG Tab Take 1 Tablet by mouth every day. 100 Tablet 1    apixaban (ELIQUIS) 5mg Tab Take 1 Tablet by mouth 2 times a day. 200 Tablet 3    lisinopril (PRINIVIL) 2.5 MG Tab Take 1 Tablet by mouth every day. 100 Tablet 3    amoxicillin-clavulanate (AUGMENTIN) 875-125 MG Tab Take 1 Tablet by mouth 1 time a day as needed (prn dental). Pre-dental procedures      hydrocortisone rectal (PERIANAL) 2.5% Cream Apply small amount to anus daily 30 g 5    multivitamin (THERAGRAN) Tab Take 1 Tab by mouth every day.       No current facility-administered medications for this visit.       Physical Exam:  Vitals:    25 0732   BP: (!) 140/72   BP Location: Right arm   Patient Position: Sitting   BP Cuff Size: Adult   Pulse: 74   SpO2: 98%   Weight: 95.3 kg (210 lb)   Height: 1.753 m (5' 9\")     General appearance: NAD, conversant  Eyes: anicteric sclerae, no lid-lag; PERRLA  HENT: Atraumatic; moist mucous membranes, no ulcerations  Neck: Trachea midline; FROM, supple, no thyromegaly  Lungs: CTA, with normal respiratory effort and no intercostal retractions  CV: RRR, no MRGs, no JVD  Abdomen: Soft, non-tender; normal bowel sounds, no HSM  Extremities: No peripheral edema. No clubbing or cyanosis.  Skin: Normal temperature, turgor and texture; no rash or ulcers  Psych: Appropriate affect, alert and oriented to person, place and time    Data:  Labs reviewed    Prior " echo/stress reviewed:  Preserved LV systolic function, no mitral stenosis, mild AS    EKG interpreted by me:  AF/AFL v paced    Impression/Plan:  1. Persistent atrial fibrillation (HCC)  EKG      2. Cardiac pacemaker in situ        3. AV block, complete (HCC)          -Talking to him, I do not really think he is symptomatic from the AF, not surprising since he is essentially always V paced  -I discussed with him if not symptomatic he may not feel any improvement in the case of successful rhythm control  -I did also discuss other potential benefits of sinus restoration such as reducing chance of CHF if he stays in permanent AF  -Either case, the amiodarone not effective, stop amio  -He will let us know if he decides to proceed, but I think also if feels okay, staying in AF on OAC also reasonable given lack of symptoms and more advanced age    Shyanne Grey MD

## 2025-05-09 NOTE — CARDIAC REMOTE MONITOR - SCAN
Device transmission reviewed. Device demonstrated appropriate function.       Electronically Signed by: Mary Rogers MD, PhD    5/23/2025  6:51 PM

## 2025-06-04 ENCOUNTER — TELEPHONE (OUTPATIENT)
Dept: CARDIOLOGY | Facility: MEDICAL CENTER | Age: 80
End: 2025-06-04

## 2025-06-04 ENCOUNTER — OFFICE VISIT (OUTPATIENT)
Dept: CARDIOLOGY | Facility: PHYSICIAN GROUP | Age: 80
End: 2025-06-04
Payer: MEDICARE

## 2025-06-04 VITALS
WEIGHT: 210 LBS | DIASTOLIC BLOOD PRESSURE: 68 MMHG | HEART RATE: 70 BPM | OXYGEN SATURATION: 96 % | BODY MASS INDEX: 31.1 KG/M2 | RESPIRATION RATE: 14 BRPM | SYSTOLIC BLOOD PRESSURE: 128 MMHG | HEIGHT: 69 IN

## 2025-06-04 DIAGNOSIS — I10 HYPERTENSION, UNSPECIFIED TYPE: ICD-10-CM

## 2025-06-04 DIAGNOSIS — E78.2 MIXED HYPERLIPIDEMIA: ICD-10-CM

## 2025-06-04 DIAGNOSIS — I48.0 PAROXYSMAL ATRIAL FIBRILLATION (HCC): ICD-10-CM

## 2025-06-04 DIAGNOSIS — I48.19 PERSISTENT ATRIAL FIBRILLATION (HCC): ICD-10-CM

## 2025-06-04 DIAGNOSIS — Z79.01 CHRONIC ANTICOAGULATION: ICD-10-CM

## 2025-06-04 DIAGNOSIS — I44.2 AV BLOCK, COMPLETE (HCC): ICD-10-CM

## 2025-06-04 DIAGNOSIS — I06.0 RHEUMATIC AORTIC STENOSIS: ICD-10-CM

## 2025-06-04 DIAGNOSIS — I48.0 PAROXYSMAL ATRIAL FIBRILLATION (HCC): Primary | ICD-10-CM

## 2025-06-04 DIAGNOSIS — Z95.0 CARDIAC PACEMAKER IN SITU: Primary | ICD-10-CM

## 2025-06-04 PROCEDURE — 99214 OFFICE O/P EST MOD 30 MIN: CPT | Performed by: NURSE PRACTITIONER

## 2025-06-04 PROCEDURE — 3078F DIAST BP <80 MM HG: CPT | Performed by: NURSE PRACTITIONER

## 2025-06-04 PROCEDURE — 3074F SYST BP LT 130 MM HG: CPT | Performed by: NURSE PRACTITIONER

## 2025-06-04 ASSESSMENT — ENCOUNTER SYMPTOMS
NAUSEA: 0
MYALGIAS: 0
COUGH: 0
BRUISES/BLEEDS EASILY: 0
ABDOMINAL PAIN: 0
ORTHOPNEA: 0
HEADACHES: 0
BACK PAIN: 1
CHILLS: 0
NERVOUS/ANXIOUS: 0
LOSS OF CONSCIOUSNESS: 0
SHORTNESS OF BREATH: 1
DIZZINESS: 0
FEVER: 0
PALPITATIONS: 0
PND: 0

## 2025-06-04 ASSESSMENT — FIBROSIS 4 INDEX: FIB4 SCORE: 2.47

## 2025-06-04 NOTE — TELEPHONE ENCOUNTER
Dr. Grey,    This patient would like to proceed with scheduling the ablation discussed in your last office visit. Are there any medications you would like this patient to hold for this procedure? Do you have a preference on mapping? Can this case be schedule at [facility]?    Amber - can you please enter a [facility] order for this procedure?    Thank You,  Gaviota

## 2025-06-04 NOTE — TELEPHONE ENCOUNTER
----- Message from Nurse Practitioner DANNY River sent at 6/4/2025 12:44 PM PDT -----  Regarding: AFib ablation  Gaviota,This patient saw Dr. Grey on 4/29/2025 to discuss ablation for Afib, and decision was left up to the patient.I saw him today in [place], and he would like to proceed with it.I put order in Epic.Can you please call him to schedule?Home phone is [phone] know to expect your call.Thank you!AB

## 2025-06-04 NOTE — PROGRESS NOTES
Chief Complaint   Patient presents with    Follow-Up    Atrial Fibrillation       Subjective     Jacobo Bowling is a 79 y.o. male who presents today for follow-up to discuss possible ablation for AFib.    Jacobo is a 79 year old male with history of high AV block with PM since 2020, PAFib, anticoagulation with Eliquis, mild AS (with history of rheumatic fever as a teen), hypertension, hyperlipidemia (intolerant to statins) and FRANCHESCA (not treated), last seen by me in 2025.     In 2024, his remote monitor picked up persistent AFib.  In 2025 (the first follow-up appt he could get), we started Amiodarone, and he had DCCV on 3/20/2025, which restored him to sinus rhythm, but at follow-up in 2025, he was back in AFib (just a couple of days after DCCV).  On 2025, he saw Dr. Grey to discuss other AAR options and ablation; it was left up to the patient to decide. Amiodarone was stopped.    He is here today to discuss pros and cons of proceeding with ablation. He does note ongoing fatigue and shortness of breath with activities/exertion.     He denies any chest pain, pressure, tightness or discomfort; mild lightheadedness, but no dizziness or syncope; no orthopnea or PND; no LE edema.  He is pretty sure he would like to proceed with ablation.       Past Medical History[1]  Past Surgical History[2]  Family History   Problem Relation Age of Onset    Cancer Mother         Breast,  at 62    Stroke Father         CVA age 67     Social History     Socioeconomic History    Marital status:      Spouse name: Not on file    Number of children: Not on file    Years of education: Not on file    Highest education level: Not on file   Occupational History    Not on file   Tobacco Use    Smoking status: Former     Current packs/day: 0.30     Average packs/day: 0.3 packs/day for 32.4 years (9.7 ttl pk-yrs)     Types: Cigarettes     Start date:     Smokeless tobacco: Never    Tobacco  "comments:     4 daily, pt states he does not inhale   Vaping Use    Vaping status: Never Used   Substance and Sexual Activity    Alcohol use: Yes     Alcohol/week: 1.8 oz     Types: 1 Cans of beer, 2 Shots of liquor per week     Comment: 2-3 per week    Drug use: No    Sexual activity: Not on file   Other Topics Concern    Not on file   Social History Narrative    Not on file     Social Drivers of Health     Financial Resource Strain: Not on file   Food Insecurity: Not on file   Transportation Needs: Not on file   Physical Activity: Not on file   Stress: Not on file   Social Connections: Not on file   Intimate Partner Violence: Not on file   Housing Stability: Not on file     Allergies[3]  Encounter Medications[4]  Review of Systems   Constitutional:  Positive for malaise/fatigue. Negative for chills and fever.   HENT:  Negative for congestion.    Respiratory:  Positive for shortness of breath. Negative for cough.    Cardiovascular:  Negative for chest pain, palpitations, orthopnea, leg swelling and PND.   Gastrointestinal:  Negative for abdominal pain and nausea.   Musculoskeletal:  Positive for back pain. Negative for myalgias.        Status post back surgery in May 2023 (Dr. Berry)   Skin:  Negative for rash.   Neurological:  Negative for dizziness, loss of consciousness and headaches.   Endo/Heme/Allergies:  Does not bruise/bleed easily.   Psychiatric/Behavioral:  The patient is not nervous/anxious.               Objective     /68 (BP Location: Left arm, Patient Position: Sitting, BP Cuff Size: Adult)   Pulse 70   Resp 14   Ht 1.753 m (5' 9\")   Wt 95.3 kg (210 lb)   SpO2 96%   BMI 31.01 kg/m²     Physical Exam  Constitutional:       Appearance: He is well-developed.   HENT:      Head: Normocephalic.   Neck:      Vascular: No JVD.   Cardiovascular:      Rate and Rhythm: Normal rate and regular rhythm.      Heart sounds: Murmur heard.      Systolic murmur is present with a grade of 2/6.      Comments: " PM in left chest wall.  Murmur at RUSB.  Pulmonary:      Effort: Pulmonary effort is normal. No respiratory distress.      Breath sounds: Normal breath sounds. No wheezing or rales.   Abdominal:      General: Bowel sounds are normal. There is no distension.      Palpations: Abdomen is soft.      Tenderness: There is no abdominal tenderness.   Musculoskeletal:         General: Normal range of motion.      Cervical back: Normal range of motion and neck supple.   Skin:     General: Skin is warm and dry.      Findings: No rash.      Comments: Skin changes of chronic venous stasis.   Neurological:      Mental Status: He is alert and oriented to person, place, and time.       DEVICE INTERROGATION:     PM interrogation done in April 2025 showed persistent AFib.   PM interrogation not done today.     ECHOCARDIOGRAPHY:     CONCLUSIONS OF TTE OF 4/15/2025:  Compared to the prior study on 01/18/2024 - no significant changes.  Normal left ventricular size and systolic function.  The left ventricular ejection fraction is visually estimated to be 65%.  Grade II diastolic dysfunction.  Mild aortic valve stenosis, gradients likely underestimated this study   due to imaging limitations based on comparison to prior evaluation.  Mild mitral regurgitation.  Dilated inferior vena cava with inspiratory collapse.    CONCLUSIONS OF TTE OF 1/18/2024:  Compared to the images of the prior study on 03/31/2022 - there is no   significant change.  Normal left ventricular size and systolic function.  The left ventricular ejection fraction is visually estimated to be 65%.  Grade II diastolic dysfunction.  Normal right ventricular size and systolic function.  Mild mitral regurgitation.  Mild aortic valve stenosis.  Mild tricuspid regurgitation.  Estimated right ventricular systolic pressure is 40 mmHg.     CONCLUSIONS OF ECHOCARDIOGRAM OF 3/31/2022:  The left ventricular ejection fraction is visually estimated to be 65-70%.  Pacer/ICD wire seen in  the right ventricle.  Calcified aortic valve leaflets with mild aortic stenosis: Vmax is 2.7 m/s, MG 15 mmHg, BRE 2 cm2.  Mild tricuspid regurgitation.  Right ventricular systolic pressure is estimated to be 39 mmHg.  Compared to the prior echo on 12/02/2020, no significant change, aortic stenosis appears mild.     CONCLUSIONS OF ECHOCARDIOGRAM OF 12/2/2020:  Left ventricular ejection fraction is visually estimated to be 55-60%.  Grade II diastolic dysfunction.  Pacer/ICD wire seen in right ventricle.  Moderately dilated left atrium.  Calcified aortic valve leaflets with mild to moderate aortic   stenosis:Vmax is 2.2 m/s, BRE 1.2 cm2, MG 12 mmHg, DI 0.5.  Unable to estimate pulmonary artery pressure, normal estimated right atrial pressure.  Compared to prior Echo - 12/18/19, no significant change.     CT SCANS:     IMPRESSION OF CT SCAN OF CHEST OF 6/7/2021:  1.  Coronary atherosclerosis.  2.  No pathologic adenopathy is visualized.  3.  Small hiatal hernia.     LABS:        Lab Results   Component Value Date/Time    CHOLSTRLTOT 177 03/07/2025 07:38 AM     (H) 03/07/2025 07:38 AM    HDL 53.0 03/07/2025 07:38 AM    TRIGLYCERIDE 94 03/07/2025 07:38 AM        Lab Results   Component Value Date/Time    ASTSGOT 21 03/07/2025 07:38 AM    ALTSGPT 28 03/07/2025 07:38 AM       Lab Results   Component Value Date/Time    SODIUM 141 03/07/2025 07:38 AM    POTASSIUM 5.0 03/07/2025 07:38 AM    CHLORIDE 105 03/07/2025 07:38 AM    CO2 28 03/07/2025 07:38 AM    GLUCOSE 96 03/07/2025 07:38 AM    BUN 27 (H) 03/07/2025 07:38 AM    CREATININE 1.4 (H) 03/07/2025 07:38 AM    BUNCREATRAT 13 01/24/2020 06:50 AM    GLOMRATE 59 (L) 05/19/2023 09:53 AM          Assessment & Plan     1. Cardiac pacemaker in situ        2. AV block, complete (HCC)        3. Paroxysmal atrial fibrillation (HCC)        4. Chronic anticoagulation        5. Rheumatic aortic stenosis        6. Hypertension, unspecified type        7. Mixed hyperlipidemia             Medical Decision Making: Today's Assessment/Status/Plan:        High AV block, with PPM, with more persistent AFib over the last 9 months. He did see Dr. Grey on 4/29/2025 to discuss possible ablation, and he would like to proceed. Will let schedulers know.  Chronic anticoagulation with Eliquis, ongoing.  Mild AS, stable on last echo in April 2025.  Hypertension, treated with low dose Lisinopril 2.5mg once daily. BP is stable.  Hyperlipidemia, not currently on any therapy.    As above, he would like to proceed with ablation procedure.  Will notify schedulers.  Same medications for now (no Amiodarone).    Follow-up TBD after procedure.                     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 Grade II diastolic dysfunction. Normal RA and RV; moderately dilated LA. Mild MR, mild AS, mild TR. RVSP 39mmHg.    Rheumatic fever 1957    Snoring    [2]   Past Surgical History:  Procedure Laterality Date    PB EXCIS TENDON SHEATH LESION, HAND/FINGER Left 7/8/2021    Procedure: EXCISION BIOPSY LEFT WRIST WITH CULTURES;  Surgeon: Emiliano Dawkins M.D.;  Location: SUR[facility] [place];  Service: Orthopedics    [place] NEUROPLASTY & OR TRANSPOS MEDIAN NRV CARPAL ZULEMA Left 12/3/2020    Procedure: LEFT CARPAL TUNNEL RELEASE, LEFT GANGLION CYST EXCISION;  Surgeon: Emiliano Dawkins M.D.;  Location: SUR[facility]ERY [place];  Service: Orthopedics    PACEMAKER INSERTION Left 07/17/2020    [employer] Accolade MRI L311 implanted by Dr. Cantrell.    PB RECONSTR TOTAL SHOULDER IMPLANT Right 6/29/2020    Procedure: RT ARTHROPLASTY, SHOULDER, TOTAL;  Surgeon: Arsenio Hernandez M.D.;  Location: [facility];  Service: Orthopedics    OTHER      left eye surgery     OTHER ORTHOPEDIC SURGERY      bilat RCR, shoulders, carpal tunnel surgery   [3]   Allergies  Allergen Reactions    Atorvastatin Diarrhea     Sever diarrhea, does not want to try again    Typhoid Vaccines    [4]   Outpatient Encounter Medications as of 6/4/2025   Medication Sig Dispense Refill    apixaban (ELIQUIS) 5mg Tab Take 1 Tablet by mouth 2 times a day. 200 Tablet 3    lisinopril (PRINIVIL) 2.5 MG Tab Take 1 Tablet by mouth every day. 100 Tablet 3    amoxicillin-clavulanate (AUGMENTIN) 875-125 MG Tab Take 1 Tablet by mouth 1 time a day as needed (prn dental). Pre-dental procedures      hydrocortisone rectal (PERIANAL) 2.5% Cream Apply small amount to anus daily 30 g 5    multivitamin (THERAGRAN) Tab Take 1 Tab by mouth every day.      [DISCONTINUED] amiodarone (CORDARONE) 200 MG Tab Take 1 Tablet by mouth every day. (Patient not taking: Reported on 6/4/2025) 100 Tablet 1     No facility-administered encounter medications on file as of 6/4/2025.

## 2025-06-12 NOTE — TELEPHONE ENCOUNTER
LES Adkins, Med Ass't  Caller: Unspecified (1 week ago)  Yes I think let's do CARTO with PFA. Probably do at [facility]. Nothing to hold.

## 2025-06-12 NOTE — TELEPHONE ENCOUNTER
Patient scheduled for afib ablation w/[facility] on 7-8-25 with Dr. Grey. Patient has been instructed to check in at 5:30 for 7:30 case time. Message sent to authorizations. Jenny scheduled, emailed Dejuan TINOCO with [facility] notified.

## 2025-06-24 ENCOUNTER — APPOINTMENT (OUTPATIENT)
Dept: ADMISSIONS | Facility: MEDICAL CENTER | Age: 80
End: 2025-06-24
Attending: INTERNAL MEDICINE
Payer: MEDICARE

## 2025-06-30 ENCOUNTER — PRE-ADMISSION TESTING (OUTPATIENT)
Dept: ADMISSIONS | Facility: MEDICAL CENTER | Age: 80
End: 2025-06-30
Attending: INTERNAL MEDICINE
Payer: MEDICARE

## 2025-06-30 NOTE — PREADMIT AVS NOTE
Current Medications   Medication Instructions    diphenhydrAMINE-APAP, sleep, (TYLENOL PM EXTRA STRENGTH PO) As needed medication, may take if needed, including morning of procedure     apixaban (ELIQUIS) 5mg Tab Follow instructions from surgeon or specialist.    lisinopril (PRINIVIL) 2.5 MG Tab Stop 24 hours before surgery    hydrocortisone rectal (PERIANAL) 2.5% Cream Hold medication day of procedure    multivitamin (THERAGRAN) Tab Stop 7 days before surgery

## 2025-06-30 NOTE — OR NURSING
Preadmit appointment completed with patient for procedure on 7-8-25.  Chart/medications reviewed and updated.  Instructions given per protocol.

## 2025-07-08 ENCOUNTER — ANESTHESIA EVENT (OUTPATIENT)
Dept: CARDIOLOGY | Facility: MEDICAL CENTER | Age: 80
End: 2025-07-08
Payer: MEDICARE

## 2025-07-08 ENCOUNTER — HOSPITAL ENCOUNTER (OUTPATIENT)
Facility: MEDICAL CENTER | Age: 80
End: 2025-07-08
Attending: INTERNAL MEDICINE | Admitting: INTERNAL MEDICINE
Payer: MEDICARE

## 2025-07-08 ENCOUNTER — APPOINTMENT (OUTPATIENT)
Dept: CARDIOLOGY | Facility: MEDICAL CENTER | Age: 80
End: 2025-07-08
Attending: NURSE PRACTITIONER
Payer: MEDICARE

## 2025-07-08 ENCOUNTER — ANESTHESIA (OUTPATIENT)
Dept: CARDIOLOGY | Facility: MEDICAL CENTER | Age: 80
End: 2025-07-08
Payer: MEDICARE

## 2025-07-08 VITALS
HEART RATE: 62 BPM | RESPIRATION RATE: 18 BRPM | TEMPERATURE: 96.5 F | HEIGHT: 69 IN | SYSTOLIC BLOOD PRESSURE: 148 MMHG | WEIGHT: 215.17 LBS | BODY MASS INDEX: 31.87 KG/M2 | DIASTOLIC BLOOD PRESSURE: 82 MMHG | OXYGEN SATURATION: 93 %

## 2025-07-08 DIAGNOSIS — I44.2 AV BLOCK, COMPLETE (HCC): ICD-10-CM

## 2025-07-08 DIAGNOSIS — I48.19 PERSISTENT ATRIAL FIBRILLATION (HCC): ICD-10-CM

## 2025-07-08 DIAGNOSIS — I48.0 PAROXYSMAL ATRIAL FIBRILLATION (HCC): ICD-10-CM

## 2025-07-08 LAB
ACT BLD: 273 S (ref 74–137)
ACT BLD: 285 S (ref 74–137)
ACT BLD: 297 S (ref 74–137)
ALBUMIN SERPL BCP-MCNC: 4.5 G/DL (ref 3.2–4.9)
ALBUMIN/GLOB SERPL: 1.7 G/DL
ALP SERPL-CCNC: 47 U/L (ref 30–99)
ALT SERPL-CCNC: 18 U/L (ref 2–50)
ANION GAP SERPL CALC-SCNC: 11 MMOL/L (ref 7–16)
ANISOCYTOSIS BLD QL SMEAR: ABNORMAL
AST SERPL-CCNC: 20 U/L (ref 12–45)
BASOPHILS # BLD AUTO: 0.8 % (ref 0–1.8)
BASOPHILS # BLD: 0.06 K/UL (ref 0–0.12)
BILIRUB SERPL-MCNC: 0.7 MG/DL (ref 0.1–1.5)
BUN SERPL-MCNC: 24 MG/DL (ref 8–22)
CALCIUM ALBUM COR SERPL-MCNC: 8.9 MG/DL (ref 8.5–10.5)
CALCIUM SERPL-MCNC: 9.3 MG/DL (ref 8.5–10.5)
CHLORIDE SERPL-SCNC: 105 MMOL/L (ref 96–112)
CO2 SERPL-SCNC: 21 MMOL/L (ref 20–33)
CREAT SERPL-MCNC: 1.49 MG/DL (ref 0.5–1.4)
EKG IMPRESSION: NORMAL
EKG IMPRESSION: NORMAL
EOSINOPHIL # BLD AUTO: 0.13 K/UL (ref 0–0.51)
EOSINOPHIL NFR BLD: 1.7 % (ref 0–6.9)
ERYTHROCYTE [DISTWIDTH] IN BLOOD BY AUTOMATED COUNT: 60.6 FL (ref 35.9–50)
GFR SERPLBLD CREATININE-BSD FMLA CKD-EPI: 47 ML/MIN/1.73 M 2
GLOBULIN SER CALC-MCNC: 2.6 G/DL (ref 1.9–3.5)
GLUCOSE SERPL-MCNC: 95 MG/DL (ref 65–99)
HCT VFR BLD AUTO: 39.9 % (ref 42–52)
HGB BLD-MCNC: 13.4 G/DL (ref 14–18)
INR PPP: 1.27 (ref 0.87–1.13)
LYMPHOCYTES # BLD AUTO: 2.14 K/UL (ref 1–4.8)
LYMPHOCYTES NFR BLD: 28.2 % (ref 22–41)
MACROCYTES BLD QL SMEAR: ABNORMAL
MANUAL DIFF BLD: NORMAL
MCH RBC QN AUTO: 37.5 PG (ref 27–33)
MCHC RBC AUTO-ENTMCNC: 33.6 G/DL (ref 32.3–36.5)
MCV RBC AUTO: 111.8 FL (ref 81.4–97.8)
MONOCYTES # BLD AUTO: 0.8 K/UL (ref 0–0.85)
MONOCYTES NFR BLD AUTO: 10.3 % (ref 0–13.4)
MORPHOLOGY BLD-IMP: NORMAL
NEUTROPHILS # BLD AUTO: 4.48 K/UL (ref 1.82–7.42)
NEUTROPHILS NFR BLD: 59 % (ref 44–72)
NRBC # BLD AUTO: 0 K/UL
NRBC BLD-RTO: 0 /100 WBC (ref 0–0.2)
PLATELET # BLD AUTO: 126 K/UL (ref 164–446)
PLATELET BLD QL SMEAR: NORMAL
PMV BLD AUTO: 12.3 FL (ref 9–12.9)
POTASSIUM SERPL-SCNC: 4.4 MMOL/L (ref 3.6–5.5)
PROT SERPL-MCNC: 7.1 G/DL (ref 6–8.2)
PROTHROMBIN TIME: 16 SEC (ref 12–14.6)
RBC # BLD AUTO: 3.57 M/UL (ref 4.7–6.1)
RBC BLD AUTO: PRESENT
SODIUM SERPL-SCNC: 137 MMOL/L (ref 135–145)
WBC # BLD AUTO: 7.6 K/UL (ref 4.8–10.8)

## 2025-07-08 PROCEDURE — 700105 HCHG RX REV CODE 258: Performed by: ANESTHESIOLOGY

## 2025-07-08 PROCEDURE — 700101 HCHG RX REV CODE 250

## 2025-07-08 PROCEDURE — 700111 HCHG RX REV CODE 636 W/ 250 OVERRIDE (IP)

## 2025-07-08 PROCEDURE — 85610 PROTHROMBIN TIME: CPT

## 2025-07-08 PROCEDURE — 93005 ELECTROCARDIOGRAM TRACING: CPT | Mod: TC | Performed by: INTERNAL MEDICINE

## 2025-07-08 PROCEDURE — 700101 HCHG RX REV CODE 250: Performed by: ANESTHESIOLOGY

## 2025-07-08 PROCEDURE — 700111 HCHG RX REV CODE 636 W/ 250 OVERRIDE (IP): Mod: JZ | Performed by: ANESTHESIOLOGY

## 2025-07-08 PROCEDURE — 93657 TX L/R ATRIAL FIB ADDL: CPT | Performed by: INTERNAL MEDICINE

## 2025-07-08 PROCEDURE — 160015 HCHG STAT PREOP MINUTES

## 2025-07-08 PROCEDURE — 160195 HCHG PACU COMPLEX - 1ST 60 MINS

## 2025-07-08 PROCEDURE — 93656 COMPRE EP EVAL ABLTJ ATR FIB: CPT | Performed by: INTERNAL MEDICINE

## 2025-07-08 PROCEDURE — C1894 INTRO/SHEATH, NON-LASER: HCPCS

## 2025-07-08 PROCEDURE — 93010 ELECTROCARDIOGRAM REPORT: CPT | Performed by: INTERNAL MEDICINE

## 2025-07-08 PROCEDURE — 160046 HCHG PACU - 1ST 60 MINS PHASE II

## 2025-07-08 PROCEDURE — 160002 HCHG RECOVERY MINUTES (STAT)

## 2025-07-08 PROCEDURE — 93325 DOPPLER ECHO COLOR FLOW MAPG: CPT

## 2025-07-08 PROCEDURE — 700111 HCHG RX REV CODE 636 W/ 250 OVERRIDE (IP): Performed by: ANESTHESIOLOGY

## 2025-07-08 PROCEDURE — 85347 COAGULATION TIME ACTIVATED: CPT | Performed by: INTERNAL MEDICINE

## 2025-07-08 PROCEDURE — 93010 ELECTROCARDIOGRAM REPORT: CPT | Mod: 77 | Performed by: INTERNAL MEDICINE

## 2025-07-08 PROCEDURE — 85027 COMPLETE CBC AUTOMATED: CPT

## 2025-07-08 PROCEDURE — 80053 COMPREHEN METABOLIC PANEL: CPT

## 2025-07-08 PROCEDURE — 160047 HCHG PACU  - EA ADDL 30 MINS PHASE II

## 2025-07-08 PROCEDURE — 85007 BL SMEAR W/DIFF WBC COUNT: CPT

## 2025-07-08 PROCEDURE — 93655 ICAR CATH ABLTJ DSCRT ARRHYT: CPT | Performed by: INTERNAL MEDICINE

## 2025-07-08 RX ORDER — ONDANSETRON 2 MG/ML
4 INJECTION INTRAMUSCULAR; INTRAVENOUS EVERY 6 HOURS PRN
Status: DISCONTINUED | OUTPATIENT
Start: 2025-07-08 | End: 2025-07-08 | Stop reason: HOSPADM

## 2025-07-08 RX ORDER — OXYCODONE HCL 5 MG/5 ML
10 SOLUTION, ORAL ORAL
Status: DISCONTINUED | OUTPATIENT
Start: 2025-07-08 | End: 2025-07-08 | Stop reason: HOSPADM

## 2025-07-08 RX ORDER — LIDOCAINE HYDROCHLORIDE 20 MG/ML
INJECTION, SOLUTION INFILTRATION; PERINEURAL
Status: COMPLETED
Start: 2025-07-08 | End: 2025-07-08

## 2025-07-08 RX ORDER — MIDAZOLAM HYDROCHLORIDE 1 MG/ML
INJECTION INTRAMUSCULAR; INTRAVENOUS
Status: COMPLETED
Start: 2025-07-08 | End: 2025-07-08

## 2025-07-08 RX ORDER — ALBUTEROL SULFATE 5 MG/ML
2.5 SOLUTION RESPIRATORY (INHALATION)
Status: DISCONTINUED | OUTPATIENT
Start: 2025-07-08 | End: 2025-07-08 | Stop reason: HOSPADM

## 2025-07-08 RX ORDER — DEXAMETHASONE SODIUM PHOSPHATE 4 MG/ML
INJECTION, SOLUTION INTRA-ARTICULAR; INTRALESIONAL; INTRAMUSCULAR; INTRAVENOUS; SOFT TISSUE PRN
Status: DISCONTINUED | OUTPATIENT
Start: 2025-07-08 | End: 2025-07-08 | Stop reason: SURG

## 2025-07-08 RX ORDER — HALOPERIDOL 5 MG/ML
1 INJECTION INTRAMUSCULAR
Status: DISCONTINUED | OUTPATIENT
Start: 2025-07-08 | End: 2025-07-08 | Stop reason: HOSPADM

## 2025-07-08 RX ORDER — HYDRALAZINE HYDROCHLORIDE 20 MG/ML
5 INJECTION INTRAMUSCULAR; INTRAVENOUS
Status: DISCONTINUED | OUTPATIENT
Start: 2025-07-08 | End: 2025-07-08 | Stop reason: HOSPADM

## 2025-07-08 RX ORDER — HEPARIN SODIUM 200 [USP'U]/100ML
INJECTION, SOLUTION INTRAVENOUS
Status: COMPLETED
Start: 2025-07-08 | End: 2025-07-08

## 2025-07-08 RX ORDER — BUPIVACAINE HYDROCHLORIDE 5 MG/ML
INJECTION, SOLUTION EPIDURAL; INTRACAUDAL; PERINEURAL
Status: COMPLETED
Start: 2025-07-08 | End: 2025-07-08

## 2025-07-08 RX ORDER — DIPHENHYDRAMINE HYDROCHLORIDE 50 MG/ML
12.5 INJECTION, SOLUTION INTRAMUSCULAR; INTRAVENOUS
Status: DISCONTINUED | OUTPATIENT
Start: 2025-07-08 | End: 2025-07-08 | Stop reason: HOSPADM

## 2025-07-08 RX ORDER — CEFAZOLIN SODIUM 1 G/3ML
INJECTION, POWDER, FOR SOLUTION INTRAMUSCULAR; INTRAVENOUS PRN
Status: DISCONTINUED | OUTPATIENT
Start: 2025-07-08 | End: 2025-07-08 | Stop reason: SURG

## 2025-07-08 RX ORDER — ACETAMINOPHEN 325 MG/1
650 TABLET ORAL EVERY 4 HOURS PRN
Status: DISCONTINUED | OUTPATIENT
Start: 2025-07-08 | End: 2025-07-08 | Stop reason: HOSPADM

## 2025-07-08 RX ORDER — HYDROMORPHONE HYDROCHLORIDE 1 MG/ML
0.1 INJECTION, SOLUTION INTRAMUSCULAR; INTRAVENOUS; SUBCUTANEOUS
Status: DISCONTINUED | OUTPATIENT
Start: 2025-07-08 | End: 2025-07-08 | Stop reason: HOSPADM

## 2025-07-08 RX ORDER — SODIUM CHLORIDE, SODIUM LACTATE, POTASSIUM CHLORIDE, CALCIUM CHLORIDE 600; 310; 30; 20 MG/100ML; MG/100ML; MG/100ML; MG/100ML
INJECTION, SOLUTION INTRAVENOUS CONTINUOUS
Status: DISCONTINUED | OUTPATIENT
Start: 2025-07-08 | End: 2025-07-08 | Stop reason: HOSPADM

## 2025-07-08 RX ORDER — SODIUM CHLORIDE, SODIUM LACTATE, POTASSIUM CHLORIDE, CALCIUM CHLORIDE 600; 310; 30; 20 MG/100ML; MG/100ML; MG/100ML; MG/100ML
INJECTION, SOLUTION INTRAVENOUS
Status: DISCONTINUED | OUTPATIENT
Start: 2025-07-08 | End: 2025-07-08 | Stop reason: SURG

## 2025-07-08 RX ORDER — MEPERIDINE HYDROCHLORIDE 25 MG/ML
12.5 INJECTION INTRAMUSCULAR; INTRAVENOUS; SUBCUTANEOUS
Status: DISCONTINUED | OUTPATIENT
Start: 2025-07-08 | End: 2025-07-08 | Stop reason: HOSPADM

## 2025-07-08 RX ORDER — LIDOCAINE HYDROCHLORIDE 20 MG/ML
INJECTION, SOLUTION EPIDURAL; INFILTRATION; INTRACAUDAL; PERINEURAL PRN
Status: DISCONTINUED | OUTPATIENT
Start: 2025-07-08 | End: 2025-07-08 | Stop reason: SURG

## 2025-07-08 RX ORDER — MIDAZOLAM HYDROCHLORIDE 1 MG/ML
INJECTION INTRAMUSCULAR; INTRAVENOUS PRN
Status: DISCONTINUED | OUTPATIENT
Start: 2025-07-08 | End: 2025-07-08 | Stop reason: SURG

## 2025-07-08 RX ORDER — HYDROMORPHONE HYDROCHLORIDE 1 MG/ML
0.2 INJECTION, SOLUTION INTRAMUSCULAR; INTRAVENOUS; SUBCUTANEOUS
Status: DISCONTINUED | OUTPATIENT
Start: 2025-07-08 | End: 2025-07-08 | Stop reason: HOSPADM

## 2025-07-08 RX ORDER — LIDOCAINE HYDROCHLORIDE 40 MG/ML
SOLUTION TOPICAL
Status: DISCONTINUED
Start: 2025-07-08 | End: 2025-07-08 | Stop reason: HOSPADM

## 2025-07-08 RX ORDER — CALCIUM CHLORIDE 100 MG/ML
INJECTION INTRAVENOUS; INTRAVENTRICULAR
Status: COMPLETED
Start: 2025-07-08 | End: 2025-07-08

## 2025-07-08 RX ORDER — CALCIUM CHLORIDE 100 MG/ML
INJECTION INTRAVENOUS; INTRAVENTRICULAR PRN
Status: DISCONTINUED | OUTPATIENT
Start: 2025-07-08 | End: 2025-07-08 | Stop reason: SURG

## 2025-07-08 RX ORDER — PROTAMINE SULFATE 10 MG/ML
INJECTION, SOLUTION INTRAVENOUS
Status: COMPLETED
Start: 2025-07-08 | End: 2025-07-08

## 2025-07-08 RX ORDER — OXYCODONE HCL 5 MG/5 ML
5 SOLUTION, ORAL ORAL
Status: DISCONTINUED | OUTPATIENT
Start: 2025-07-08 | End: 2025-07-08 | Stop reason: HOSPADM

## 2025-07-08 RX ORDER — HYDROMORPHONE HYDROCHLORIDE 1 MG/ML
0.4 INJECTION, SOLUTION INTRAMUSCULAR; INTRAVENOUS; SUBCUTANEOUS
Status: DISCONTINUED | OUTPATIENT
Start: 2025-07-08 | End: 2025-07-08 | Stop reason: HOSPADM

## 2025-07-08 RX ORDER — LIDOCAINE HYDROCHLORIDE 40 MG/ML
SOLUTION TOPICAL PRN
Status: DISCONTINUED | OUTPATIENT
Start: 2025-07-08 | End: 2025-07-08 | Stop reason: SURG

## 2025-07-08 RX ORDER — ONDANSETRON 2 MG/ML
INJECTION INTRAMUSCULAR; INTRAVENOUS PRN
Status: DISCONTINUED | OUTPATIENT
Start: 2025-07-08 | End: 2025-07-08 | Stop reason: SURG

## 2025-07-08 RX ORDER — HEPARIN SODIUM 1000 [USP'U]/ML
INJECTION, SOLUTION INTRAVENOUS; SUBCUTANEOUS
Status: COMPLETED
Start: 2025-07-08 | End: 2025-07-08

## 2025-07-08 RX ORDER — EPHEDRINE SULFATE 50 MG/ML
INJECTION, SOLUTION INTRAVENOUS PRN
Status: DISCONTINUED | OUTPATIENT
Start: 2025-07-08 | End: 2025-07-08 | Stop reason: SURG

## 2025-07-08 RX ADMIN — FENTANYL CITRATE 50 MCG: 50 INJECTION, SOLUTION INTRAMUSCULAR; INTRAVENOUS at 09:14

## 2025-07-08 RX ADMIN — DEXAMETHASONE SODIUM PHOSPHATE 8 MG: 4 INJECTION INTRA-ARTICULAR; INTRALESIONAL; INTRAMUSCULAR; INTRAVENOUS; SOFT TISSUE at 08:12

## 2025-07-08 RX ADMIN — CALCIUM CHLORIDE 200 MG: 100 INJECTION, SOLUTION INTRAVENOUS; INTRAVENTRICULAR at 08:34

## 2025-07-08 RX ADMIN — EPHEDRINE SULFATE 10 MG: 50 INJECTION, SOLUTION INTRAVENOUS at 08:38

## 2025-07-08 RX ADMIN — FENTANYL CITRATE 50 MCG: 50 INJECTION, SOLUTION INTRAMUSCULAR; INTRAVENOUS at 10:02

## 2025-07-08 RX ADMIN — MIDAZOLAM HYDROCHLORIDE 2 MG: 1 INJECTION, SOLUTION INTRAMUSCULAR; INTRAVENOUS at 08:03

## 2025-07-08 RX ADMIN — BUPIVACAINE HYDROCHLORIDE: 5 INJECTION, SOLUTION EPIDURAL; INTRACAUDAL; PERINEURAL at 07:37

## 2025-07-08 RX ADMIN — PROPOFOL 150 MG: 10 INJECTION, EMULSION INTRAVENOUS at 08:12

## 2025-07-08 RX ADMIN — SODIUM CHLORIDE, POTASSIUM CHLORIDE, SODIUM LACTATE AND CALCIUM CHLORIDE: 600; 310; 30; 20 INJECTION, SOLUTION INTRAVENOUS at 08:03

## 2025-07-08 RX ADMIN — CALCIUM CHLORIDE 200 MG: 100 INJECTION, SOLUTION INTRAVENOUS; INTRAVENTRICULAR at 08:54

## 2025-07-08 RX ADMIN — LIDOCAINE HYDROCHLORIDE 4 ML: 40 SOLUTION TOPICAL at 08:13

## 2025-07-08 RX ADMIN — HYDRALAZINE HYDROCHLORIDE 5 MG: 20 INJECTION INTRAMUSCULAR; INTRAVENOUS at 10:43

## 2025-07-08 RX ADMIN — CEFAZOLIN 2 G: 1 INJECTION, POWDER, FOR SOLUTION INTRAMUSCULAR; INTRAVENOUS at 08:12

## 2025-07-08 RX ADMIN — CALCIUM CHLORIDE 200 MG: 100 INJECTION, SOLUTION INTRAVENOUS; INTRAVENTRICULAR at 08:29

## 2025-07-08 RX ADMIN — HEPARIN SODIUM 2000 UNITS: 200 INJECTION, SOLUTION INTRAVENOUS at 09:46

## 2025-07-08 RX ADMIN — ROCURONIUM BROMIDE 25 MG: 10 INJECTION, SOLUTION INTRAVENOUS at 08:59

## 2025-07-08 RX ADMIN — ROCURONIUM BROMIDE 50 MG: 10 INJECTION, SOLUTION INTRAVENOUS at 08:12

## 2025-07-08 RX ADMIN — SUGAMMADEX 200 MG: 100 INJECTION, SOLUTION INTRAVENOUS at 10:07

## 2025-07-08 RX ADMIN — PROTAMINE SULFATE 50 MG: 10 INJECTION, SOLUTION INTRAVENOUS at 10:05

## 2025-07-08 RX ADMIN — HEPARIN SODIUM: 1000 INJECTION, SOLUTION INTRAVENOUS; SUBCUTANEOUS at 09:04

## 2025-07-08 RX ADMIN — ONDANSETRON 8 MG: 2 INJECTION INTRAMUSCULAR; INTRAVENOUS at 10:02

## 2025-07-08 RX ADMIN — HEPARIN SODIUM 4000 UNITS: 200 INJECTION, SOLUTION INTRAVENOUS at 09:03

## 2025-07-08 RX ADMIN — LIDOCAINE HYDROCHLORIDE 100 MG: 20 INJECTION, SOLUTION EPIDURAL; INFILTRATION; INTRACAUDAL; PERINEURAL at 08:12

## 2025-07-08 RX ADMIN — LIDOCAINE HYDROCHLORIDE: 20 INJECTION, SOLUTION INFILTRATION; PERINEURAL at 07:37

## 2025-07-08 RX ADMIN — HYDRALAZINE HYDROCHLORIDE 5 MG: 20 INJECTION INTRAMUSCULAR; INTRAVENOUS at 13:04

## 2025-07-08 ASSESSMENT — FIBROSIS 4 INDEX: FIB4 SCORE: 2.47

## 2025-07-08 NOTE — OP REPORT
[facility]  Electrophysiology Procedure Note    Procedure(s) Performed:   1) Atrial fibrillation ablation  2) Ablation of additional atrial fibrillation mechanism  3) Ablation of second mechanism of arrhythmia (typical RA flutter)  4) Ablation of another mechanism of arrhythmia (CCW mitral re-entry)    Indication(s):  Persistent atrial fibrillation    Physician(s): Shyanne Grey M.D.     Resident/Assistant(s): None     Anesthesia: General endotracheal anesthetic.     Specimen(s) Removed: None     Estimated Blood Loss:  30cc     Complications:  None     Description of Procedure:   After informed written consent, the patient was brought to the EP lab in the fasting, non-sedated state. The patient was prepped and draped in the usual sterile fashion. Femoral venous access was obtained using the modified Seldinger technique. In the right femoral vein, 3 sheaths (8,8,7 Fr) were inserted over 0.035” guidewires. Perclose was pre-deployed on an 8 Fr access. A deflectable decapolar catheter was advanced to the CS position. Baseline rhythm fine atrial fibrillation. An intracardiac echo (ICE) catheter was advanced to the right atrium. ICE was used to identify the atrial septum, left atrial appendage, and pulmonary veins and georgie the anatomic structures to superimpose on our 3D electroanatomic map using CARTOSound. During the procedure, ICE was utilized to localize the ablation catheter, monitor for thrombus formation, and to exclude pericardial effusion. Intravenous heparin was administered to maintain -350 seconds. Transseptal left heart catheterization was performed under intracardiac echo and hemodynamic guidance using a Vettrocross system. Mapping of the pulmonary veins and left atrium was performed using CARTO3 FAM and a deflectable multipolar mapping catheter (Linksify). Device was reprogrammed to VVI 40 for the duration of the case. We exchanged for a Faradrive sheath. A Farawave catheter was advanced  to the LA. Ten applications per PV divided between basket and flower configurations were delivered to isolate the PVs. Additional applications in the flower configuration were delivered along the LA roof and floor to isolate the LA posterior wall as additional substrate of AF. At this point the fine atrial fibrillation had organized into an atrial flutter  msec with proximal to distal CS activation. Repeat voltage/activation map of the LA showed successful PV and PW isolation and early broad breakout at the low interatrial septum above the CS os suggestive of RA flutter. CCW RA activation was confirmed with RA map. We exchanged for a [employer] QDOT RFA catheter for ablation on the CTI at 40W. The flutter had changed to a longer CL at 450 msec at this point. Completion of the line failed to terminate entirely suggestive of different circuit with the now longer CL. Repeat RA map demonstrated early broad activation at the septum and LA map was repeated, now showing CCW mitral re-entry with slow conduction and breakout along existing anterior wall scar septal to the NOLAN os. RF ablation at 40W at the site of breakout septal to the existing scar terminated the flutter. This area was consolidated into an anterior mitral line down to the anterior mitral annulus. At the end of the procedure, heparin was reversed with protamine, the catheter and sheaths were removed, and hemostasis was achieved by Vascade MVP for smaller access and Perclose for the Faradrive access. Device was reprogrammed to original settings. Following recovery from anesthesia, the patient was transferred to the PPU in good condition.       Total ablation time: 60 applications of Farapulse    Fluoroscopy time: 13.4 minutes     Rhythm at EOS:    1. AV paced at DDD 60    Impressions:    1. Persistent atrial fibrillation organizing into RA flutter then mitral flutter    2. Successful PFA pulmonary vein isolation and posterior wall isolation procedure.     3. Successful RFA of CTI line and successful RFA of anterior mitral line     Recommendations:  1. Resume anticoagulation.  2. Transfer to monitored bedrest.

## 2025-07-08 NOTE — OR NURSING
1015 Patient arrived to unit from cath lab.  Report from anesthesia and RN.  Patient is sleeping.  Dressing to right groin is clean, dry and soft.  Patient updated on plan of care.  1043 Patient medicated per MAR for elevated blood pressure.  1050 Ooze noted to right groin, manual pressure held, sandbag placed.  1052 Wife called and updated on patient status.  1105 Cyndi APN to bedside.  1110 Wife to bedside, updated on plan of care.  1200 Head of bed elevated.  Belongings returned to patient.  1220 Oozing noted to groin, manual pressure held.  1230 Hemostasis achieved, dressing and sandbag placed.  1315 Cyndi APN at bedside.  Patient head of bed elevated.  1345 Patient ambulated, tolerated well.  Back to [facility], right groin is clean, dry and soft.  1400 Reviewed discharge paperwork with pt and wife. Discussed diet, activity, medications, follow up care and worsening symptoms.  Questions answered.  1405 Patient up to edge of bed, denies discomfort.  Access site clean, dry and soft. Patient getting dressed.    1420 All lines and monitors discontinued.  Pt discharged home with wife via wheelchair by CNA.

## 2025-07-08 NOTE — ANESTHESIA PROCEDURE NOTES
GOSIA    Date/Time: 7/8/2025 8:17 AM    Performed by: Charles Bustillo M.D.  Authorized by: Charles Bustillo M.D.    Start Time:7/8/2025 8:17 AM  Preanesthetic Checklist: patient identified, IV checked, risks and benefits discussed, surgical consent, monitors and equipment checked, pre-op evaluation and timeout performed    Indication for GOSIA: diagnostic   Patient Location: OR  Intubated: Yes  Bite Block: No  Heart Visualized: Yes  Insertion: atraumatic    **See FULL GOSIA report in patient's chart via CV Syn[employer]**

## 2025-07-08 NOTE — ANESTHESIA TIME REPORT
Anesthesia Start and Stop Event Times       Date Time Event    7/8/2025 0704 Ready for Procedure     0803 Anesthesia Start     1020 Anesthesia Stop          Responsible Staff  07/08/25      Name Role Begin End    Charles Bustillo M.D. Anesth 0803 1020          Overtime Reason:  no overtime (within assigned shift)    Comments:

## 2025-07-08 NOTE — H&P
"EP Pre-procedure H and P    Date of service: 7/8/2025    Reason for visit/Chief complaint: Persistent AF    HPI:   Pt is an 80 yo M. Persistent AF. Here for AF ablation.    Past Medical History[1]  Past Surgical History[2]    Physical Exam:  Vitals:    07/08/25 0600   BP: (!) 155/76   Pulse: 70   Resp: 16   Temp: 35.9 °C (96.6 °F)   TempSrc: Temporal   SpO2: 96%   Weight: 97.6 kg (215 lb 2.7 oz)   Height: 1.753 m (5' 9\")     Gen: NAD, conversant  HEENT: PERRL, EOMI  LUNGS: CTA B, no w/r/r  CV: RRR, no m/r/g, no JVD  Abd: Soft, NT/ND, +BS  Ext: no edema, warm and well perfused    Labs reviewed    EKG interpreted by me:  V paced    Impression/Recs:  1. Paroxysmal atrial fibrillation (HCC)  EC-GOSIA W/O CONT    EC-GOSIA W/O CONT    CL-EP ABLATION ATRIAL FIBRILLATION    CL-EP ABLATION ATRIAL FIBRILLATION      2. Persistent atrial fibrillation (HCC)  EC-GOSIA W/O CONT    EC-GOSIA W/O CONT      3. AV block, complete (HCC)  CL-EP ABLATION ATRIAL FIBRILLATION    CL-EP ABLATION ATRIAL FIBRILLATION        -Risks/benefits/alternatives discussed  -All questions answered  -Proceed with AF ablation    Shyanne Grey MD         [1]   Past Medical History:  Diagnosis Date    Arthritis     At risk for sleep apnea     AV block, complete (HCC) 07/2020    Status post pacemaker placement    Cataract     Chronic anticoagulation     Heart murmur 1957    Detected with murmur, then gone, now say slight murmur present    Hiatus hernia syndrome     History of chickenpox     History of measles     History of mumps     History of rheumatic fever     Hypertension     Pacemaker 07/2020    left chest wall , [employer]    Paroxysmal atrial fibrillation (HCC)     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 Grade II diastolic dysfunction. Normal RA and RV; moderately dilated LA. Mild MR, mild AS, mild TR. RVSP 39mmHg.    Rheumatic fever 1957    Snoring    [2]   Past Surgical History:  Procedure " Laterality Date    PB EXCIS TENDON SHEATH LESION, HAND/FINGER Left 07/08/2021    Procedure: EXCISION BIOPSY LEFT WRIST WITH CULTURES;  Surgeon: Emiliano Dawkins M.D.;  Location: [facility];  Service: Orthopedics    CT NEUROPLASTY & OR TRANSPOS MEDIAN NRV CARPAL ZULEMA Left 12/03/2020    Procedure: LEFT CARPAL TUNNEL RELEASE, LEFT GANGLION CYST EXCISION;  Surgeon: Emiliano Dawkins M.D.;  Location: [facility];  Service: Orthopedics    PACEMAKER INSERTION Left 07/17/2020    [employer] Accolade MRI [id number] implanted by Dr. Cantrell.    PB RECONSTR TOTAL SHOULDER IMPLANT Right 06/29/2020    Procedure: RT ARTHROPLASTY, SHOULDER, TOTAL;  Surgeon: Arsenio Hernandez M.D.;  Location: [facility];  Service: Orthopedics    OTHER      left eye surgery     OTHER      back surgery around 2023 (relieve sciatic nerve)    OTHER ORTHOPEDIC SURGERY      bilat RCR, shoulders, carpal tunnel surgery

## 2025-07-08 NOTE — ANESTHESIA PREPROCEDURE EVALUATION
Anesthesia Start Date/Time: 07/08/25 0803    Scheduled providers: Shyanne Grey M.D.; Charles Bustillo M.D.    Procedure: CL-EP ABLATION ATRIAL FIBRILLATION    Diagnosis:       AV block, complete (HCC) [I44.2]      Paroxysmal atrial fibrillation (HCC) [I48.0]    Indications: See Associated Dx    Location: [facility] Cath Lab - [facility]            Relevant Problems   CARDIAC   (positive) AV block, complete (HCC)   (positive) Cardiac pacemaker in situ   (positive) Hypertension   (positive) Paroxysmal atrial fibrillation (HCC)   (positive) Rheumatic aortic stenosis      Other   (positive) Chronic anticoagulation   (positive) Hyperlipidemia       Physical Exam    Airway   Mallampati: II  TM distance: >3 FB  Neck ROM: full       Cardiovascular - normal exam  Rhythm: regular  Rate: normal    (-) murmur     Dental - normal exam           Pulmonary - normal examBreath sounds clear to auscultation     Abdominal    Neurological - normal exam                   Anesthesia Plan    ASA 3   ASA physical status 3 criteria: implanted pacemaker and a thrombophilic disease requiring anticoagulation    Plan - general       Airway plan will be ETT  GOSIA Planned        Induction: intravenous    Postoperative Plan: Postoperative administration of opioids is intended.    Pertinent diagnostic labs and testing reviewed    Informed Consent:    Anesthetic plan and risks discussed with patient and spouse.    Use of blood products discussed with: patient and spouse whom consented to blood products.

## 2025-07-08 NOTE — ANESTHESIA PROCEDURE NOTES
Airway    Date/Time: 7/8/2025 8:13 AM    Performed by: Charles Bustillo M.D.  Authorized by: Charles Bustillo M.D.    Location:  OR  Urgency:  Elective  Difficult Airway: No    Indications for Airway Management:  Anesthesia      Spontaneous Ventilation: absent    Sedation Level:  Deep  Preoxygenated: Yes    Patient Position:  Sniffing  Final Airway Type:  Endotracheal airway  Final Endotracheal Airway:  ETT  Cuffed: Yes    Technique Used for Successful ETT Placement:  Direct laryngoscopy  Devices/Methods Used in Placement:  Intubating stylet and cricoid pressure    Insertion Site:  Oral  Blade Type:  Geovany  Laryngoscope Blade/Videolaryngoscope Blade Size:  3  ETT Size (mm):  7.5  Measured from:  Teeth  ETT to Teeth (cm):  23  Placement Verified by: auscultation and capnometry    Cormack-Lehane Classification:  Grade IIa - partial view of glottis  Number of Attempts at Approach:  1

## 2025-07-08 NOTE — ANESTHESIA POSTPROCEDURE EVALUATION
Patient: Jacobo Bowling    Procedure Summary       Date: 07/08/25 Room / Location: [facility] - Cath [facility]    Anesthesia Start: 0803 Anesthesia Stop: 1020    Procedure: CL-EP ABLATION ATRIAL FIBRILLATION Diagnosis:       AV block, complete (HCC)      Paroxysmal atrial fibrillation (HCC)      (See Associated Dx)    Scheduled Providers: Shyanne Grey M.D.; Charles Bustillo M.D. Responsible Provider: Charles Bustillo M.D.    Anesthesia Type: general ASA Status: 3            Final Anesthesia Type: general  Last vitals  BP   Blood Pressure : (!) 160/73    Temp   36.4 °C (97.5 °F)    Pulse   60   Resp   18    SpO2   98 %      Anesthesia Post Evaluation    Patient location during evaluation: PACU  Patient participation: complete - patient participated  Level of consciousness: awake and alert    Airway patency: patent  Anesthetic complications: no  Cardiovascular status: hemodynamically stable  Respiratory status: acceptable  Hydration status: euvolemic    PONV: none          No notable events documented.     Nurse Pain Score: 0 (NPRS)

## 2025-07-08 NOTE — DISCHARGE INSTRUCTIONS
What to Expect Post Anesthesia    Rest and take it easy for the first 24 hours.  A responsible adult is recommended to remain with you during that time.  It is normal to feel sleepy.  We encourage you to not do anything that requires balance, judgment or coordination.    FOR 24 HOURS DO NOT:  Drive, operate machinery or run household appliances.  Drink beer or alcoholic beverages.  Make important decisions or sign legal documents.    To avoid nausea, slowly advance diet as tolerated, avoiding spicy or greasy foods for the first day.  Add more substantial food to your diet according to your provider's instructions.  INCREASE FLUIDS AND FIBER TO AVOID CONSTIPATION.    MILD FLU-LIKE SYMPTOMS ARE NORMAL.  YOU MAY EXPERIENCE GENERALIZED MUSCLE ACHES, THROAT IRRITATION, HEADACHE AND/OR SOME NAUSEA.  If any questions arise, call your provider.  If your provider is not available, please feel free to call the Surgical Center at [phone].    MEDICATIONS: Resume taking daily medication.  Take prescribed pain medication with food.  If no medication is prescribed, you may take non-aspirin pain medication if needed.  PAIN MEDICATION CAN BE VERY CONSTIPATING.  Take a stool softener or laxative such as senokot, pericolace, or milk of magnesia if needed.    Diet    Resume your normal diet as tolerated.  A diet low in cholesterol, fat, and sodium is recommended for good health.     Discharge Instructions:  [facility] Post Ablation Patient Instructions:  No lifting > 10 lbs x 1 week.      No soaking in baths, hot tubs, pools x 1 week.  May shower the day after discharge and take off groin dressings and leave  sites uncovered.  Continue to monitor sites daily for warmth, redness, discolored drainage.  It is common to have a small lump in the area where the cather was (usually the size of a marble); this will go away but takes approximately 6 weeks to normalize.     3.   Please take all  medications as prescribed to you; please do not stop any medications prescribed post ablation unless directed by your healthcare provider.      4.   Please do not miss any doses of your blood thinner (if you have been started on, or take chronic blood thinners) without discussion with your healthcare provider first.     5.   Please walk and take deep breaths after discharge.  After discharge, if you experience neurological changes/signs of stroke or high fever you should be seen in the emergency dept.     6.   It is possible you may experience some chest discomfort or chest tightness post ablation.  This is usually secondary to inflammation and irritation of the tissues at the area of the ablation.  If this occurs, it is advised to try 400 mg of Ibuprofen with food as needed up to three times a day for a maximum of two days.  This should help to decrease pain and tissue inflammation.          **Please notify the office ([phone]) if this occurs.         ** DO NOT TAKE Ibuprofen IF HISTORY OF ALLERGY, SIGNIFICANT BLEEDING OR KIDNEY DISEASE WITHOUT DISCUSSING WITH YOUR CARDIOLOGY PROVIDER FIRST.          ** If pain becomes severe or you have additional symptoms you may need to be medically evaluated; please contact the cardiology office ([phone]) for further direction.     7. It is possible that you may experience arrhythmia/Atrial Fibrillation post ablation.  This is secondary to irritation and inflammation of the cardiac tissues from the ablation.  If you have atrial fibrillation all day or feel poorly with it, please notify your cardiologist's via phone ([phone]) or NetLext.      8.  Please contact call our office ([phone]) or message via [employer] message if you have any questions or concerns post procedurally.    9. You need to be seen for post ablation follow up 3-4 weeks post procedure. An appointment is scheduled for you.  Please contact the office ([phone]) if you need to change your  appointment.    10.  If there is bleeding at the catheter insertion site, apply pressure for 10 minutes.  If bleeding persists, call 911, and continue to hold pressure until advanced medical support arrives.    DRESSING: Keep dressing and incision dry and intact for 24 hours, may remove dressing and shower after 2 PM on 7/9, do not need to replace.  Do not submerge site in water for 7 days.    BOWEL FUNCTION:  If you are having problems, use what you normally would or call your provider for suggestions. It also helps to stay regular by including fiber in your diet (for example: bran or fruits and vegetables) and drink plenty of liquids (water, juice, etc.).

## 2025-07-11 ENCOUNTER — TELEPHONE (OUTPATIENT)
Dept: CARDIOLOGY | Facility: MEDICAL CENTER | Age: 80
End: 2025-07-11
Payer: MEDICARE

## 2025-07-11 NOTE — TELEPHONE ENCOUNTER
AB        Caller: Jacobo Bowling      Topic/issue: Patient was calling because he's been experiencing inflammation following his ablation and he was advised that he could take 400 mg of ibuprofen and he was concerned because of the Eliquis he's currently taking and he was asking for a call back. Please advise          Callback Number: [phone]      Thank you    -Raymond WOO

## 2025-07-11 NOTE — TELEPHONE ENCOUNTER
AB  Caller: Jacobo Bowling     Topic/issue: Patient returning phone call.    Callback Number: [phone]      Thank you    Janis WARE

## 2025-07-11 NOTE — TELEPHONE ENCOUNTER
Note tele encounter  ============================  Phone Number Called: [phone]     Call outcome: Did not leave a detailed message. Requested patient to call back.    Message: Called to discuss pt questions

## 2025-07-14 NOTE — TELEPHONE ENCOUNTER
Noted AB response/recommendations below.  ========================  Phone Number Called:  [phone]     Call outcome: Did not leave a detailed message. Requested patient to call back.    Yingying Licaihart message sent as well.    Message: Called to update pt on

## 2025-07-14 NOTE — TELEPHONE ENCOUNTER
"Noted tele encounter below.   ==============================  Phone Number Called: [phone]       Call outcome: Spoke to patient regarding message below.  And wife Amy  Message: Called to discuss pt questions re:ablation/BP     Pt called because   1) he doesn't know what the outcome was of the ablation. Requesting information      His BP while in for procedure went up, but was managed with medicine and was in the 130's/70's when he was discharged.    2.) Now BP is 150\"s-180's SBP with upper 70's for DBP. HR is 60-61 (pacemaker)    3.) Pt has noted a increase in SOB since ablation, he also is fatigued (not more than per procedure)    Pt states only 1 BM since procedure, but started miralax  =============================  To AB please review and advise with any recommendations re: outcome ablation, increased SOB and increased BP. Thank you        "

## 2025-07-14 NOTE — TELEPHONE ENCOUNTER
Looks like patient had FU with MT on 7/29/2025 to discuss all of these questions/issues.  I would continue to monitor his BP at home.   He can always increase Lisinopril from 2.5mg to 5mg if it stays >140/90.  Thanks, AB

## 2025-07-14 NOTE — TELEPHONE ENCOUNTER
AB      Caller: Jacobo Bowling        Topic/issue: Patient was returning our call and was asking for a call back regarding post ablation concerns with his elevated blood pressure and possible medication to control it. Please advise        Callback Number: [phone]    Thank you    -Raymond WOO

## 2025-07-19 ENCOUNTER — NON-PROVIDER VISIT (OUTPATIENT)
Dept: CARDIOLOGY | Facility: MEDICAL CENTER | Age: 80
End: 2025-07-19
Payer: MEDICARE

## 2025-07-19 PROCEDURE — 93294 REM INTERROG EVL PM/LDLS PM: CPT | Performed by: INTERNAL MEDICINE

## 2025-07-21 ENCOUNTER — RESULTS FOLLOW-UP (OUTPATIENT)
Facility: MEDICAL CENTER | Age: 80
End: 2025-07-21
Payer: MEDICARE

## 2025-07-21 LAB — LV EJECT FRACT  99904: 60

## 2025-07-22 ENCOUNTER — PATIENT MESSAGE (OUTPATIENT)
Dept: CARDIOLOGY | Facility: MEDICAL CENTER | Age: 80
End: 2025-07-22
Payer: MEDICARE

## 2025-07-22 DIAGNOSIS — I10 ESSENTIAL HYPERTENSION: Primary | ICD-10-CM

## 2025-07-22 RX ORDER — LISINOPRIL 10 MG/1
10 TABLET ORAL DAILY
Qty: 90 TABLET | Refills: 3 | Status: SHIPPED | OUTPATIENT
Start: 2025-07-22 | End: 2026-08-26

## 2025-07-22 NOTE — PATIENT COMMUNICATION
Noted pt MyChart response.    Average BP and /77 61.    MyChart message to pt.  ===========================  To AB FYI

## 2025-07-22 NOTE — PROGRESS NOTES
So, average BP of 147/77 is still too high for us.  Would increased to 10mg once daily and monitor BP.  BMP in 7-10 days to check renal function and K+.  Thanks!  AB

## 2025-07-22 NOTE — PATIENT COMMUNICATION
Noted AB response/recommendations, below.  DANNY River   7/22/25 12:23 PM  Note      So, average BP of 147/77 is still too high for us.  Would increased to 10mg once daily and monitor BP.  BMP in 7-10 days to check renal function and K+.  Thanks!  AB      =============================================================  BMP lab ordered    Prescription for update lisinopril sent.  ================================  MyChart message to pt.

## 2025-07-24 ENCOUNTER — PATIENT MESSAGE (OUTPATIENT)
Dept: CARDIOLOGY | Facility: MEDICAL CENTER | Age: 80
End: 2025-07-24
Payer: MEDICARE

## 2025-07-28 ENCOUNTER — PATIENT MESSAGE (OUTPATIENT)
Dept: CARDIOLOGY | Facility: MEDICAL CENTER | Age: 80
End: 2025-07-28
Payer: MEDICARE

## 2025-07-29 ENCOUNTER — OFFICE VISIT (OUTPATIENT)
Dept: CARDIOLOGY | Facility: MEDICAL CENTER | Age: 80
End: 2025-07-29
Attending: NURSE PRACTITIONER
Payer: MEDICARE

## 2025-07-29 VITALS
HEART RATE: 60 BPM | OXYGEN SATURATION: 96 % | WEIGHT: 207 LBS | SYSTOLIC BLOOD PRESSURE: 120 MMHG | DIASTOLIC BLOOD PRESSURE: 62 MMHG | HEIGHT: 69 IN | BODY MASS INDEX: 30.66 KG/M2

## 2025-07-29 DIAGNOSIS — Z95.0 CARDIAC PACEMAKER IN SITU: ICD-10-CM

## 2025-07-29 DIAGNOSIS — I48.0 PAROXYSMAL ATRIAL FIBRILLATION (HCC): Primary | ICD-10-CM

## 2025-07-29 LAB — EKG IMPRESSION: NORMAL

## 2025-07-29 PROCEDURE — 93005 ELECTROCARDIOGRAM TRACING: CPT | Mod: TC | Performed by: NURSE PRACTITIONER

## 2025-07-29 PROCEDURE — 99212 OFFICE O/P EST SF 10 MIN: CPT | Performed by: NURSE PRACTITIONER

## 2025-07-29 PROCEDURE — 93280 PM DEVICE PROGR EVAL DUAL: CPT | Performed by: NURSE PRACTITIONER

## 2025-07-29 ASSESSMENT — FIBROSIS 4 INDEX: FIB4 SCORE: 3.03

## 2025-07-29 NOTE — PROGRESS NOTES
Atrial Fibrillation (AF) Follow up Note    DOS: 7/29/2025  [id number]  Jacobo Bowling    Chief complaint/Reason for consult: post ablation    HPI: Pt is a 79 y.o. male who presents to the clinic today in follow up for ablation. Patient has a past medical history significant for but not limited to: pacemaker, hypertension, AF S/P ablation. Patient underwent ablation approximately 3 weeks ago with targeted sites of isolation: pulmonary veins and posterior wall via pulse field catheter and cavo-tricuspid isthmus and anterior mitral line via radiofrequency catheter. Throat and groin healed well. Procedure, recovery expectations and lifestyle modifiers discussed in detail. Blood pressure good. No recurrent arrhythmias on device check. Device working appropriately.         Past Medical History[1]    Past Surgical History[2]    Social History     Socioeconomic History    Marital status:      Spouse name: Not on file    Number of children: Not on file    Years of education: Not on file    Highest education level: Not on file   Occupational History    Not on file   Tobacco Use    Smoking status: Former     Current packs/day: 0.30     Average packs/day: 0.3 packs/day for 32.6 years (9.8 ttl pk-yrs)     Types: Cigarettes     Start date: 1993    Smokeless tobacco: Never    Tobacco comments:     4 daily, pt states he does not inhale; very limited   Vaping Use    Vaping status: Never Used   Substance and Sexual Activity    Alcohol use: Yes     Alcohol/week: 1.8 oz     Types: 1 Cans of beer, 2 Shots of liquor per week     Comment: 2-3 per week    Drug use: No    Sexual activity: Not on file   Other Topics Concern    Not on file   Social History Narrative    Not on file     Social Drivers of Health     Financial Resource Strain: Not on file   Food Insecurity: Not on file   Transportation Needs: Not on file   Physical Activity: Not on file   Stress: Not on file   Social Connections: Not on file   Intimate Partner  Violence: Not on file   Housing Stability: Not on file       Family History   Problem Relation Age of Onset    Cancer Mother         Breast,  at 62    Stroke Father         CVA age 67       Allergies[3]    Current Medications[4]    Vitals:    25 0953   BP: 120/62   Pulse: 60   SpO2: 96%         Review of Systems   Constitutional:  Positive for malaise/fatigue.            Device Type: [employer] Dual Chamber Pacemaker  AP%:  73  %: >99  Battery Longevity: 2yrs  Lead Impedance: stable and within normal limits  Indication: HB  Events: none    Device interrogated and programmed by Mohsen Barrios         EKG interpreted by me: AV paced    Physical Exam  Constitutional:       Appearance: Normal appearance.   HENT:      Head: Normocephalic.   Eyes:      Pupils: Pupils are equal, round, and reactive to light.   Neck:      Vascular: No JVD.   Cardiovascular:      Rate and Rhythm: Normal rate and regular rhythm.      Pulses: Normal pulses.      Heart sounds: Normal heart sounds.   Pulmonary:      Effort: Pulmonary effort is normal.      Breath sounds: Normal breath sounds.   Abdominal:      General: Abdomen is flat.      Palpations: Abdomen is soft.   Musculoskeletal:      Cervical back: Normal range of motion.      Right lower leg: No edema.      Left lower leg: No edema.   Skin:     General: Skin is warm and dry.   Neurological:      Mental Status: He is alert and oriented to person, place, and time.   Psychiatric:         Mood and Affect: Mood normal.         Behavior: Behavior normal.          Data:  Lipids:   Lab Results   Component Value Date/Time    CHOLSTRLTOT 177 2025 07:38 AM    TRIGLYCERIDE 94 2025 07:38 AM    HDL 53.0 2025 07:38 AM     (H) 2025 07:38 AM        BMP:  Lab Results   Component Value Date/Time    SODIUM 137 2025 0620    POTASSIUM 4.4 2025 0620    CHLORIDE 105 2025 0620    CO2 21 2025 0620    GLUCOSE 95 2025 0620    BUN  "24 (H) 07/08/2025 0620    CREATININE 1.49 (H) 07/08/2025 0620    CALCIUM 9.3 07/08/2025 0620    ANION 11.0 07/08/2025 0620       GFR:  Lab Results   Component Value Date/Time    IFAFRICA >60 10/07/2021 1445    IFNOTAFR 53 (A) 10/07/2021 1445        TSH:   Lab Results   Component Value Date/Time    TSHULTRASEN 3.61 03/07/2025 0738       MAGNESIUM:  No results found for: \"MAGNESIUM\"     THYROXINE (T4):   No results found for: \"FREEDIR\"     CBC:   Lab Results   Component Value Date/Time    WBC 7.6 07/08/2025 06:20 AM    RBC 3.57 (L) 07/08/2025 06:20 AM    HEMOGLOBIN 13.4 (L) 07/08/2025 06:20 AM    HEMATOCRIT 39.9 (L) 07/08/2025 06:20 AM    .8 (H) 07/08/2025 06:20 AM    MCH 37.5 (H) 07/08/2025 06:20 AM    MCHC 33.6 07/08/2025 06:20 AM    RDW 60.6 (H) 07/08/2025 06:20 AM    PLATELETCT 126 (L) 07/08/2025 06:20 AM    MPV 12.3 07/08/2025 06:20 AM    NEUTSPOLYS 59.00 07/08/2025 06:20 AM    LYMPHOCYTES 28.20 07/08/2025 06:20 AM    MONOCYTES 10.30 07/08/2025 06:20 AM    EOSINOPHILS 1.70 07/08/2025 06:20 AM    BASOPHILS 0.80 07/08/2025 06:20 AM    IMMGRAN 0 08/15/2019 04:18 AM    NRBC 0.00 07/08/2025 06:20 AM    NEUTS 4.48 07/08/2025 06:20 AM    NEUTS 4.0 08/15/2019 04:18 AM    LYMPHS 2.14 07/08/2025 06:20 AM    LYMPHS 2.9 08/15/2019 04:18 AM    MONOS 0.80 07/08/2025 06:20 AM    MONOS 0.8 08/15/2019 04:18 AM    EOS 0.13 07/08/2025 06:20 AM    EOS 0.2 08/15/2019 04:18 AM    BASO 0.06 07/08/2025 06:20 AM    BASO 0.0 08/15/2019 04:18 AM    IMMGRANAB 0.0 08/15/2019 04:18 AM    NRBCAB 0.00 07/08/2025 06:20 AM        CBC w/o DIFF  Lab Results   Component Value Date/Time    WBC 7.6 07/08/2025 06:20 AM    RBC 3.57 (L) 07/08/2025 06:20 AM    HEMOGLOBIN 13.4 (L) 07/08/2025 06:20 AM    .8 (H) 07/08/2025 06:20 AM    MCH 37.5 (H) 07/08/2025 06:20 AM    MCHC 33.6 07/08/2025 06:20 AM    RDW 60.6 (H) 07/08/2025 06:20 AM    MPV 12.3 07/08/2025 06:20 AM       LIVER:  Lab Results   Component Value Date/Time    ALKPHOSPHAT 47 " "07/08/2025 06:20 AM    ASTSGOT 20 07/08/2025 06:20 AM    ALTSGPT 18 07/08/2025 06:20 AM    TBILIRUBIN 0.7 07/08/2025 06:20 AM       BNP:  No results found for: \"BNPBTYPENAT\"    PT/INR:  Lab Results   Component Value Date/Time    PROTHROMBTM 16.0 (H) 07/08/2025 06:20 AM    PROTHROMBTM 13.7 07/17/2020 07:35 AM    INR 1.27 (H) 07/08/2025 06:20 AM    INR 1.02 07/17/2020 07:35 AM             Impression/Plan:  Paroxysmal atrial fibrillation (HCC)   - PFA of PVI and PWI   - RFA of anterior mitral and CTI   - doing well   - no arrhythmias since on device interrogation   - continue Eliquis for stroke    - blood pressure stable   - needs to get active        Cardiac pacemaker in situ   - leads intact   - working appropriately   - 2 yrs on battery        Lifestyle Modification for better heart health care as well as beneficial for prevention of worsening the atrial arrhythmia progression. Lifestyle modification discussed includes diet and reduction of sodium. Patients should eat more of a Mediterranean diet and be very careful with how much processed and fast food they eat. Excessive sodium intake can result in fluid retention which can add additional strain to patients cardiac electrical system. Weight loss can also help long term with cardiac health. For patients with BMI above 25kg/m2, studies have shown a greater chance of progression of disease as well as recurrence after interventions. ARREST-AF study showed less recurrence of AF and slower disease progression in patients with BMI below 27kg/m2. Smoking and vaping should be stopped. Moderation on caffeine and alcohol. Excessive alcohol or caffeine can result in reactions and antagonize the hearts electrical system. Patient that fit the matrix for sleep apnea should be referred for a formal study and should try to be compliant with treatment for sleep apnea. Stay hydrated and stay active. Studies have shown that staying physically active can help heart health. The " CARDIO-FIT study showed sedentary individuals lead to faster time of recurrence of arrhythmia. Exercise goals should be trying to maintain 120-200 minutes per week of exercise.      A total of 30 minutes of time was spent on day of encounter reviewing medical record, performing history and examination, counseling, ordering medication/test/consults, collaborating with referring service, and documentation.    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 Grade II diastolic dysfunction. Normal RA and RV; moderately dilated LA. Mild MR, mild AS, mild TR. RVSP 39mmHg.    Rheumatic fever 1957    Snoring    [2]   Past Surgical History:  Procedure Laterality Date    PB EXCIS TENDON SHEATH LESION, HAND/FINGER Left 07/08/2021    Procedure: EXCISION BIOPSY LEFT WRIST WITH CULTURES;  Surgeon: Emiliano Dawkins M.D.;  Location: [facility];  Service: Orthopedics    HI NEUROPLASTY & OR TRANSPOS MEDIAN NRV CARPAL ZULEMA Left 12/03/2020    Procedure: LEFT CARPAL TUNNEL RELEASE, LEFT GANGLION CYST EXCISION;  Surgeon: Emiliano Dawkins M.D.;  Location: [facility];  Service: Orthopedics    PACEMAKER INSERTION Left 07/17/2020    [employer] Accolade MRI L311 implanted by Dr. Cantrell.    PB RECONSTR TOTAL SHOULDER IMPLANT Right 06/29/2020    Procedure: RT ARTHROPLASTY, SHOULDER,  TOTAL;  Surgeon: Arsenio Hernandez M.D.;  Location: SUR[facility];  Service: Orthopedics    OTHER      left eye surgery     OTHER      back surgery around 2023 (relieve sciatic nerve)    OTHER ORTHOPEDIC SURGERY      bilat RCR, shoulders, carpal tunnel surgery   [3]   Allergies  Allergen Reactions    Atorvastatin Diarrhea     Sever diarrhea, does not want to try again    Typhoid Vaccines    [4]   Current Outpatient Medications   Medication Sig Dispense Refill    lisinopril (PRINIVIL) 10 MG Tab Take 1 Tablet by mouth every day. (Patient taking differently: Take 5 mg by mouth every day.) 90 Tablet 3    diphenhydrAMINE-APAP, sleep, (TYLENOL PM EXTRA STRENGTH PO) Take 1 Tablet by mouth every evening. Cosme brand      apixaban (ELIQUIS) 5mg Tab Take 1 Tablet by mouth 2 times a day. 200 Tablet 3    hydrocortisone rectal (PERIANAL) 2.5% Cream Apply small amount to anus daily 30 g 5    multivitamin (THERAGRAN) Tab Take 1 Tablet by mouth every day. Mens multivitamin      amoxicillin-clavulanate (AUGMENTIN) 875-125 MG Tab Take 1 Tablet by mouth 1 time a day as needed (prn dental). Pre-dental procedures (Patient not taking: Reported on 7/29/2025)       No current facility-administered medications for this visit.

## 2025-08-05 ENCOUNTER — RESULTS FOLLOW-UP (OUTPATIENT)
Facility: MEDICAL CENTER | Age: 80
End: 2025-08-05
Payer: MEDICARE

## 2025-08-06 LAB — EKG IMPRESSION: NORMAL
